# Patient Record
Sex: FEMALE | Race: OTHER | Employment: FULL TIME | URBAN - METROPOLITAN AREA
[De-identification: names, ages, dates, MRNs, and addresses within clinical notes are randomized per-mention and may not be internally consistent; named-entity substitution may affect disease eponyms.]

---

## 2020-03-23 ENCOUNTER — HOSPITAL ENCOUNTER (EMERGENCY)
Facility: HOSPITAL | Age: 30
Discharge: HOME | End: 2020-03-23
Payer: COMMERCIAL

## 2020-03-23 ENCOUNTER — APPOINTMENT (OUTPATIENT)
Dept: CT IMAGING | Facility: HOSPITAL | Age: 30
End: 2020-03-23
Attending: NURSE PRACTITIONER
Payer: COMMERCIAL

## 2020-03-23 VITALS
HEART RATE: 81 BPM | OXYGEN SATURATION: 100 % | DIASTOLIC BLOOD PRESSURE: 77 MMHG | RESPIRATION RATE: 16 BRPM | SYSTOLIC BLOOD PRESSURE: 136 MMHG | TEMPERATURE: 98 F

## 2020-03-23 DIAGNOSIS — N91.2 AMENORRHEA: ICD-10-CM

## 2020-03-23 DIAGNOSIS — K42.9 UMBILICAL HERNIA WITHOUT OBSTRUCTION AND WITHOUT GANGRENE: Primary | ICD-10-CM

## 2020-03-23 LAB
ALBUMIN SERPL-MCNC: 3.8 G/DL (ref 3.4–5)
ALP LIVER SERPL-CCNC: 114 U/L (ref 37–98)
ALT SERPL-CCNC: 58 U/L (ref 13–56)
ANION GAP SERPL CALC-SCNC: 3 MMOL/L (ref 0–18)
AST SERPL-CCNC: 30 U/L (ref 15–37)
B-HCG UR QL: NEGATIVE
BASOPHILS # BLD AUTO: 0.05 X10(3) UL (ref 0–0.2)
BASOPHILS NFR BLD AUTO: 0.8 %
BILIRUB DIRECT SERPL-MCNC: <0.1 MG/DL (ref 0–0.2)
BILIRUB SERPL-MCNC: 0.3 MG/DL (ref 0.1–2)
BILIRUB UR QL: NEGATIVE
BUN BLD-MCNC: 12 MG/DL (ref 7–18)
BUN/CREAT SERPL: 18.8 (ref 10–20)
CALCIUM BLD-MCNC: 8.8 MG/DL (ref 8.5–10.1)
CHLORIDE SERPL-SCNC: 105 MMOL/L (ref 98–112)
CLARITY UR: CLEAR
CO2 SERPL-SCNC: 31 MMOL/L (ref 21–32)
COLOR UR: YELLOW
CREAT BLD-MCNC: 0.64 MG/DL (ref 0.55–1.02)
DEPRECATED RDW RBC AUTO: 38.5 FL (ref 35.1–46.3)
EOSINOPHIL # BLD AUTO: 0.11 X10(3) UL (ref 0–0.7)
EOSINOPHIL NFR BLD AUTO: 1.7 %
ERYTHROCYTE [DISTWIDTH] IN BLOOD BY AUTOMATED COUNT: 12.3 % (ref 11–15)
GLUCOSE BLD-MCNC: 113 MG/DL (ref 70–99)
GLUCOSE UR-MCNC: NEGATIVE MG/DL
HCT VFR BLD AUTO: 42 % (ref 35–48)
HGB BLD-MCNC: 14.8 G/DL (ref 12–16)
IMM GRANULOCYTES # BLD AUTO: 0.08 X10(3) UL (ref 0–1)
IMM GRANULOCYTES NFR BLD: 1.3 %
KETONES UR-MCNC: NEGATIVE MG/DL
LYMPHOCYTES # BLD AUTO: 1.76 X10(3) UL (ref 1–4)
LYMPHOCYTES NFR BLD AUTO: 27.8 %
M PROTEIN MFR SERPL ELPH: 7.9 G/DL (ref 6.4–8.2)
MCH RBC QN AUTO: 30.5 PG (ref 26–34)
MCHC RBC AUTO-ENTMCNC: 35.2 G/DL (ref 31–37)
MCV RBC AUTO: 86.4 FL (ref 80–100)
MONOCYTES # BLD AUTO: 0.47 X10(3) UL (ref 0.1–1)
MONOCYTES NFR BLD AUTO: 7.4 %
NEUTROPHILS # BLD AUTO: 3.86 X10 (3) UL (ref 1.5–7.7)
NEUTROPHILS # BLD AUTO: 3.86 X10(3) UL (ref 1.5–7.7)
NEUTROPHILS NFR BLD AUTO: 61 %
NITRITE UR QL STRIP.AUTO: NEGATIVE
OSMOLALITY SERPL CALC.SUM OF ELEC: 289 MOSM/KG (ref 275–295)
PH UR: 7 [PH] (ref 5–8)
PLATELET # BLD AUTO: 195 10(3)UL (ref 150–450)
POTASSIUM SERPL-SCNC: 3.6 MMOL/L (ref 3.5–5.1)
PROT UR-MCNC: NEGATIVE MG/DL
RBC # BLD AUTO: 4.86 X10(6)UL (ref 3.8–5.3)
RBC #/AREA URNS AUTO: 19 /HPF
SODIUM SERPL-SCNC: 139 MMOL/L (ref 136–145)
SP GR UR STRIP: 1.02 (ref 1–1.03)
UROBILINOGEN UR STRIP-ACNC: <2
WBC # BLD AUTO: 6.3 X10(3) UL (ref 4–11)
WBC #/AREA URNS AUTO: 6 /HPF

## 2020-03-23 PROCEDURE — 36415 COLL VENOUS BLD VENIPUNCTURE: CPT

## 2020-03-23 PROCEDURE — 74177 CT ABD & PELVIS W/CONTRAST: CPT | Performed by: NURSE PRACTITIONER

## 2020-03-23 PROCEDURE — 81025 URINE PREGNANCY TEST: CPT

## 2020-03-23 PROCEDURE — 87086 URINE CULTURE/COLONY COUNT: CPT | Performed by: NURSE PRACTITIONER

## 2020-03-23 PROCEDURE — 85025 COMPLETE CBC W/AUTO DIFF WBC: CPT | Performed by: NURSE PRACTITIONER

## 2020-03-23 PROCEDURE — 81001 URINALYSIS AUTO W/SCOPE: CPT | Performed by: NURSE PRACTITIONER

## 2020-03-23 PROCEDURE — 80048 BASIC METABOLIC PNL TOTAL CA: CPT | Performed by: NURSE PRACTITIONER

## 2020-03-23 PROCEDURE — 99284 EMERGENCY DEPT VISIT MOD MDM: CPT

## 2020-03-23 PROCEDURE — 80076 HEPATIC FUNCTION PANEL: CPT | Performed by: NURSE PRACTITIONER

## 2020-03-23 NOTE — ED INITIAL ASSESSMENT (HPI)
Pt c/o mid lower abdominal pain described as \"a full, bloated feeling\", x2 days. Pt states the pain is worse after eating, last BM was an hour ago, states normal BM. Pt c/o urinary frequency, with \"a pink/white residue on toilet paper when I wipe. \" Pt

## 2020-03-23 NOTE — ED NOTES
Received pt from triage. Pt here with c/o mid and LLQ abd pain for the past 2 days. Abd tender on palpation. Denies any N/V/D.  Iv inserted, labs drawn and sent

## 2020-03-23 NOTE — ED PROVIDER NOTES
Patient Seen in: [facility] AND [facility] Emergency Department      History   Patient presents with:  Abdomen/Flank Pain    Stated Complaint: abdominal pain     HPI    61-year-old female, with no past medical history, presents to the emergency department compla Cardiovascular:      Rate and Rhythm: Normal rate. Heart sounds: No murmur. Pulmonary:      Effort: Pulmonary effort is normal.      Breath sounds: Normal breath sounds. Abdominal:      Palpations: Abdomen is soft. Tenderness:  There is tend other acute process  MDM     Exam benign umbilical pain . labs within normal limits. Patient is hemodynamically stable and was instructed to follow-up with pmd /GI Dr Jose Hogan. Strict return precautions given.     Imaging:         CONCLUSION:   1.   There

## 2020-03-24 ENCOUNTER — TELEPHONE (OUTPATIENT)
Dept: GASTROENTEROLOGY | Facility: CLINIC | Age: 30
End: 2020-03-24

## 2020-03-24 NOTE — TELEPHONE ENCOUNTER
I reviewed ER evaluation from yesterday which indicates complaints of mid abdominal pain x2 days associated with abdominal bloating. No vomiting, diarrhea, acute bleeding.   Lab work demonstrates elevated glucose and mildly elevated alk phos (114), ALT (58

## 2020-03-24 NOTE — TELEPHONE ENCOUNTER
Using [employer] #[id number], patient contacted. We have never seen in clinic. Forwarding to [facility] APN to review ER note/recommendation GI and CT. Accepted appt Mon 4/6, instructed to check in by 1:15pm and given detailed directions to [facility].  Is a

## 2020-03-24 NOTE — H&P
[address] [facility] [address] Gastroenterology                                                                                                  Clinic History and Physical     Pa apnea. Pertinent Family Hx:  - No family hx of esophageal, gastric or colon cancer  - No family history of IBD.     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 >45 minutes was spent with >50% in patient consultation/coordination of care    2. Elevated LFTs: Mild elevation in liver function tests which may be transient. No acute hepatobiliary findings on CT scan.   Plan to repeat hepatic function test

## 2020-03-24 NOTE — TELEPHONE ENCOUNTER
Pt. requesting to be seen for ER F/up for Umbilical hernia without obstruction and without gangrene. Pt states that she is having a lot of pain. - [place] Spkg.

## 2020-03-24 NOTE — TELEPHONE ENCOUNTER
Using [employer], #[id number], he left a voicemail that I added patient to earlier appt on Thursday, April 2, arrival 10:15am at the same location as the previous 4/6 appt from earlier this morning.  Requested call back as soon as possible to co

## 2020-04-02 ENCOUNTER — OFFICE VISIT (OUTPATIENT)
Dept: GASTROENTEROLOGY | Facility: CLINIC | Age: 30
End: 2020-04-02
Payer: COMMERCIAL

## 2020-04-02 VITALS
WEIGHT: 181 LBS | BODY MASS INDEX: 35.53 KG/M2 | HEIGHT: 60 IN | HEART RATE: 76 BPM | DIASTOLIC BLOOD PRESSURE: 66 MMHG | SYSTOLIC BLOOD PRESSURE: 116 MMHG

## 2020-04-02 DIAGNOSIS — R14.0 ABDOMINAL BLOATING: ICD-10-CM

## 2020-04-02 DIAGNOSIS — K59.00 CONSTIPATION, UNSPECIFIED CONSTIPATION TYPE: ICD-10-CM

## 2020-04-02 DIAGNOSIS — R10.33 PERIUMBILICAL PAIN: Primary | ICD-10-CM

## 2020-04-02 DIAGNOSIS — R79.89 ELEVATED LFTS: ICD-10-CM

## 2020-04-02 DIAGNOSIS — R10.13 DYSPEPSIA: ICD-10-CM

## 2020-04-02 PROCEDURE — 99204 OFFICE O/P NEW MOD 45 MIN: CPT | Performed by: NURSE PRACTITIONER

## 2020-04-03 ENCOUNTER — APPOINTMENT (OUTPATIENT)
Dept: LAB | Facility: HOSPITAL | Age: 30
End: 2020-04-03
Attending: NURSE PRACTITIONER
Payer: COMMERCIAL

## 2020-04-03 DIAGNOSIS — R10.13 DYSPEPSIA: ICD-10-CM

## 2020-04-03 DIAGNOSIS — R10.33 PERIUMBILICAL PAIN: ICD-10-CM

## 2020-04-03 DIAGNOSIS — R14.0 ABDOMINAL BLOATING: ICD-10-CM

## 2020-04-03 PROCEDURE — 87338 HPYLORI STOOL AG IA: CPT

## 2020-04-09 ENCOUNTER — TELEPHONE (OUTPATIENT)
Dept: GASTROENTEROLOGY | Facility: CLINIC | Age: 30
End: 2020-04-09

## 2020-04-09 DIAGNOSIS — A04.8 H. PYLORI INFECTION: Primary | ICD-10-CM

## 2020-04-09 RX ORDER — METRONIDAZOLE 500 MG/1
500 TABLET ORAL 3 TIMES DAILY
Qty: 42 TABLET | Refills: 0 | Status: SHIPPED | OUTPATIENT
Start: 2020-04-09 | End: 2020-04-23

## 2020-04-09 RX ORDER — PANTOPRAZOLE SODIUM 40 MG/1
40 TABLET, DELAYED RELEASE ORAL 2 TIMES DAILY
Qty: 28 TABLET | Refills: 0 | Status: SHIPPED | OUTPATIENT
Start: 2020-04-09 | End: 2020-04-23

## 2020-04-09 RX ORDER — CLARITHROMYCIN 500 MG/1
500 TABLET, COATED ORAL 2 TIMES DAILY
Qty: 28 TABLET | Refills: 0 | Status: SHIPPED | OUTPATIENT
Start: 2020-04-09 | End: 2020-04-23

## 2020-04-09 NOTE — TELEPHONE ENCOUNTER
----- Message from PARVIZ Ocampo sent at 4/9/2020 10:38 AM CDT -----  Nursing: Patient is Pashto-speaking. Please let her know that the H. pylori test came back positive for infection in the stomach.   I would recommend treatment which will include

## 2020-04-09 NOTE — TELEPHONE ENCOUNTER
Pt was contacted and discussed APRN message below as well as the following w/ patient regarding h.  Pylori treatment:     LAUREN #[id number] USED TO REVIEW BELOW:      Patient accepted follow up appointment for July 14, 2020 at 8:00am with [facility] [place] [facility] while on therapy. Clarithromycin- hold any STATIN/tamsulosin/escitalopram for duration of treatment as it is contraindicated- NEED AN MD ORDER IN ORDER TO INFORM THE PATIENT TO HOLD! Entered recall into Epic: Per Guevara HALL Repeat h. p

## 2020-04-10 NOTE — TELEPHONE ENCOUNTER
#[id number]- Pt returned call CSS relayed RN message pertaining to PA needed for metronidazole as well as to wait to take the other two medications until she has received the metronidazole.  Please call pt once PA has been approved [phone]

## 2020-04-10 NOTE — TELEPHONE ENCOUNTER
Patient had called because Christel only gave her 2 of the 3 medications ordered for her H Pylori treatment. Patient aware not to start taking any of her treatment until she has all three medications.       I called Christel [facility]) and metronidazole i

## 2020-04-10 NOTE — TELEPHONE ENCOUNTER
PA submitted in cover my meds for Metronidazole: Your information has been submitted to [employer]. [employer] is reviewing the PA request and you will receive an electronic response.  You may check for the updated outcome later by reopening this requ

## 2020-04-28 ENCOUNTER — TELEPHONE (OUTPATIENT)
Dept: GASTROENTEROLOGY | Facility: CLINIC | Age: 30
End: 2020-04-28

## 2020-04-28 NOTE — TELEPHONE ENCOUNTER
Patient calling to check status on prior auth regarding medications - also see TE 4/9/2020. Please call. Thank you.

## 2020-04-28 NOTE — TELEPHONE ENCOUNTER
Forwarding this back to GI RNs, appears Blane Eason working on this. I closed 4/28 encounter--patient calling to check status of Metronidazole authorization. I contacted Zenaida Oreilly at [address] below [phone]--DIPTI was unable to find patient.   I a

## 2020-06-01 RX ORDER — PANTOPRAZOLE SODIUM 40 MG/1
40 TABLET, DELAYED RELEASE ORAL 2 TIMES DAILY
Qty: 28 TABLET | Refills: 0 | Status: SHIPPED | OUTPATIENT
Start: 2020-06-01 | End: 2020-06-15

## 2020-06-01 NOTE — TELEPHONE ENCOUNTER
Nursing: I have resent pantoprazole as requested. Just to confirm, the patient is taking all 3 of these medications together correct?

## 2020-06-01 NOTE — TELEPHONE ENCOUNTER
Kelsey Rosales    Patient told me that prior auth was needed for metronidazole. No prior auth needed per plan (because already done?). I spoke with Christel and patient never picked up pantoprazole. I filled out request for PA in [email or web address].   Per record,

## 2020-06-01 NOTE — TELEPHONE ENCOUNTER
Patient aware to take all 3 medications together, she has been waiting to get all 3 to be able to start treatment. Required PA for metronidazole and now working on PA for the pantoprazole she never received.

## 2020-06-01 NOTE — TELEPHONE ENCOUNTER
Unable to follow up on request through [employer] because it says incorrect name and birthday (even though verified that it matched insurance card). I spoke with Cleopatra Monteiro at [facility] and per Cleopatra Monteiro, this medication does not require preauthorization.

## 2020-06-02 NOTE — TELEPHONE ENCOUNTER
Catherine Erazo (Key: [id number])Need help? Call us at [phone]  Status  Sent to [address] June 1  Next Steps  The plan will fax you a determination, typically within 1 to 5 business days.     Still awaiting PA response

## 2020-06-09 NOTE — TELEPHONE ENCOUNTER
I spoke with Sachin Jiménez with [employer]. She could not find the patient in the system. I then spoke with Maritza Arango who told me that [employer] is in charge of her medication coverage instead of [employer].   Per covermymeds it gets submitted to [employer]

## 2020-06-11 NOTE — TELEPHONE ENCOUNTER
I spoke with Genaro Wagoner at [facility] and prior authorization approved for Pantoprazole 28 tablets. I spoke with Christel, the medication has prior authorization and will be $15 out of pocket.     I tried to call patient to notify her that we received prior aut

## 2020-06-12 NOTE — TELEPHONE ENCOUNTER
Orin Hguhes #[id number]. Patient aware that prior authorization was received for Pantoprazole.   Per Christel, that was the only medication that the patient did not  out of the three medications that were ordered for her H Pylori Treatment (metr

## 2020-07-31 ENCOUNTER — TELEPHONE (OUTPATIENT)
Dept: GASTROENTEROLOGY | Facility: CLINIC | Age: 30
End: 2020-07-31

## 2020-07-31 NOTE — TELEPHONE ENCOUNTER
Patient was waiting for a prior authorization and did not start treatment until June 12, 2020. Will place recall for H pylori stool test for 8 weeks post treatment which will be 8/21/2020.     Thank you

## 2020-07-31 NOTE — TELEPHONE ENCOUNTER
Received patient outreach message below. Patient due for repeat H Pylori,    \"Entered recall into Epic: Per Chaim HALL Repeat h.pylori stool/breath test due approximately:July 9 2020. \"

## 2020-08-12 ENCOUNTER — TELEPHONE (OUTPATIENT)
Dept: GASTROENTEROLOGY | Facility: CLINIC | Age: 30
End: 2020-08-12

## 2020-08-12 NOTE — TELEPHONE ENCOUNTER
Patient outreach message received below. Patient now due for repeat H Pylori testing.       \"Note      Patient was waiting for a prior authorization and did not start treatment until June 12, 2020.     Will place recall for H pylori stool test for 8 weeks

## 2020-08-17 NOTE — TELEPHONE ENCOUNTER
I spoke with Lev Sanchez using [facility]  [facility] [id number]. Reminding her to complete her H pylori stool testing and to stop her acid reducing medication for 2 weeks prior to testing. She has completed her course of pantoprazole and antibiotics.  She has

## 2020-12-24 ENCOUNTER — TELEPHONE (OUTPATIENT)
Dept: GASTROENTEROLOGY | Facility: CLINIC | Age: 30
End: 2020-12-24

## 2021-05-23 ENCOUNTER — HOSPITAL ENCOUNTER (EMERGENCY)
Facility: HOSPITAL | Age: 31
Discharge: HOME | End: 2021-05-23
Attending: EMERGENCY MEDICINE
Payer: COMMERCIAL

## 2021-05-23 ENCOUNTER — APPOINTMENT (OUTPATIENT)
Dept: ULTRASOUND IMAGING | Facility: HOSPITAL | Age: 31
End: 2021-05-23
Attending: EMERGENCY MEDICINE
Payer: COMMERCIAL

## 2021-05-23 VITALS
TEMPERATURE: 99 F | DIASTOLIC BLOOD PRESSURE: 64 MMHG | HEART RATE: 72 BPM | OXYGEN SATURATION: 99 % | BODY MASS INDEX: 28.32 KG/M2 | SYSTOLIC BLOOD PRESSURE: 112 MMHG | HEIGHT: 64.96 IN | RESPIRATION RATE: 18 BRPM | WEIGHT: 170 LBS

## 2021-05-23 DIAGNOSIS — O20.9 VAGINAL BLEEDING AFFECTING EARLY PREGNANCY: Primary | ICD-10-CM

## 2021-05-23 PROCEDURE — 81001 URINALYSIS AUTO W/SCOPE: CPT | Performed by: EMERGENCY MEDICINE

## 2021-05-23 PROCEDURE — 86900 BLOOD TYPING SEROLOGIC ABO: CPT | Performed by: EMERGENCY MEDICINE

## 2021-05-23 PROCEDURE — 99284 EMERGENCY DEPT VISIT MOD MDM: CPT

## 2021-05-23 PROCEDURE — 76801 OB US < 14 WKS SINGLE FETUS: CPT | Performed by: EMERGENCY MEDICINE

## 2021-05-23 PROCEDURE — 81025 URINE PREGNANCY TEST: CPT

## 2021-05-23 PROCEDURE — 86901 BLOOD TYPING SEROLOGIC RH(D): CPT | Performed by: EMERGENCY MEDICINE

## 2021-05-23 PROCEDURE — 36415 COLL VENOUS BLD VENIPUNCTURE: CPT

## 2021-05-23 PROCEDURE — 76817 TRANSVAGINAL US OBSTETRIC: CPT | Performed by: EMERGENCY MEDICINE

## 2021-05-23 PROCEDURE — 84702 CHORIONIC GONADOTROPIN TEST: CPT | Performed by: EMERGENCY MEDICINE

## 2021-05-23 PROCEDURE — 80048 BASIC METABOLIC PNL TOTAL CA: CPT | Performed by: EMERGENCY MEDICINE

## 2021-05-23 PROCEDURE — 85025 COMPLETE CBC W/AUTO DIFF WBC: CPT | Performed by: EMERGENCY MEDICINE

## 2021-05-23 NOTE — ED INITIAL ASSESSMENT (HPI)
Radha Neal is here for evaluation of vaginal bleeding. She had blood work done on Friday at [facility] and was found to be pregnant.

## 2021-05-25 ENCOUNTER — OFFICE VISIT (OUTPATIENT)
Dept: OBGYN CLINIC | Facility: CLINIC | Age: 31
End: 2021-05-25
Payer: COMMERCIAL

## 2021-05-25 VITALS — DIASTOLIC BLOOD PRESSURE: 72 MMHG | SYSTOLIC BLOOD PRESSURE: 102 MMHG

## 2021-05-25 DIAGNOSIS — N92.6 MISSED MENSES: Primary | ICD-10-CM

## 2021-05-25 DIAGNOSIS — O20.0 THREATENED ABORTION, ANTEPARTUM: ICD-10-CM

## 2021-05-25 DIAGNOSIS — O36.80X0 PREGNANCY, LOCATION UNKNOWN: ICD-10-CM

## 2021-05-25 PROCEDURE — 3078F DIAST BP <80 MM HG: CPT | Performed by: OBSTETRICS & GYNECOLOGY

## 2021-05-25 PROCEDURE — 99204 OFFICE O/P NEW MOD 45 MIN: CPT | Performed by: OBSTETRICS & GYNECOLOGY

## 2021-05-25 PROCEDURE — 3074F SYST BP LT 130 MM HG: CPT | Performed by: OBSTETRICS & GYNECOLOGY

## 2021-05-25 PROCEDURE — 81025 URINE PREGNANCY TEST: CPT | Performed by: OBSTETRICS & GYNECOLOGY

## 2021-05-25 NOTE — PROGRESS NOTES
HPI/Subjective:   Patient ID: Claudia Jackson is a 35year old female. HPI  New patient  67-year-old  6 para 5-0-0-5 last menstrual period  at 8-0/7 weeks gestational age.   States that she had been spotting for close to 2 weeks and on ANGIOEDEMA  Penicillins             SHORTNESS OF BREATH    Objective:   Physical Exam  Alert and oriented  In no distress. No masses.   Abdomen soft and nontender obese  Assessment & Plan:   Missed menses  (primary encounter diagnosis)  Threatened

## 2021-05-26 ENCOUNTER — TELEPHONE (OUTPATIENT)
Dept: OBGYN CLINIC | Facility: CLINIC | Age: 31
End: 2021-05-26

## 2021-05-26 ENCOUNTER — LAB ENCOUNTER (OUTPATIENT)
Dept: LAB | Facility: HOSPITAL | Age: 31
End: 2021-05-26
Attending: OBSTETRICS & GYNECOLOGY
Payer: COMMERCIAL

## 2021-05-26 DIAGNOSIS — O20.0 THREATENED ABORTION, ANTEPARTUM: ICD-10-CM

## 2021-05-26 DIAGNOSIS — N92.6 MISSED MENSES: ICD-10-CM

## 2021-05-26 DIAGNOSIS — O36.80X0 PREGNANCY, LOCATION UNKNOWN: ICD-10-CM

## 2021-05-26 PROCEDURE — 36415 COLL VENOUS BLD VENIPUNCTURE: CPT

## 2021-05-26 PROCEDURE — 84702 CHORIONIC GONADOTROPIN TEST: CPT

## 2021-05-26 NOTE — TELEPHONE ENCOUNTER
I informed patient of quant bhcg abnormal, plateau indicating an abnormal pregnancy- miscarriage vs ectopic. Recommend continued q 3 day quant bhcg.   Please move up patient's ultrasound appt with me for Tues 6/1 at 1 pm.

## 2021-05-28 NOTE — TELEPHONE ENCOUNTER
Patient did not understand message that was recently left for her because she is a Syriac speaking patient. Please call again using the .

## 2021-05-28 NOTE — TELEPHONE ENCOUNTER
Patient name and  verified. Patient states she received a call from [address] [place] [address]. Informed patient no one from office contacted her and it may have been the automated system conforming her  appt. No further questions.

## 2021-05-29 ENCOUNTER — LAB ENCOUNTER (OUTPATIENT)
Dept: LAB | Facility: HOSPITAL | Age: 31
End: 2021-05-29
Attending: OBSTETRICS & GYNECOLOGY
Payer: COMMERCIAL

## 2021-05-29 DIAGNOSIS — O20.0 THREATENED ABORTION, ANTEPARTUM: ICD-10-CM

## 2021-05-29 DIAGNOSIS — O36.80X0 PREGNANCY, LOCATION UNKNOWN: ICD-10-CM

## 2021-05-29 DIAGNOSIS — N92.6 MISSED MENSES: ICD-10-CM

## 2021-05-29 PROCEDURE — 36415 COLL VENOUS BLD VENIPUNCTURE: CPT

## 2021-05-29 PROCEDURE — 84702 CHORIONIC GONADOTROPIN TEST: CPT

## 2021-06-01 ENCOUNTER — ANESTHESIA (OUTPATIENT)
Dept: SURGERY | Facility: HOSPITAL | Age: 31
End: 2021-06-01
Payer: COMMERCIAL

## 2021-06-01 ENCOUNTER — HOSPITAL ENCOUNTER (OUTPATIENT)
Facility: HOSPITAL | Age: 31
Setting detail: OBSERVATION
Discharge: HOME | End: 2021-06-02
Attending: EMERGENCY MEDICINE | Admitting: OBSTETRICS & GYNECOLOGY
Payer: COMMERCIAL

## 2021-06-01 ENCOUNTER — OFFICE VISIT (OUTPATIENT)
Dept: OBGYN CLINIC | Facility: CLINIC | Age: 31
End: 2021-06-01
Payer: COMMERCIAL

## 2021-06-01 ENCOUNTER — APPOINTMENT (OUTPATIENT)
Dept: ULTRASOUND IMAGING | Facility: HOSPITAL | Age: 31
End: 2021-06-01
Attending: EMERGENCY MEDICINE
Payer: COMMERCIAL

## 2021-06-01 ENCOUNTER — ANESTHESIA EVENT (OUTPATIENT)
Dept: SURGERY | Facility: HOSPITAL | Age: 31
End: 2021-06-01
Payer: COMMERCIAL

## 2021-06-01 VITALS — SYSTOLIC BLOOD PRESSURE: 111 MMHG | DIASTOLIC BLOOD PRESSURE: 65 MMHG

## 2021-06-01 DIAGNOSIS — O36.80X0 PREGNANCY, LOCATION UNKNOWN: ICD-10-CM

## 2021-06-01 DIAGNOSIS — O20.0 THREATENED ABORTION, ANTEPARTUM: Primary | ICD-10-CM

## 2021-06-01 DIAGNOSIS — O00.201 ECTOPIC PREGNANCY OF RIGHT OVARY: Primary | ICD-10-CM

## 2021-06-01 PROCEDURE — 76817 TRANSVAGINAL US OBSTETRIC: CPT | Performed by: OBSTETRICS & GYNECOLOGY

## 2021-06-01 PROCEDURE — 3078F DIAST BP <80 MM HG: CPT | Performed by: OBSTETRICS & GYNECOLOGY

## 2021-06-01 PROCEDURE — 99213 OFFICE O/P EST LOW 20 MIN: CPT | Performed by: OBSTETRICS & GYNECOLOGY

## 2021-06-01 PROCEDURE — 76801 OB US < 14 WKS SINGLE FETUS: CPT | Performed by: EMERGENCY MEDICINE

## 2021-06-01 PROCEDURE — 0UB04ZX EXCISION OF RIGHT OVARY, PERCUTANEOUS ENDOSCOPIC APPROACH, DIAGNOSTIC: ICD-10-PCS | Performed by: OBSTETRICS & GYNECOLOGY

## 2021-06-01 PROCEDURE — 0UT54ZZ RESECTION OF RIGHT FALLOPIAN TUBE, PERCUTANEOUS ENDOSCOPIC APPROACH: ICD-10-PCS | Performed by: OBSTETRICS & GYNECOLOGY

## 2021-06-01 PROCEDURE — 59151 TREAT ECTOPIC PREGNANCY: CPT | Performed by: OBSTETRICS & GYNECOLOGY

## 2021-06-01 PROCEDURE — 76817 TRANSVAGINAL US OBSTETRIC: CPT | Performed by: EMERGENCY MEDICINE

## 2021-06-01 PROCEDURE — 3074F SYST BP LT 130 MM HG: CPT | Performed by: OBSTETRICS & GYNECOLOGY

## 2021-06-01 PROCEDURE — 10T24ZZ RESECTION OF PRODUCTS OF CONCEPTION, ECTOPIC, PERCUTANEOUS ENDOSCOPIC APPROACH: ICD-10-PCS | Performed by: OBSTETRICS & GYNECOLOGY

## 2021-06-01 RX ORDER — HEPARIN SODIUM 1000 [USP'U]/ML
INJECTION, SOLUTION INTRAVENOUS; SUBCUTANEOUS AS NEEDED
Status: DISCONTINUED | OUTPATIENT
Start: 2021-06-01 | End: 2021-06-01 | Stop reason: HOSPADM

## 2021-06-01 RX ORDER — HYDROCODONE BITARTRATE AND ACETAMINOPHEN 5; 325 MG/1; MG/1
1 TABLET ORAL AS NEEDED
Status: DISCONTINUED | OUTPATIENT
Start: 2021-06-01 | End: 2021-06-01 | Stop reason: HOSPADM

## 2021-06-01 RX ORDER — SODIUM CHLORIDE 0.9 % (FLUSH) 0.9 %
10 SYRINGE (ML) INJECTION AS NEEDED
Status: DISCONTINUED | OUTPATIENT
Start: 2021-06-01 | End: 2021-06-02

## 2021-06-01 RX ORDER — DEXTROSE, SODIUM CHLORIDE, SODIUM LACTATE, POTASSIUM CHLORIDE, AND CALCIUM CHLORIDE 5; .6; .31; .03; .02 G/100ML; G/100ML; G/100ML; G/100ML; G/100ML
INJECTION, SOLUTION INTRAVENOUS CONTINUOUS
Status: DISCONTINUED | OUTPATIENT
Start: 2021-06-01 | End: 2021-06-02

## 2021-06-01 RX ORDER — PROCHLORPERAZINE EDISYLATE 5 MG/ML
5 INJECTION INTRAMUSCULAR; INTRAVENOUS ONCE AS NEEDED
Status: DISCONTINUED | OUTPATIENT
Start: 2021-06-01 | End: 2021-06-01 | Stop reason: HOSPADM

## 2021-06-01 RX ORDER — ACETAMINOPHEN 325 MG/1
650 TABLET ORAL EVERY 4 HOURS PRN
Status: DISCONTINUED | OUTPATIENT
Start: 2021-06-01 | End: 2021-06-02

## 2021-06-01 RX ORDER — HALOPERIDOL 5 MG/ML
0.25 INJECTION INTRAMUSCULAR ONCE AS NEEDED
Status: DISCONTINUED | OUTPATIENT
Start: 2021-06-01 | End: 2021-06-01 | Stop reason: HOSPADM

## 2021-06-01 RX ORDER — HYDROCODONE BITARTRATE AND ACETAMINOPHEN 5; 325 MG/1; MG/1
1 TABLET ORAL EVERY 4 HOURS PRN
Status: DISCONTINUED | OUTPATIENT
Start: 2021-06-01 | End: 2021-06-02

## 2021-06-01 RX ORDER — BUPIVACAINE HYDROCHLORIDE 2.5 MG/ML
INJECTION, SOLUTION EPIDURAL; INFILTRATION; INTRACAUDAL AS NEEDED
Status: DISCONTINUED | OUTPATIENT
Start: 2021-06-01 | End: 2021-06-01 | Stop reason: HOSPADM

## 2021-06-01 RX ORDER — ROCURONIUM BROMIDE 10 MG/ML
INJECTION, SOLUTION INTRAVENOUS AS NEEDED
Status: DISCONTINUED | OUTPATIENT
Start: 2021-06-01 | End: 2021-06-01 | Stop reason: SURG

## 2021-06-01 RX ORDER — MIDAZOLAM HYDROCHLORIDE 1 MG/ML
INJECTION INTRAMUSCULAR; INTRAVENOUS AS NEEDED
Status: DISCONTINUED | OUTPATIENT
Start: 2021-06-01 | End: 2021-06-01 | Stop reason: SURG

## 2021-06-01 RX ORDER — HYDROMORPHONE HYDROCHLORIDE 1 MG/ML
0.8 INJECTION, SOLUTION INTRAMUSCULAR; INTRAVENOUS; SUBCUTANEOUS EVERY 2 HOUR PRN
Status: DISCONTINUED | OUTPATIENT
Start: 2021-06-01 | End: 2021-06-02

## 2021-06-01 RX ORDER — NALOXONE HYDROCHLORIDE 0.4 MG/ML
80 INJECTION, SOLUTION INTRAMUSCULAR; INTRAVENOUS; SUBCUTANEOUS AS NEEDED
Status: DISCONTINUED | OUTPATIENT
Start: 2021-06-01 | End: 2021-06-01 | Stop reason: HOSPADM

## 2021-06-01 RX ORDER — GLYCOPYRROLATE 0.2 MG/ML
INJECTION, SOLUTION INTRAMUSCULAR; INTRAVENOUS AS NEEDED
Status: DISCONTINUED | OUTPATIENT
Start: 2021-06-01 | End: 2021-06-01 | Stop reason: SURG

## 2021-06-01 RX ORDER — ONDANSETRON 2 MG/ML
4 INJECTION INTRAMUSCULAR; INTRAVENOUS EVERY 8 HOURS PRN
Status: DISCONTINUED | OUTPATIENT
Start: 2021-06-01 | End: 2021-06-02

## 2021-06-01 RX ORDER — DIPHENHYDRAMINE HYDROCHLORIDE 50 MG/ML
12.5 INJECTION INTRAMUSCULAR; INTRAVENOUS EVERY 4 HOURS PRN
Status: DISCONTINUED | OUTPATIENT
Start: 2021-06-01 | End: 2021-06-02

## 2021-06-01 RX ORDER — HYDROMORPHONE HYDROCHLORIDE 1 MG/ML
0.2 INJECTION, SOLUTION INTRAMUSCULAR; INTRAVENOUS; SUBCUTANEOUS EVERY 2 HOUR PRN
Status: DISCONTINUED | OUTPATIENT
Start: 2021-06-01 | End: 2021-06-02

## 2021-06-01 RX ORDER — CLINDAMYCIN PHOSPHATE 150 MG/ML
INJECTION, SOLUTION, CONCENTRATE INTRAVENOUS AS NEEDED
Status: DISCONTINUED | OUTPATIENT
Start: 2021-06-01 | End: 2021-06-01 | Stop reason: SURG

## 2021-06-01 RX ORDER — SODIUM CHLORIDE, SODIUM LACTATE, POTASSIUM CHLORIDE, CALCIUM CHLORIDE 600; 310; 30; 20 MG/100ML; MG/100ML; MG/100ML; MG/100ML
INJECTION, SOLUTION INTRAVENOUS CONTINUOUS
Status: DISCONTINUED | OUTPATIENT
Start: 2021-06-01 | End: 2021-06-01 | Stop reason: HOSPADM

## 2021-06-01 RX ORDER — ONDANSETRON 2 MG/ML
INJECTION INTRAMUSCULAR; INTRAVENOUS AS NEEDED
Status: DISCONTINUED | OUTPATIENT
Start: 2021-06-01 | End: 2021-06-01 | Stop reason: SURG

## 2021-06-01 RX ORDER — HYDROMORPHONE HYDROCHLORIDE 1 MG/ML
0.4 INJECTION, SOLUTION INTRAMUSCULAR; INTRAVENOUS; SUBCUTANEOUS EVERY 5 MIN PRN
Status: DISCONTINUED | OUTPATIENT
Start: 2021-06-01 | End: 2021-06-01 | Stop reason: HOSPADM

## 2021-06-01 RX ORDER — ONDANSETRON 2 MG/ML
4 INJECTION INTRAMUSCULAR; INTRAVENOUS ONCE AS NEEDED
Status: DISCONTINUED | OUTPATIENT
Start: 2021-06-01 | End: 2021-06-01 | Stop reason: HOSPADM

## 2021-06-01 RX ORDER — HYDROMORPHONE HYDROCHLORIDE 1 MG/ML
0.2 INJECTION, SOLUTION INTRAMUSCULAR; INTRAVENOUS; SUBCUTANEOUS EVERY 5 MIN PRN
Status: DISCONTINUED | OUTPATIENT
Start: 2021-06-01 | End: 2021-06-01 | Stop reason: HOSPADM

## 2021-06-01 RX ORDER — MORPHINE SULFATE 4 MG/ML
4 INJECTION, SOLUTION INTRAMUSCULAR; INTRAVENOUS EVERY 10 MIN PRN
Status: DISCONTINUED | OUTPATIENT
Start: 2021-06-01 | End: 2021-06-01 | Stop reason: HOSPADM

## 2021-06-01 RX ORDER — ONDANSETRON 4 MG/1
4 TABLET, FILM COATED ORAL EVERY 8 HOURS PRN
Status: DISCONTINUED | OUTPATIENT
Start: 2021-06-01 | End: 2021-06-02

## 2021-06-01 RX ORDER — SODIUM CHLORIDE 9 MG/ML
INJECTION, SOLUTION INTRAVENOUS CONTINUOUS
Status: DISCONTINUED | OUTPATIENT
Start: 2021-06-01 | End: 2021-06-01

## 2021-06-01 RX ORDER — HYDROMORPHONE HYDROCHLORIDE 1 MG/ML
0.6 INJECTION, SOLUTION INTRAMUSCULAR; INTRAVENOUS; SUBCUTANEOUS EVERY 5 MIN PRN
Status: DISCONTINUED | OUTPATIENT
Start: 2021-06-01 | End: 2021-06-01 | Stop reason: HOSPADM

## 2021-06-01 RX ORDER — NEOSTIGMINE METHYLSULFATE 1 MG/ML
INJECTION INTRAVENOUS AS NEEDED
Status: DISCONTINUED | OUTPATIENT
Start: 2021-06-01 | End: 2021-06-01 | Stop reason: SURG

## 2021-06-01 RX ORDER — MORPHINE SULFATE 4 MG/ML
2 INJECTION, SOLUTION INTRAMUSCULAR; INTRAVENOUS EVERY 10 MIN PRN
Status: DISCONTINUED | OUTPATIENT
Start: 2021-06-01 | End: 2021-06-01 | Stop reason: HOSPADM

## 2021-06-01 RX ORDER — SODIUM CHLORIDE 9 MG/ML
INJECTION, SOLUTION INTRAVENOUS CONTINUOUS
Status: DISCONTINUED | OUTPATIENT
Start: 2021-06-01 | End: 2021-06-02

## 2021-06-01 RX ORDER — HYDROCODONE BITARTRATE AND ACETAMINOPHEN 5; 325 MG/1; MG/1
2 TABLET ORAL AS NEEDED
Status: DISCONTINUED | OUTPATIENT
Start: 2021-06-01 | End: 2021-06-01 | Stop reason: HOSPADM

## 2021-06-01 RX ORDER — HYDROMORPHONE HYDROCHLORIDE 1 MG/ML
0.4 INJECTION, SOLUTION INTRAMUSCULAR; INTRAVENOUS; SUBCUTANEOUS EVERY 2 HOUR PRN
Status: DISCONTINUED | OUTPATIENT
Start: 2021-06-01 | End: 2021-06-02

## 2021-06-01 RX ORDER — DEXAMETHASONE SODIUM PHOSPHATE 4 MG/ML
VIAL (ML) INJECTION AS NEEDED
Status: DISCONTINUED | OUTPATIENT
Start: 2021-06-01 | End: 2021-06-01 | Stop reason: SURG

## 2021-06-01 RX ORDER — MORPHINE SULFATE 10 MG/ML
6 INJECTION, SOLUTION INTRAMUSCULAR; INTRAVENOUS EVERY 10 MIN PRN
Status: DISCONTINUED | OUTPATIENT
Start: 2021-06-01 | End: 2021-06-01 | Stop reason: HOSPADM

## 2021-06-01 RX ORDER — HYDROCODONE BITARTRATE AND ACETAMINOPHEN 5; 325 MG/1; MG/1
2 TABLET ORAL EVERY 4 HOURS PRN
Status: DISCONTINUED | OUTPATIENT
Start: 2021-06-01 | End: 2021-06-02

## 2021-06-01 RX ORDER — SODIUM CHLORIDE, SODIUM LACTATE, POTASSIUM CHLORIDE, CALCIUM CHLORIDE 600; 310; 30; 20 MG/100ML; MG/100ML; MG/100ML; MG/100ML
INJECTION, SOLUTION INTRAVENOUS CONTINUOUS PRN
Status: DISCONTINUED | OUTPATIENT
Start: 2021-06-01 | End: 2021-06-01

## 2021-06-01 RX ADMIN — GLYCOPYRROLATE 0.6 MG: 0.2 INJECTION, SOLUTION INTRAMUSCULAR; INTRAVENOUS at 20:30:00

## 2021-06-01 RX ADMIN — CLINDAMYCIN PHOSPHATE 900 MG: 150 INJECTION, SOLUTION, CONCENTRATE INTRAVENOUS at 19:30:00

## 2021-06-01 RX ADMIN — GLYCOPYRROLATE 0.2 MG: 0.2 INJECTION, SOLUTION INTRAMUSCULAR; INTRAVENOUS at 19:18:00

## 2021-06-01 RX ADMIN — SODIUM CHLORIDE: 9 INJECTION, SOLUTION INTRAVENOUS at 19:17:00

## 2021-06-01 RX ADMIN — DEXAMETHASONE SODIUM PHOSPHATE 4 MG: 4 MG/ML VIAL (ML) INJECTION at 19:18:00

## 2021-06-01 RX ADMIN — MIDAZOLAM HYDROCHLORIDE 2 MG: 1 INJECTION INTRAMUSCULAR; INTRAVENOUS at 19:18:00

## 2021-06-01 RX ADMIN — NEOSTIGMINE METHYLSULFATE 4 MG: 1 INJECTION INTRAVENOUS at 20:30:00

## 2021-06-01 RX ADMIN — ROCURONIUM BROMIDE 40 MG: 10 INJECTION, SOLUTION INTRAVENOUS at 19:18:00

## 2021-06-01 RX ADMIN — ONDANSETRON 4 MG: 2 INJECTION INTRAMUSCULAR; INTRAVENOUS at 19:18:00

## 2021-06-01 NOTE — ED INITIAL ASSESSMENT (HPI)
Patient sent by dr Rosalina Ha for evaluation of ectopic pregnancy, patient complains of abd pain with vaginal discharge, states today the discharge was pink

## 2021-06-01 NOTE — PROGRESS NOTES
HPI/Subjective:   Patient ID: Gasper Lowe is a 35year old female. HPI  GYN follow-up  Patient noting some increasing lower abdominal girth. She has noted intermittent pain across her lower abdomen worse with ambulation.   She denies dizziness o

## 2021-06-01 NOTE — ED QUICK NOTES
Patient with pain 6/10 to right lower abd at this time. Remains non-toxic appearing. On continuous monitoring; IV fluids infusing with vss. Awaiting MD Neftaly Cowart for consult to ED.

## 2021-06-01 NOTE — H&P
HPI:   Myles Samson is a 35year old female who presents for a hx of pelvic pain and was being followed with serial bhcg and ultrasounds, pt is s/p ultrasound in the ER now showing right adnexal ectopic pregnancy and mod amount of lluid in pelvis.  P anxiety  HEMATOLOGIC: denies hx of anemia  ENDOCRINE: denies thyroid history  ALL/ASTHMA: denies hx of allergy or asthma    EXAM:   /63   Pulse 68   Temp 98 °F (36.7 °C)   Resp 14   Wt 165 lb (74.8 kg)   LMP 03/30/2021   SpO2 99%   BMI 27.49 kg/m² appropriately doubling would speak against a nonvisualized IUP, and more for an ectopic pregnancy.  Correlate   clinically and with gynecologic evaluation is advised.            Left ovary measures 2.8 x 2.0 x 2.3 cm.  No mass.       Moderate amount of sli internal organs including bowel/bladder/uterus/ovaries/tubes/cervix/vagina/ureters/blood vessels/among others/hemorrhage and blood transfusion/thromboembolic dz/among other risks and the pt voiced her understanding and all questions answered.  2 u prbc plac

## 2021-06-01 NOTE — ED NOTES
Orders for admission, patient is aware of plan and ready to go upstairs. Any questions, please call ED RN Cheryl Omalley  at extension [phone].    Type of COVID test sent:Rapid---pending  COVID Suspicion level: Low    Titratable drug(s) infusing: n/a  Rate: n/a    LOC

## 2021-06-01 NOTE — ANESTHESIA PREPROCEDURE EVALUATION
Anesthesia PreOp Note    HPI:     Ken Goode is a 35year old female who presents for preoperative consultation requested by:  Diann Mills MD    Date of Surgery: 6/1/2021    Procedure(s):  OPERATIVE LAPAROSCOPY, EXCISION ECTOPIC PREGNANCY Determinants of Health  Financial Resource Strain:       Difficulty of Paying Living Expenses:   Food Insecurity:       Worried About [address] in the Last Year:       Ran Out of Food in the Last Year:   Transportation Needs:       Lack of Transp Mallampati: II  TM distance: >3 FB  Neck ROM: full  Dental - normal exam     Pulmonary - negative ROS and normal exam   Cardiovascular - negative ROS and normal exam    Neuro/Psych - negative ROS     GI/Hepatic/Renal - negative ROS     Endo/Other      Co

## 2021-06-01 NOTE — ED PROVIDER NOTES
Patient Seen in: [facility] Emergency Department      History   Patient presents with:  Pregnancy Issues    Stated Complaint: sent by dr Nurys Mcnamara    HPI/Subjective:   HPI    The patient is a 16-year-old  approximately 9-week pregnant female by avery Pupils: Pupils are equal, round, and reactive to light. Neck:      Vascular: No JVD. Cardiovascular:      Rate and Rhythm: Normal rate and regular rhythm. Heart sounds: Normal heart sounds. No murmur heard.      Pulmonary:      Effort: Pulmonary CBC W/ DIFFERENTIAL[[id number]]                              Final result                 Please view results for these tests on the individual orders. TYPE AND SCREEN    Narrative:      The following orders were created for panel order T Darnell Jacobs MD on 6/01/2021 at 3:26 PM     Finalized by (CST): Nadiya Stewart MD on 6/01/2021 at 3:38 PM            Radiology exams  Viewed and reviewed by myself and findings discussed with patient including need for follow up    Admission disposition: 6/1/202

## 2021-06-02 ENCOUNTER — APPOINTMENT (OUTPATIENT)
Dept: GENERAL RADIOLOGY | Facility: HOSPITAL | Age: 31
End: 2021-06-02
Attending: EMERGENCY MEDICINE
Payer: COMMERCIAL

## 2021-06-02 VITALS
SYSTOLIC BLOOD PRESSURE: 105 MMHG | OXYGEN SATURATION: 97 % | DIASTOLIC BLOOD PRESSURE: 57 MMHG | HEART RATE: 65 BPM | RESPIRATION RATE: 18 BRPM | TEMPERATURE: 98 F | BODY MASS INDEX: 27 KG/M2 | WEIGHT: 165 LBS

## 2021-06-02 PROCEDURE — 71045 X-RAY EXAM CHEST 1 VIEW: CPT | Performed by: EMERGENCY MEDICINE

## 2021-06-02 RX ORDER — MORPHINE SULFATE 2 MG/ML
2 INJECTION, SOLUTION INTRAMUSCULAR; INTRAVENOUS ONCE
Status: COMPLETED | OUTPATIENT
Start: 2021-06-02 | End: 2021-06-02

## 2021-06-02 RX ORDER — HYDROCODONE BITARTRATE AND ACETAMINOPHEN 5; 325 MG/1; MG/1
1 TABLET ORAL EVERY 6 HOURS PRN
Qty: 15 TABLET | Refills: 0 | Status: SHIPPED | OUTPATIENT
Start: 2021-06-02 | End: 2021-06-29

## 2021-06-02 RX ORDER — MORPHINE SULFATE 2 MG/ML
INJECTION, SOLUTION INTRAMUSCULAR; INTRAVENOUS
Status: COMPLETED
Start: 2021-06-02 | End: 2021-06-02

## 2021-06-02 RX ORDER — ONDANSETRON 2 MG/ML
4 INJECTION INTRAMUSCULAR; INTRAVENOUS ONCE
Status: COMPLETED | OUTPATIENT
Start: 2021-06-02 | End: 2021-06-02

## 2021-06-02 RX ORDER — FAMOTIDINE 10 MG/ML
INJECTION, SOLUTION INTRAVENOUS
Status: COMPLETED
Start: 2021-06-02 | End: 2021-06-02

## 2021-06-02 RX ORDER — FAMOTIDINE 10 MG/ML
20 INJECTION, SOLUTION INTRAVENOUS ONCE
Status: COMPLETED | OUTPATIENT
Start: 2021-06-02 | End: 2021-06-02

## 2021-06-02 NOTE — BRIEF OP NOTE
Pre-Operative Diagnosis: Ectopic pregnancy of right ovary [O00.201]     Post-Operative Diagnosis: Ectopic pregnancy of right ovary [O00.201]      Procedure Performed:   OPERATIVE LAPAROSCOPY, right salpingectomy,right ovarian biopsy    Surgeon(s) and Role:

## 2021-06-02 NOTE — SIGNIFICANT EVENT
RRT    *See RRT Documentation Record*    Reason the RRT was called: Patient complaining of chest pain \"heaviness\" and shortness of breath   Assessment of patient leading up to RRT: Complaining of chest pain and difficulty breathing.  O2 98% on room air, B

## 2021-06-02 NOTE — ANESTHESIA PROCEDURE NOTES
Airway  Date/Time: 6/1/2021 7:19 PM  Urgency: emergent    Airway not difficult    General Information and Staff    Patient location during procedure: OR  Anesthesiologist: Carine Parks MD  Performed: anesthesiologist     Consent for Airway (if perf

## 2021-06-02 NOTE — PLAN OF CARE
Patient admitted from PACU s/p surgery. Lap sites are C/D/I. Pain managed with PRN dilaudid, will transition to oral pain meds when eating more. Clear liquid diet, no nausea/vomiting but has decreased appetite. IVF running.  Up with standby assist. Voided s appropriate and evaluate response  - Consider cultural and social influences on pain and pain management  - Manage/alleviate anxiety  - Utilize distraction and/or relaxation techniques  - Monitor for opioid side effects  - Notify MD/LIP if interventions un Initiate Pressure Ulcer prevention bundle as indicated  Outcome: Progressing

## 2021-06-02 NOTE — PROGRESS NOTES
POD 1  Pt stated she feels mild  incisional pain, mostly on the right incision. Pt has some appetite , will try breakfast.  Pt counseled on intraoperative findings. Pt asking about time off work and will send her work papers to our office.      Alert and o

## 2021-06-02 NOTE — PROGRESS NOTES
Spoke with Dr. Trudi Hansen- aware of findings from chest xray, troponin, slighly elevated ddimer-- patient feels well currently, no chest pain or shortness of breath-- ok to discharge- to follow up in office tomorrow at 09 am.

## 2021-06-02 NOTE — SIGNIFICANT EVENT
Rapid response called for pleuritic 7/10 r ant sup chest pain. No abd pain. Mild sob. No hypoxia noted though was placed on oxygen. No cough. No nausea. No calf pain.     /61 (BP Location: Right arm)   Pulse 84   Temp 98.4 °F (36.9 °C) (Oral)   R

## 2021-06-02 NOTE — DISCHARGE SUMMARY
Date of admission 6/1/21  Date of discharge 6/2/21    Dx ectopic pregnancy    34 yo with ectopic pregnancy s/p operative laparoscopy. Pt with transient episode of chest discomfort and seen by dr Jorge Navarrete hospitalist, workup was negative.   Possible anxiety per

## 2021-06-02 NOTE — ANESTHESIA POSTPROCEDURE EVALUATION
Patient: Rhina Sinha    Procedure Summary     Date: 06/01/21 Room / Location: [facility] OR 09 / [facility] OR    Anesthesia Start: 1917 Anesthesia Stop: 2046    Procedure: OPERATIVE LAPAROSCOPY, right salpingectomy,right ovarian biopsy (N/A ) Diagnosi

## 2021-06-02 NOTE — PLAN OF CARE
Patient stable, vital signs stable. Patient status post salingectomy with Dr. Doretha Turcios due to ectopic pregnancy. Patient pain controlled with norco as needed. Patient tolerating diet well, no nausea, no vomiting. Patient voiding freely.  Patient with three Goal  Description: Patient's Short Term Goal: to have no pain in my stomach    Interventions:   -Pain medications as needed   -Nonpharmacologic interventions   - See additional Care Plan goals for specific interventions  6/2/2021 1602 by STARR Diggs Problem: GASTROINTESTINAL - ADULT  Goal: Minimal or absence of nausea and vomiting  Description: INTERVENTIONS:  - Maintain adequate hydration with IV or PO as ordered and tolerated  - Nasogastric tube to low intermittent suction as ordered  - Evaluate e

## 2021-06-02 NOTE — ANESTHESIA POSTPROCEDURE EVALUATION
Patient: Albaro Dill    Procedure Summary     Date: 06/01/21 Room / Location: [facility] OR 09 / [facility] OR    Anesthesia Start: 1917 Anesthesia Stop: 2046    Procedure: OPERATIVE LAPAROSCOPY, right salpingectomy,right ovarian biopsy (N/A ) Diagnosi

## 2021-06-02 NOTE — OPERATIVE REPORT
[facility]    PATIENT'S NAME: Veronica Diaz [place]   ATTENDING PHYSICIAN: Timothy Valverde MD   OPERATING PHYSICIAN: Enrique Valverde MD   PATIENT ACCOUNT#:   [id number]    LOCATION:  [address] [place] [address] #:   [id number]       DA from the fimbriated end.   2.   Hemorrhagic lesion noted in the right ovary with biopsy performed     OPERATIVE TECHNIQUE:  The patient was taken to the operating room.   After induction of general endotracheal anesthesia, the patient was prepped and draped Pathology. At this time, copious irrigation was performed. Note that approximately 150-200 mL of blood were noted in the pelvis at the beginning of the procedure, consistent with bleeding from the right ectopic pregnancy.     At this time, excellent hemos

## 2021-06-02 NOTE — PROGRESS NOTES
Pt experiencing some chest pain. Pt is Mongolian-speaking only.  ordered chest x-ray.   No opportunity to interact with pt.     06/02/21 6351   Clinical Encounter Type   Visited With Patient not available

## 2021-06-03 ENCOUNTER — OFFICE VISIT (OUTPATIENT)
Dept: OBGYN CLINIC | Facility: CLINIC | Age: 31
End: 2021-06-03
Payer: COMMERCIAL

## 2021-06-03 ENCOUNTER — TELEPHONE (OUTPATIENT)
Dept: OBGYN CLINIC | Facility: CLINIC | Age: 31
End: 2021-06-03

## 2021-06-03 VITALS
BODY MASS INDEX: 32.39 KG/M2 | DIASTOLIC BLOOD PRESSURE: 78 MMHG | WEIGHT: 165 LBS | HEIGHT: 60 IN | SYSTOLIC BLOOD PRESSURE: 108 MMHG

## 2021-06-03 DIAGNOSIS — N30.00 ACUTE CYSTITIS WITHOUT HEMATURIA: ICD-10-CM

## 2021-06-03 DIAGNOSIS — R30.0 DYSURIA: Primary | ICD-10-CM

## 2021-06-03 DIAGNOSIS — O00.91 ECTOPIC PREGNANCY WITH INTRAUTERINE PREGNANCY, UNSPECIFIED LOCATION: Primary | ICD-10-CM

## 2021-06-03 DIAGNOSIS — K59.00 CONSTIPATION, UNSPECIFIED CONSTIPATION TYPE: ICD-10-CM

## 2021-06-03 PROCEDURE — 3008F BODY MASS INDEX DOCD: CPT | Performed by: OBSTETRICS & GYNECOLOGY

## 2021-06-03 PROCEDURE — 99214 OFFICE O/P EST MOD 30 MIN: CPT | Performed by: OBSTETRICS & GYNECOLOGY

## 2021-06-03 PROCEDURE — 3074F SYST BP LT 130 MM HG: CPT | Performed by: OBSTETRICS & GYNECOLOGY

## 2021-06-03 PROCEDURE — 3078F DIAST BP <80 MM HG: CPT | Performed by: OBSTETRICS & GYNECOLOGY

## 2021-06-03 RX ORDER — SULFAMETHOXAZOLE AND TRIMETHOPRIM 800; 160 MG/1; MG/1
1 TABLET ORAL 2 TIMES DAILY
Qty: 14 TABLET | Refills: 0 | Status: SHIPPED | OUTPATIENT
Start: 2021-06-03 | End: 2021-06-10

## 2021-06-03 NOTE — PROGRESS NOTES
HPI:   Johny Craft is a 35year old female who presents for a:    1) dysuria:  Pt noted burning feeling when she urinates, c/w possible uti. Pt counseled, all questions answered.   Pt stated she gets a rash from pcn,  Bactrim ds rx sent, pt to hydr medical history on file. No past surgical history on file. No family history on file.    Social History:   Social History    Tobacco Use      Smoking status: Never Smoker      Smokeless tobacco: Never Used    Vaping Use      Vaping Use: Never used    Al answered. Pt stated she gets a rash from pcn,  Bactrim ds rx sent, pt to hydrate well and to take her antibiotics 1 tablet every 12 hours for 7 days. The patient will schedule follow-up in the office in 1 week.   Patient counseled and does not have any sy

## 2021-06-03 NOTE — TELEPHONE ENCOUNTER
I spoke with pathology,  Pathology slides being sent to [facility] for second opinion,  Cells slightly dilated which is seen in molar preg, but may also be a normal variant. I am ordering serial bhcg to be done weekly until bhcg is negative. Please inform pt.

## 2021-06-03 NOTE — TELEPHONE ENCOUNTER
[place] phone line  #[phone] used to translate phone call.-Informed pt to go this Monday to the lab and have her Hcg level done at the lab. Advised pt Hcg levels need to be done weekly until the result is 0.  Informed pt this office will call her

## 2021-06-04 ENCOUNTER — TELEPHONE (OUTPATIENT)
Dept: OBGYN CLINIC | Facility: CLINIC | Age: 31
End: 2021-06-04

## 2021-06-04 NOTE — TELEPHONE ENCOUNTER
Patient with a history of ectopic pregnancy and R salpingectomy on 6/1/2021 by ASJ. Patient complaining of bleeding that began last night at 11pm. Currently 5/10 abdominal pain and changing pad every 3 hours. Per patient bleeding is increased when urinating. Bleeding precautions reviewed. Advised patient message would be sent to ASJ for recommendations.

## 2021-06-04 NOTE — TELEPHONE ENCOUNTER
Contacted patient as follow up in regards to telephone call from this afternoon. Patient still complaining of incisional pain and states she is prescribed medications as directed. ASJ informed and recommends patient to continue taking antibiotics, push fluids and drink cranberry juice. Patient to monitor for increased bleeding or worsening pain. Advised if symptoms worsen patient to be seen in ED for evaluation. Verbalized understanding.

## 2021-06-04 NOTE — TELEPHONE ENCOUNTER
Patient calling stating bleeding a lot soaking up a lot of pads and going to bathroom and having bowel movements and there is a lot of blood.   States had surgery past Tuesday due to ectopic pregnancy    Bulgarian speaking

## 2021-06-07 ENCOUNTER — LAB ENCOUNTER (OUTPATIENT)
Dept: LAB | Facility: HOSPITAL | Age: 31
End: 2021-06-07
Attending: OBSTETRICS & GYNECOLOGY
Payer: COMMERCIAL

## 2021-06-07 DIAGNOSIS — O00.91 ECTOPIC PREGNANCY WITH INTRAUTERINE PREGNANCY, UNSPECIFIED LOCATION: ICD-10-CM

## 2021-06-07 PROCEDURE — 84702 CHORIONIC GONADOTROPIN TEST: CPT

## 2021-06-07 PROCEDURE — 36415 COLL VENOUS BLD VENIPUNCTURE: CPT

## 2021-06-08 ENCOUNTER — TELEPHONE (OUTPATIENT)
Dept: OBGYN CLINIC | Facility: CLINIC | Age: 31
End: 2021-06-08

## 2021-06-08 NOTE — TELEPHONE ENCOUNTER
----- Message from Dre Elias MD sent at 6/8/2021 10:15 AM CDT -----  Please inform, her bhcg after ectopic pregnancy surgery is dropping very well,  advise repeat bhcg in 1 week.

## 2021-06-10 ENCOUNTER — OFFICE VISIT (OUTPATIENT)
Dept: OBGYN CLINIC | Facility: CLINIC | Age: 31
End: 2021-06-10
Payer: COMMERCIAL

## 2021-06-10 VITALS
DIASTOLIC BLOOD PRESSURE: 66 MMHG | SYSTOLIC BLOOD PRESSURE: 102 MMHG | WEIGHT: 181 LBS | BODY MASS INDEX: 35 KG/M2 | HEART RATE: 71 BPM

## 2021-06-10 DIAGNOSIS — O00.91 ECTOPIC PREGNANCY WITH INTRAUTERINE PREGNANCY, UNSPECIFIED LOCATION: Primary | ICD-10-CM

## 2021-06-10 PROCEDURE — 3078F DIAST BP <80 MM HG: CPT | Performed by: OBSTETRICS & GYNECOLOGY

## 2021-06-10 PROCEDURE — 3074F SYST BP LT 130 MM HG: CPT | Performed by: OBSTETRICS & GYNECOLOGY

## 2021-06-10 PROCEDURE — 99024 POSTOP FOLLOW-UP VISIT: CPT | Performed by: OBSTETRICS & GYNECOLOGY

## 2021-06-12 ENCOUNTER — TELEPHONE (OUTPATIENT)
Dept: OBGYN CLINIC | Facility: CLINIC | Age: 31
End: 2021-06-12

## 2021-06-12 NOTE — TELEPHONE ENCOUNTER
Received fax for [place] paperwork from [facility] [employer] for patient. No SHU received  No Payment collected. Please follow up, thank you.

## 2021-06-13 NOTE — PROGRESS NOTES
.  HPI:   Yola Norwood is a 35year old female who presents for a followup ectopic pregnancy. Pt advised to check serial weekly hcg until negative.   In addition pathology has requested to send the slides of her specimen to outside lab for verificati rashes,no suspicious lesions  HEENT: atraumatic, normocephalic  EYES:normal in appearance  NECK: supple,no adenopathy  CHEST: no chest tenderness  LUNGS: clear to auscultation  CARDIO: RRR without murmur  GI: good BS's,no masses, HSM or tenderness, incisio

## 2021-06-14 ENCOUNTER — LAB ENCOUNTER (OUTPATIENT)
Dept: LAB | Facility: HOSPITAL | Age: 31
End: 2021-06-14
Attending: OBSTETRICS & GYNECOLOGY
Payer: COMMERCIAL

## 2021-06-14 ENCOUNTER — MED REC SCAN ONLY (OUTPATIENT)
Dept: ADMINISTRATIVE | Age: 31
End: 2021-06-14

## 2021-06-14 DIAGNOSIS — O00.91 ECTOPIC PREGNANCY WITH INTRAUTERINE PREGNANCY, UNSPECIFIED LOCATION: ICD-10-CM

## 2021-06-14 PROCEDURE — 36415 COLL VENOUS BLD VENIPUNCTURE: CPT

## 2021-06-14 PROCEDURE — 84702 CHORIONIC GONADOTROPIN TEST: CPT

## 2021-06-14 NOTE — TELEPHONE ENCOUNTER
Fmla/rtw received in forms dept. Logged for processing. No mychart or email to send missing hipaa to. Will need to call pt.

## 2021-06-18 NOTE — TELEPHONE ENCOUNTER
Dr. Nuria Escamilla,     Please sign off on form: FMLA  6/1/21 -  7/1/21  -Highlight the patient and hit \"Chart\" button. -In Chart Review, w/in the Encounter tab - click 1 time on the Telephone call encounter for 6/12/21 Scroll down the telephone encounter.   -

## 2021-06-19 ENCOUNTER — TELEPHONE (OUTPATIENT)
Dept: OBGYN CLINIC | Facility: CLINIC | Age: 31
End: 2021-06-19

## 2021-06-21 ENCOUNTER — TELEPHONE (OUTPATIENT)
Dept: OBGYN CLINIC | Facility: CLINIC | Age: 31
End: 2021-06-21

## 2021-06-21 NOTE — TELEPHONE ENCOUNTER
----- Message from Silas Malin MD sent at 6/20/2021  6:01 PM CDT -----  Bhcg negative. Please inform pt. No further bhcg indicated. F/u for annual /pap exam. Please help pt schedule. Location Indication Override (Is Already Calculated Based On Selected Body Location): Area M

## 2021-06-22 ENCOUNTER — TELEPHONE (OUTPATIENT)
Dept: OBGYN UNIT | Facility: HOSPITAL | Age: 31
End: 2021-06-22

## 2021-06-22 NOTE — TELEPHONE ENCOUNTER
Pt Name and  verified. Patient informed and verbalized understanding. Pt wanted to know if it was possible for her to start lifting 60lbs or more at work or should she refrain from this.  If she should not be lifting this amount or more she would req

## 2021-06-28 ENCOUNTER — TELEPHONE (OUTPATIENT)
Dept: OBGYN CLINIC | Facility: CLINIC | Age: 31
End: 2021-06-28

## 2021-06-28 NOTE — TELEPHONE ENCOUNTER
Patient name and  verified. Patient with a history of ectopic pregnancy and R salpingectomy on 2021 with ASJ. Patient states she noticed \" strings\" from R side of incision site and per patient stomach is swollen with 6/10 pain.  Patient attempt

## 2021-06-28 NOTE — TELEPHONE ENCOUNTER
Agree with advice, if pt has persistent pain, then go to ER now. Otherwise pt may be scheduled for office tomorrow afternoon to see if I can trim the suture string.

## 2021-06-28 NOTE — TELEPHONE ENCOUNTER
Pt had  on   There are strings hanging out of the incision that she tried to pull. She is in a lot of pain and wants to know if it's because of the strings or just the . Please advise.

## 2021-06-29 ENCOUNTER — OFFICE VISIT (OUTPATIENT)
Dept: OBGYN CLINIC | Facility: CLINIC | Age: 31
End: 2021-06-29
Payer: COMMERCIAL

## 2021-06-29 DIAGNOSIS — N30.90 CYSTITIS: ICD-10-CM

## 2021-06-29 DIAGNOSIS — R10.2 SUPRAPUBIC PAIN: ICD-10-CM

## 2021-06-29 DIAGNOSIS — R10.2 PELVIC PAIN: Primary | ICD-10-CM

## 2021-06-29 PROCEDURE — 99214 OFFICE O/P EST MOD 30 MIN: CPT | Performed by: OBSTETRICS & GYNECOLOGY

## 2021-06-30 RX ORDER — SULFAMETHOXAZOLE AND TRIMETHOPRIM 800; 160 MG/1; MG/1
TABLET ORAL
Qty: 14 TABLET | Refills: 0 | Status: SHIPPED | OUTPATIENT
Start: 2021-06-30 | End: 2021-07-27

## 2021-06-30 NOTE — PROGRESS NOTES
HPI:   Violetta Hanson is a 35year old female who presents for     1)  Pelvic pain/suprapubic pain:  Pt noted new onset of pelvic pain/suprapubic pain. Pt stated has had pain for about 1 week.   Pt counseled on possible etiologies of pelvic pain/ eval Days)   There is no height or weight on file to calculate BMI.    GENERAL: well developed, well nourished,in no apparent distress  SKIN: no rashes,no suspicious lesions  HEENT: atraumatic, normocephalic  EYES:normal in appearance  NECK: supple,no adenopathy interference; however, high dose daily dietary supplements may contain biotin concentrations greater than 150 times (5-10 mg) the RDA. Douglas Beal is recommended that physicians ask all patients who may be on biotin supplementation to stop biotin consumption at [employer] [facility]

## 2021-07-09 ENCOUNTER — TELEPHONE (OUTPATIENT)
Dept: OBGYN CLINIC | Facility: CLINIC | Age: 31
End: 2021-07-09

## 2021-07-09 NOTE — TELEPHONE ENCOUNTER
Received [facility] paperwork via fax for patient. No SHU received  No payment collected. Please follow up, thank you.

## 2021-07-19 NOTE — TELEPHONE ENCOUNTER
Dr. Doris Hastings,    *2 forms requiring signature please*     Please sign off on form: FMLA/Return to Work  -Highlight the patient and hit \"Chart\" button.   -In Chart Review, w/in the Encounter tab - click 1 time on the Telephone call encounter for 6/12/2021

## 2021-07-27 ENCOUNTER — OFFICE VISIT (OUTPATIENT)
Dept: OBGYN CLINIC | Facility: CLINIC | Age: 31
End: 2021-07-27
Payer: COMMERCIAL

## 2021-07-27 VITALS — DIASTOLIC BLOOD PRESSURE: 72 MMHG | BODY MASS INDEX: 32 KG/M2 | SYSTOLIC BLOOD PRESSURE: 108 MMHG | WEIGHT: 165 LBS

## 2021-07-27 DIAGNOSIS — N91.2 AMENORRHEA: Primary | ICD-10-CM

## 2021-07-27 DIAGNOSIS — R11.0 NAUSEA: ICD-10-CM

## 2021-07-27 DIAGNOSIS — N92.6 MISSED MENSES: ICD-10-CM

## 2021-07-27 LAB
CONTROL LINE PRESENT WITH A CLEAR BACKGROUND (YES/NO): YES YES/NO
KIT LOT #: NORMAL NUMERIC
PREGNANCY TEST, URINE: NEGATIVE

## 2021-07-27 PROCEDURE — 99213 OFFICE O/P EST LOW 20 MIN: CPT | Performed by: OBSTETRICS & GYNECOLOGY

## 2021-07-27 PROCEDURE — 3074F SYST BP LT 130 MM HG: CPT | Performed by: OBSTETRICS & GYNECOLOGY

## 2021-07-27 PROCEDURE — 81025 URINE PREGNANCY TEST: CPT | Performed by: OBSTETRICS & GYNECOLOGY

## 2021-07-27 PROCEDURE — 3078F DIAST BP <80 MM HG: CPT | Performed by: OBSTETRICS & GYNECOLOGY

## 2021-07-27 NOTE — PROGRESS NOTES
HPI:   uJlee Forman is a 28year old female who presents for a hx of no menses since jun 5 2021.  Pt stated she has started to feels some nausea as well and has not been using contraception and is sexually active.  ucg advised now and pt is providing tenderness  LUNGS: clear to auscultation  CARDIO: RRR without murmur  GI: good BS's,no masses, HSM or tenderness  :introitus is normal,scant discharge,cervix is pink,no adnexal masses or tenderness     MUSCULOSKELETAL: back is not tender,FROM of the back

## 2021-07-28 NOTE — TELEPHONE ENCOUNTER
Dr. Terry Riley,    *2nd request*  *2 forms requiring signature please*     Please sign off on form: FMLA/RTW  -Highlight the patient and hit \"Chart\" button.   -In Chart Review, w/in the Encounter tab - click 1 time on the Telephone call encounter for 6/12/2

## 2021-08-09 NOTE — TELEPHONE ENCOUNTER
Completed and handsigned FMLA/Return to work forms faxed to [email or web address] at , confirmation received. Spoke with pt and informed of above information, pt verbalized understanding.

## 2022-01-21 ENCOUNTER — LAB ENCOUNTER (OUTPATIENT)
Dept: LAB | Facility: HOSPITAL | Age: 32
End: 2022-01-21
Attending: OBSTETRICS & GYNECOLOGY
Payer: COMMERCIAL

## 2022-01-21 ENCOUNTER — TELEPHONE (OUTPATIENT)
Dept: OBGYN CLINIC | Facility: CLINIC | Age: 32
End: 2022-01-21

## 2022-01-21 ENCOUNTER — OFFICE VISIT (OUTPATIENT)
Dept: OBGYN CLINIC | Facility: CLINIC | Age: 32
End: 2022-01-21
Payer: COMMERCIAL

## 2022-01-21 VITALS — DIASTOLIC BLOOD PRESSURE: 70 MMHG | WEIGHT: 173.63 LBS | SYSTOLIC BLOOD PRESSURE: 120 MMHG | BODY MASS INDEX: 34 KG/M2

## 2022-01-21 DIAGNOSIS — Z87.59 HISTORY OF ECTOPIC PREGNANCY: ICD-10-CM

## 2022-01-21 DIAGNOSIS — N92.6 MISSED MENSES: ICD-10-CM

## 2022-01-21 DIAGNOSIS — N92.6 MISSED MENSES: Primary | ICD-10-CM

## 2022-01-21 LAB
B-HCG SERPL-ACNC: 102 MIU/ML
CONTROL LINE PRESENT WITH A CLEAR BACKGROUND (YES/NO): YES YES/NO
KIT LOT #: NORMAL NUMERIC
PREGNANCY TEST, URINE: POSITIVE

## 2022-01-21 PROCEDURE — 84702 CHORIONIC GONADOTROPIN TEST: CPT

## 2022-01-21 PROCEDURE — 99212 OFFICE O/P EST SF 10 MIN: CPT | Performed by: OBSTETRICS & GYNECOLOGY

## 2022-01-21 PROCEDURE — 3078F DIAST BP <80 MM HG: CPT | Performed by: OBSTETRICS & GYNECOLOGY

## 2022-01-21 PROCEDURE — 36415 COLL VENOUS BLD VENIPUNCTURE: CPT

## 2022-01-21 PROCEDURE — 3074F SYST BP LT 130 MM HG: CPT | Performed by: OBSTETRICS & GYNECOLOGY

## 2022-01-21 PROCEDURE — 81025 URINE PREGNANCY TEST: CPT | Performed by: OBSTETRICS & GYNECOLOGY

## 2022-01-21 RX ORDER — VITAMIN A ACETATE, BETA CAROTENE, ASCORBIC ACID, CHOLECALCIFEROL, .ALPHA.-TOCOPHEROL ACETATE, DL-, THIAMINE MONONITRATE, RIBOFLAVIN, NIACINAMIDE, PYRIDOXINE HYDROCHLORIDE, FOLIC ACID, CYANOCOBALAMIN, CALCIUM CARBONATE, FERROUS FUMARATE, ZINC OXIDE, CUPRIC OXIDE 3080; 12; 120; 400; 1; 1.84; 3; 20; 22; 920; 25; 200; 27; 10; 2 [IU]/1; UG/1; MG/1; [IU]/1; MG/1; MG/1; MG/1; MG/1; MG/1; [IU]/1; MG/1; MG/1; MG/1; MG/1; MG/1
1 TABLET, FILM COATED ORAL DAILY
Qty: 90 TABLET | Refills: 3 | Status: SHIPPED | OUTPATIENT
Start: 2022-01-21 | End: 2022-02-20

## 2022-01-21 NOTE — TELEPHONE ENCOUNTER
Please inform patient that her blood pregnancy test is positive but at a lower level. She will need to have these followed closely every 3 days. Please order serial quantitative beta hCGs.   Her early OB ultrasound for next week timing may need to be garrido

## 2022-01-21 NOTE — PROGRESS NOTES
HPI:    Patient ID: Christina Pires is a 28year old year old female. HPI  OB problem visit  Missed menses  52-year-old  6 para 4-0-1-4 last menstrual period  at 4-4/7 weeks gestational age with an [address] of 2022.   Has a history quantitative beta-hCG today. Mild left lower abdominal pinching pain  Early OB ultrasound in 1 week. Notify for severe pain or vaginal bleeding.       Orders Placed This Encounter      POC Urine pregnancy test [[id number]]          Order Specific Question: Rel

## 2022-01-21 NOTE — TELEPHONE ENCOUNTER
Pt Name and  verified. Patient informed and verbalized understanding. Orders placed and pt aware to complete every 3 days. No other questions.

## 2022-01-24 ENCOUNTER — LAB ENCOUNTER (OUTPATIENT)
Dept: LAB | Facility: HOSPITAL | Age: 32
End: 2022-01-24
Attending: OBSTETRICS & GYNECOLOGY
Payer: COMMERCIAL

## 2022-01-24 DIAGNOSIS — N92.6 MISSED MENSES: ICD-10-CM

## 2022-01-24 LAB — B-HCG SERPL-ACNC: 329 MIU/ML

## 2022-01-24 PROCEDURE — 36415 COLL VENOUS BLD VENIPUNCTURE: CPT

## 2022-01-24 PROCEDURE — 84702 CHORIONIC GONADOTROPIN TEST: CPT

## 2022-01-25 ENCOUNTER — TELEPHONE (OUTPATIENT)
Dept: OBGYN CLINIC | Facility: CLINIC | Age: 32
End: 2022-01-25

## 2022-01-25 NOTE — TELEPHONE ENCOUNTER
----- Message from Jason Watkins MD sent at 1/25/2022 10:58 AM CST -----  Please inform of normal rising bhcg levels.   Continue with present recommendations repeating in 3 days

## 2022-01-27 ENCOUNTER — LAB ENCOUNTER (OUTPATIENT)
Dept: LAB | Facility: HOSPITAL | Age: 32
End: 2022-01-27
Attending: OBSTETRICS & GYNECOLOGY
Payer: COMMERCIAL

## 2022-01-27 DIAGNOSIS — N92.6 MISSED MENSES: ICD-10-CM

## 2022-01-27 LAB — B-HCG SERPL-ACNC: 949 MIU/ML

## 2022-01-27 PROCEDURE — 84702 CHORIONIC GONADOTROPIN TEST: CPT

## 2022-01-27 PROCEDURE — 36415 COLL VENOUS BLD VENIPUNCTURE: CPT

## 2022-01-28 ENCOUNTER — TELEPHONE (OUTPATIENT)
Dept: OBGYN CLINIC | Facility: CLINIC | Age: 32
End: 2022-01-28

## 2022-01-28 NOTE — TELEPHONE ENCOUNTER
pt states that she was late for todays appt.  for PN / Zaida Mcqueen, but pt wants to know if is able to come back today at 2:00pm? Pt states that nurse offered today at 2pm., but pt resched to 2/10/2022, but now she changed her mine and prefers to come back this a

## 2022-01-28 NOTE — TELEPHONE ENCOUNTER
OB US in office already scheduled prior to 2pm unable to schedule today at 2pm as there needs to be at least an hour in between [address] that are scheduled. Pt rescheduled to the next earliest available time/date 2/7 at 11:40 in [facility]. No further questions.

## 2022-01-31 ENCOUNTER — APPOINTMENT (OUTPATIENT)
Dept: ULTRASOUND IMAGING | Facility: HOSPITAL | Age: 32
End: 2022-01-31
Attending: EMERGENCY MEDICINE
Payer: COMMERCIAL

## 2022-01-31 ENCOUNTER — HOSPITAL ENCOUNTER (EMERGENCY)
Facility: HOSPITAL | Age: 32
Discharge: HOME | End: 2022-01-31
Attending: EMERGENCY MEDICINE
Payer: COMMERCIAL

## 2022-01-31 VITALS
TEMPERATURE: 98 F | BODY MASS INDEX: 34 KG/M2 | WEIGHT: 174.19 LBS | OXYGEN SATURATION: 100 % | HEART RATE: 72 BPM | DIASTOLIC BLOOD PRESSURE: 50 MMHG | SYSTOLIC BLOOD PRESSURE: 96 MMHG | RESPIRATION RATE: 16 BRPM

## 2022-01-31 DIAGNOSIS — O20.9 VAGINAL BLEEDING AFFECTING EARLY PREGNANCY: Primary | ICD-10-CM

## 2022-01-31 LAB
B-HCG SERPL-ACNC: 5387 MIU/ML
BILIRUB UR QL: NEGATIVE
COLOR UR: YELLOW
GLUCOSE UR-MCNC: NEGATIVE MG/DL
KETONES UR-MCNC: NEGATIVE MG/DL
NITRITE UR QL STRIP.AUTO: NEGATIVE
PH UR: 6 [PH] (ref 5–8)
PROT UR-MCNC: NEGATIVE MG/DL
SP GR UR STRIP: 1.01 (ref 1–1.03)
UROBILINOGEN UR STRIP-ACNC: <2

## 2022-01-31 PROCEDURE — 36415 COLL VENOUS BLD VENIPUNCTURE: CPT

## 2022-01-31 PROCEDURE — 76801 OB US < 14 WKS SINGLE FETUS: CPT | Performed by: EMERGENCY MEDICINE

## 2022-01-31 PROCEDURE — 87086 URINE CULTURE/COLONY COUNT: CPT | Performed by: EMERGENCY MEDICINE

## 2022-01-31 PROCEDURE — 84702 CHORIONIC GONADOTROPIN TEST: CPT | Performed by: EMERGENCY MEDICINE

## 2022-01-31 PROCEDURE — 99284 EMERGENCY DEPT VISIT MOD MDM: CPT

## 2022-01-31 PROCEDURE — 76817 TRANSVAGINAL US OBSTETRIC: CPT | Performed by: EMERGENCY MEDICINE

## 2022-01-31 PROCEDURE — 81001 URINALYSIS AUTO W/SCOPE: CPT | Performed by: EMERGENCY MEDICINE

## 2022-02-01 NOTE — ED INITIAL ASSESSMENT (HPI)
Pt is approx. 6 weeks pregnant c/o spotting after wiping, LLQ abd pain, radiating to the flank since yesterday. No NVD.

## 2022-02-01 NOTE — ED QUICK NOTES
Pt ambulatory out of ED with steady gait speaking clear sentences. Pt verbalized understanding of discharge orders and importance of follow ups. Jose Vasquez, ERT @bedside to translate.

## 2022-02-02 ENCOUNTER — TELEPHONE (OUTPATIENT)
Dept: OBGYN CLINIC | Facility: CLINIC | Age: 32
End: 2022-02-02

## 2022-02-02 PROBLEM — O00.201: Status: RESOLVED | Noted: 2021-06-01 | Resolved: 2022-02-02

## 2022-02-02 PROBLEM — O00.201 ECTOPIC PREGNANCY OF RIGHT OVARY: Status: RESOLVED | Noted: 2021-06-01 | Resolved: 2022-02-02

## 2022-02-02 NOTE — TELEPHONE ENCOUNTER
Agree with Triage advice given but if Dr Keyona Myles has an opening on Thursday or Friday then the patient can move up her F/U visit with him for this week instead of Monday.

## 2022-02-02 NOTE — TELEPHONE ENCOUNTER
Pt Name and  verified. OB History     T4    L4    SAB0  IAB0  Ectopic0  Multiple0  Live Births0     Pt is about 6wks and states that she had pink discharge on Monday and was at the ED. Pt states that she was discharged with instructions to just f/u. Has an OB US scheduled with [facility] on 22. She is calling because the pelvic pain has come back and once again only on her left side and noticed pink discharge. the discharge she has only noticed it upon wiping. She had a quant done on  and it was 949 and another done at ED which was 5,387. Would like to know if she needs to be seen sooner. Advised that this RN would recommend for her to monitor symptoms, if pain gets worse or has increased bleeding to go to ED. Advised message would be routed to provider on call for further recommendation.  pls advise

## 2022-02-02 NOTE — TELEPHONE ENCOUNTER
Pt called and offered apt tomorrow per [facility] at 2:40 for ED f/u and US. Pt Name and  verified. Pt informed and scheduled.

## 2022-02-03 ENCOUNTER — OFFICE VISIT (OUTPATIENT)
Dept: OBGYN CLINIC | Facility: CLINIC | Age: 32
End: 2022-02-03
Payer: COMMERCIAL

## 2022-02-03 VITALS — DIASTOLIC BLOOD PRESSURE: 74 MMHG | SYSTOLIC BLOOD PRESSURE: 122 MMHG

## 2022-02-03 DIAGNOSIS — N92.6 MISSED MENSES: Primary | ICD-10-CM

## 2022-02-03 DIAGNOSIS — O20.0 THREATENED ABORTION, ANTEPARTUM: ICD-10-CM

## 2022-02-03 PROCEDURE — 99212 OFFICE O/P EST SF 10 MIN: CPT | Performed by: OBSTETRICS & GYNECOLOGY

## 2022-02-03 PROCEDURE — 3078F DIAST BP <80 MM HG: CPT | Performed by: OBSTETRICS & GYNECOLOGY

## 2022-02-03 PROCEDURE — 3074F SYST BP LT 130 MM HG: CPT | Performed by: OBSTETRICS & GYNECOLOGY

## 2022-02-03 PROCEDURE — 76817 TRANSVAGINAL US OBSTETRIC: CPT | Performed by: OBSTETRICS & GYNECOLOGY

## 2022-02-10 ENCOUNTER — OFFICE VISIT (OUTPATIENT)
Dept: OBGYN CLINIC | Facility: CLINIC | Age: 32
End: 2022-02-10
Payer: COMMERCIAL

## 2022-02-10 VITALS — SYSTOLIC BLOOD PRESSURE: 110 MMHG | DIASTOLIC BLOOD PRESSURE: 66 MMHG

## 2022-02-10 DIAGNOSIS — N92.6 MISSED MENSES: Primary | ICD-10-CM

## 2022-02-10 PROCEDURE — 99212 OFFICE O/P EST SF 10 MIN: CPT | Performed by: OBSTETRICS & GYNECOLOGY

## 2022-02-10 PROCEDURE — 76817 TRANSVAGINAL US OBSTETRIC: CPT | Performed by: OBSTETRICS & GYNECOLOGY

## 2022-02-10 PROCEDURE — 3078F DIAST BP <80 MM HG: CPT | Performed by: OBSTETRICS & GYNECOLOGY

## 2022-02-10 PROCEDURE — 3074F SYST BP LT 130 MM HG: CPT | Performed by: OBSTETRICS & GYNECOLOGY

## 2022-02-17 ENCOUNTER — NURSE ONLY (OUTPATIENT)
Dept: OBGYN CLINIC | Facility: CLINIC | Age: 32
End: 2022-02-17
Payer: COMMERCIAL

## 2022-02-17 DIAGNOSIS — Z34.81 ENCOUNTER FOR SUPERVISION OF OTHER NORMAL PREGNANCY IN FIRST TRIMESTER: Primary | ICD-10-CM

## 2022-02-17 DIAGNOSIS — O09.299 HISTORY OF MACROSOMIA IN INFANT IN PRIOR PREGNANCY, CURRENTLY PREGNANT: ICD-10-CM

## 2022-02-17 NOTE — PROGRESS NOTES
Pt here today for RN [facility] Education. Missed menses apt with: MAGALI  LMP: 2021    Pre  BMI: 33.98  EPDS score: 4/30  +UPT at home:    +UPT in office: 2021    US: 2/10/2022  Working MARY: 10/2/2022 by ultrasound  Hx of genetic abnormality in family: Denies  Hx of varicella: unsure will order varicella IGG  Flu Vaccine: not received   Covid Vaccine: completed      Consent (if needed): n/a    Sterilization/Contraception: desires tubal    OUD Screening: Pt. Has answered NO 5P questions and has NO  risk factors. Pt. Given What pregnant women need to know handout. Educational material reviewed with patient: Prenatal care, nutrition, weight gain recommendations, travel, exercise, intercourse, pregnancy changes, safe medications, pregnancy and work, fetal movement, labor and  labor, warning signs, food safety, tdap, cord blood, breastfeeding-breast and bottle, circumcision-no, and Group B strep. Blood transfusion if needed: yes   PN labs: ordered plus Varicella titer and early 1 hour for history of macrosomia     Optional genetic screening labs were reviewed: Sandra Ortez, FTS with US, Quad screen MSAFP and CF screening.    Requesting genetic testing with [facility] Media Policy: aware     NOB apt: with MAGALI

## 2022-02-18 ENCOUNTER — TELEMEDICINE (OUTPATIENT)
Dept: FAMILY MEDICINE CLINIC | Facility: CLINIC | Age: 32
End: 2022-02-18
Payer: COMMERCIAL

## 2022-02-18 DIAGNOSIS — R11.0 NAUSEA: Primary | ICD-10-CM

## 2022-02-18 DIAGNOSIS — R51.9 ACUTE INTRACTABLE HEADACHE, UNSPECIFIED HEADACHE TYPE: ICD-10-CM

## 2022-02-18 PROCEDURE — 99203 OFFICE O/P NEW LOW 30 MIN: CPT | Performed by: FAMILY MEDICINE

## 2022-02-18 RX ORDER — DOXYLAMINE SUCCINATE AND PYRIDOXINE HYDROCHLORIDE, DELAYED RELEASE TABLETS 10 MG/10 MG 10; 10 MG/1; MG/1
2 TABLET, DELAYED RELEASE ORAL 2 TIMES DAILY PRN
Qty: 90 TABLET | Refills: 0 | Status: SHIPPED | OUTPATIENT
Start: 2022-02-18

## 2022-02-18 NOTE — PROGRESS NOTES
Virtual Telephone Check-In    Maxie Rinne LopezJeanetteJason verbally consents to a Virtual/Telephone Check-In service on 02/18/22. Patient understands and accepts financial responsibility for any deductible, co-insurance and/or co-pays associated with this service. Duration of the service: 15 minutes  This telemedicine visit was conducted using live audio and video. Summary of topics discussed:   6 weeks pregnant and has a frontal headache. Pain started 3 weeks ago - minimal pain. Pain is getting worse and now she is nauseous. Taking PNV, drinking water. States that she is staying physically active. Tylenol does not help at all. Physical Exam:  General: alert, speaking in full sentences, no acute distress  Lungs: respirations sound unlabored, no audible wheezing with speaking. Neurologic: alert, oriented x3    Assessment and plan:  1. Nausea  - doxylamine-pyridoxine 10-10 MG Oral Tab EC; Take 2 tablets by mouth 2 (two) times daily as needed. Dispense: 90 tablet; Refill: 0    2. Acute intractable headache, unspecified headache type  -Tylenol as needed, can try taking over-the-counter magnesium to 50 mg. Continue prenatal vitamin, start prenatal yoga, continue physical activity during pregnancy. Advised rest and adequate sleep and hydration. Discussed avoidance of ibuprofen or other pain medications. Advised to call back clinic if no improvement in symptoms. Red flags discussed to go to ER.      Cedric Angulo MD

## 2022-03-10 ENCOUNTER — INITIAL PRENATAL (OUTPATIENT)
Dept: OBGYN CLINIC | Facility: CLINIC | Age: 32
End: 2022-03-10
Payer: COMMERCIAL

## 2022-03-10 VITALS — WEIGHT: 176 LBS | SYSTOLIC BLOOD PRESSURE: 117 MMHG | BODY MASS INDEX: 34 KG/M2 | DIASTOLIC BLOOD PRESSURE: 72 MMHG

## 2022-03-10 DIAGNOSIS — Z34.81 ENCOUNTER FOR SUPERVISION OF OTHER NORMAL PREGNANCY IN FIRST TRIMESTER: Primary | ICD-10-CM

## 2022-03-10 PROBLEM — O99.210 OBESITY AFFECTING PREGNANCY, ANTEPARTUM (HCC): Status: ACTIVE | Noted: 2022-03-10

## 2022-03-10 PROBLEM — Z34.90 SUPERVISION OF NORMAL PREGNANCY (HCC): Status: ACTIVE | Noted: 2021-05-25

## 2022-03-10 PROBLEM — O09.299 HX OF MACROSOMIA IN INFANT IN PRIOR PREGNANCY, CURRENTLY PREGNANT: Status: ACTIVE | Noted: 2022-03-10

## 2022-03-10 PROBLEM — O20.0 THREATENED ABORTION, ANTEPARTUM (HCC): Status: RESOLVED | Noted: 2021-05-25 | Resolved: 2022-03-10

## 2022-03-10 PROBLEM — O09.299 HX OF MACROSOMIA IN INFANT IN PRIOR PREGNANCY, CURRENTLY PREGNANT (HCC): Status: ACTIVE | Noted: 2022-03-10

## 2022-03-10 PROBLEM — N92.6 MISSED MENSES: Status: RESOLVED | Noted: 2021-05-25 | Resolved: 2022-03-10

## 2022-03-10 PROBLEM — O99.210 OBESITY AFFECTING PREGNANCY, ANTEPARTUM: Status: ACTIVE | Noted: 2022-03-10

## 2022-03-10 PROBLEM — Z34.90 SUPERVISION OF NORMAL PREGNANCY: Status: ACTIVE | Noted: 2021-05-25

## 2022-03-10 PROBLEM — O20.0 THREATENED ABORTION, ANTEPARTUM: Status: RESOLVED | Noted: 2021-05-25 | Resolved: 2022-03-10

## 2022-03-10 LAB
BILIRUBIN: NEGATIVE
GLUCOSE (URINE DIPSTICK): NEGATIVE MG/DL
KETONES (URINE DIPSTICK): NEGATIVE MG/DL
MULTISTIX LOT#: ABNORMAL NUMERIC
NITRITE, URINE: NEGATIVE
OCCULT BLOOD: NEGATIVE
PH, URINE: 5.5 (ref 4.5–8)
PROTEIN (URINE DIPSTICK): NEGATIVE MG/DL
SPECIFIC GRAVITY: 1.02 (ref 1–1.03)
URINE-COLOR: YELLOW
UROBILINOGEN,SEMI-QN: 0.2 MG/DL (ref 0–1.9)

## 2022-03-10 PROCEDURE — 3074F SYST BP LT 130 MM HG: CPT | Performed by: OBSTETRICS & GYNECOLOGY

## 2022-03-10 PROCEDURE — 3078F DIAST BP <80 MM HG: CPT | Performed by: OBSTETRICS & GYNECOLOGY

## 2022-03-10 PROCEDURE — 81002 URINALYSIS NONAUTO W/O SCOPE: CPT | Performed by: OBSTETRICS & GYNECOLOGY

## 2022-03-10 RX ORDER — METOCLOPRAMIDE 10 MG/1
10 TABLET ORAL EVERY 6 HOURS PRN
Qty: 20 TABLET | Refills: 0 | Status: SHIPPED | OUTPATIENT
Start: 2022-03-10

## 2022-03-10 NOTE — PROGRESS NOTES
Complains of nausea and vomiting. Unrelieved with doxylamine and pyridoxine. Prescription for Reglan ordered. He was dizzy from time to time. Counseled on more appropriate diet. Patient with a history of 10 pound baby delivered at home in [place] without complication. Short second stage. Uncomplicated. No dystocia. Early diabetes screen. Is apparently interested in cell free DNA and was given [facility] information.

## 2022-03-11 LAB
C TRACH DNA SPEC QL NAA+PROBE: NEGATIVE
HPV I/H RISK 1 DNA SPEC QL NAA+PROBE: NEGATIVE
N GONORRHOEA DNA SPEC QL NAA+PROBE: NEGATIVE

## 2022-03-23 ENCOUNTER — TELEPHONE (OUTPATIENT)
Dept: OBGYN CLINIC | Facility: CLINIC | Age: 32
End: 2022-03-23

## 2022-03-23 ENCOUNTER — HOSPITAL ENCOUNTER (EMERGENCY)
Facility: HOSPITAL | Age: 32
Discharge: HOME | End: 2022-03-24
Attending: EMERGENCY MEDICINE
Payer: COMMERCIAL

## 2022-03-23 DIAGNOSIS — O21.0 HYPEREMESIS GRAVIDARUM: Primary | ICD-10-CM

## 2022-03-23 LAB
B-HCG UR QL: POSITIVE
BASOPHILS # BLD AUTO: 0.03 X10(3) UL (ref 0–0.2)
BASOPHILS NFR BLD AUTO: 0.4 %
BILIRUB UR QL: NEGATIVE
COLOR UR: YELLOW
DEPRECATED RDW RBC AUTO: 41.1 FL (ref 35.1–46.3)
EOSINOPHIL # BLD AUTO: 0.03 X10(3) UL (ref 0–0.7)
EOSINOPHIL NFR BLD AUTO: 0.4 %
ERYTHROCYTE [DISTWIDTH] IN BLOOD BY AUTOMATED COUNT: 12.6 % (ref 11–15)
GLUCOSE UR-MCNC: NEGATIVE MG/DL
HCT VFR BLD AUTO: 39.8 %
HGB BLD-MCNC: 13.6 G/DL
IMM GRANULOCYTES # BLD AUTO: 0.06 X10(3) UL (ref 0–1)
IMM GRANULOCYTES NFR BLD: 0.8 %
KETONES UR-MCNC: 80 MG/DL
LYMPHOCYTES # BLD AUTO: 1.28 X10(3) UL (ref 1–4)
LYMPHOCYTES NFR BLD AUTO: 17.6 %
MCH RBC QN AUTO: 30.7 PG (ref 26–34)
MCHC RBC AUTO-ENTMCNC: 34.2 G/DL (ref 31–37)
MONOCYTES # BLD AUTO: 0.41 X10(3) UL (ref 0.1–1)
MONOCYTES NFR BLD AUTO: 5.6 %
NEUTROPHILS # BLD AUTO: 5.46 X10 (3) UL (ref 1.5–7.7)
NEUTROPHILS # BLD AUTO: 5.46 X10(3) UL (ref 1.5–7.7)
NEUTROPHILS NFR BLD AUTO: 75.2 %
NITRITE UR QL STRIP.AUTO: NEGATIVE
PH UR: 6 [PH] (ref 5–8)
PLATELET # BLD AUTO: 165 10(3)UL (ref 150–450)
PROT UR-MCNC: NEGATIVE MG/DL
RBC # BLD AUTO: 4.43 X10(6)UL
SP GR UR STRIP: 1.02 (ref 1–1.03)
UROBILINOGEN UR STRIP-ACNC: <2
VARICELLA ZOSTER VIRUS$AB (IGG): 451.7 INDEX
VIT C UR-MCNC: NEGATIVE MG/DL
WBC # BLD AUTO: 7.3 X10(3) UL (ref 4–11)

## 2022-03-23 PROCEDURE — 85025 COMPLETE CBC W/AUTO DIFF WBC: CPT | Performed by: EMERGENCY MEDICINE

## 2022-03-23 PROCEDURE — 80076 HEPATIC FUNCTION PANEL: CPT | Performed by: EMERGENCY MEDICINE

## 2022-03-23 PROCEDURE — 80048 BASIC METABOLIC PNL TOTAL CA: CPT | Performed by: EMERGENCY MEDICINE

## 2022-03-23 PROCEDURE — 96374 THER/PROPH/DIAG INJ IV PUSH: CPT

## 2022-03-23 PROCEDURE — 81001 URINALYSIS AUTO W/SCOPE: CPT | Performed by: EMERGENCY MEDICINE

## 2022-03-23 PROCEDURE — 81025 URINE PREGNANCY TEST: CPT

## 2022-03-23 PROCEDURE — 83690 ASSAY OF LIPASE: CPT | Performed by: EMERGENCY MEDICINE

## 2022-03-23 PROCEDURE — 96361 HYDRATE IV INFUSION ADD-ON: CPT

## 2022-03-23 PROCEDURE — 87086 URINE CULTURE/COLONY COUNT: CPT | Performed by: EMERGENCY MEDICINE

## 2022-03-23 PROCEDURE — 99284 EMERGENCY DEPT VISIT MOD MDM: CPT

## 2022-03-23 RX ORDER — METOCLOPRAMIDE HYDROCHLORIDE 5 MG/ML
10 INJECTION INTRAMUSCULAR; INTRAVENOUS ONCE
Status: COMPLETED | OUTPATIENT
Start: 2022-03-23 | End: 2022-03-23

## 2022-03-23 NOTE — TELEPHONE ENCOUNTER
Pt Name and  verified. Patient informed and verbalized understanding. Pt did not complete other labs at the [facility] bc she was rejected due to her insurance. Pt states that she had never had an issue before and ins unsure why they would do this. Advised to go to [facility] but pt prefers to use one of our locations for now. Pt scheduled for tomorrow in the AM at [facility]. Pt to call if she encounters any issues. No other questions.

## 2022-03-23 NOTE — TELEPHONE ENCOUNTER
----- Message from Abi Aguilar MD sent at 3/23/2022 12:22 PM CDT -----  Please inform that she is immune to chickenpox.

## 2022-03-24 ENCOUNTER — ROUTINE PRENATAL (OUTPATIENT)
Dept: OBGYN CLINIC | Facility: CLINIC | Age: 32
End: 2022-03-24
Payer: COMMERCIAL

## 2022-03-24 ENCOUNTER — TELEPHONE (OUTPATIENT)
Dept: OBGYN CLINIC | Facility: CLINIC | Age: 32
End: 2022-03-24

## 2022-03-24 ENCOUNTER — LAB ENCOUNTER (OUTPATIENT)
Dept: LAB | Age: 32
End: 2022-03-24
Attending: OBSTETRICS & GYNECOLOGY
Payer: COMMERCIAL

## 2022-03-24 VITALS
WEIGHT: 178.38 LBS | BODY MASS INDEX: 34 KG/M2 | DIASTOLIC BLOOD PRESSURE: 69 MMHG | HEART RATE: 78 BPM | SYSTOLIC BLOOD PRESSURE: 115 MMHG

## 2022-03-24 VITALS
RESPIRATION RATE: 18 BRPM | SYSTOLIC BLOOD PRESSURE: 108 MMHG | OXYGEN SATURATION: 100 % | TEMPERATURE: 98 F | DIASTOLIC BLOOD PRESSURE: 65 MMHG | BODY MASS INDEX: 33.23 KG/M2 | HEIGHT: 61.02 IN | WEIGHT: 176 LBS | HEART RATE: 68 BPM

## 2022-03-24 DIAGNOSIS — Z34.82 ENCOUNTER FOR SUPERVISION OF OTHER NORMAL PREGNANCY IN SECOND TRIMESTER: Primary | ICD-10-CM

## 2022-03-24 DIAGNOSIS — Z34.81 ENCOUNTER FOR SUPERVISION OF OTHER NORMAL PREGNANCY IN FIRST TRIMESTER: ICD-10-CM

## 2022-03-24 DIAGNOSIS — O09.299 HISTORY OF MACROSOMIA IN INFANT IN PRIOR PREGNANCY, CURRENTLY PREGNANT: ICD-10-CM

## 2022-03-24 LAB
ALBUMIN SERPL-MCNC: 3.5 G/DL (ref 3.4–5)
ALP LIVER SERPL-CCNC: 53 U/L
ALT SERPL-CCNC: 25 U/L
ANION GAP SERPL CALC-SCNC: 8 MMOL/L (ref 0–18)
APPEARANCE: CLEAR
AST SERPL-CCNC: 16 U/L (ref 15–37)
BASOPHILS # BLD AUTO: 0.04 X10(3) UL (ref 0–0.2)
BASOPHILS NFR BLD AUTO: 0.7 %
BILIRUB DIRECT SERPL-MCNC: 0.1 MG/DL (ref 0–0.2)
BILIRUB SERPL-MCNC: 0.5 MG/DL (ref 0.1–2)
BILIRUBIN: NEGATIVE
BUN BLD-MCNC: 7 MG/DL (ref 7–18)
BUN/CREAT SERPL: 12.5 (ref 10–20)
CALCIUM BLD-MCNC: 9 MG/DL (ref 8.5–10.1)
CHLORIDE SERPL-SCNC: 104 MMOL/L (ref 98–112)
CO2 SERPL-SCNC: 26 MMOL/L (ref 21–32)
CREAT BLD-MCNC: 0.56 MG/DL
DEPRECATED RDW RBC AUTO: 41.1 FL (ref 35.1–46.3)
EOSINOPHIL # BLD AUTO: 0.09 X10(3) UL (ref 0–0.7)
EOSINOPHIL NFR BLD AUTO: 1.5 %
ERYTHROCYTE [DISTWIDTH] IN BLOOD BY AUTOMATED COUNT: 12.6 % (ref 11–15)
GLUCOSE (URINE DIPSTICK): NEGATIVE MG/DL
GLUCOSE 1H P GLC SERPL-MCNC: 148 MG/DL
GLUCOSE BLD-MCNC: 78 MG/DL (ref 70–99)
HBV SURFACE AG SER-ACNC: <0.1 [IU]/L
HBV SURFACE AG SERPL QL IA: NONREACTIVE
HCT VFR BLD AUTO: 35.9 %
HGB BLD-MCNC: 12.3 G/DL
IMM GRANULOCYTES # BLD AUTO: 0.07 X10(3) UL (ref 0–1)
IMM GRANULOCYTES NFR BLD: 1.2 %
KETONES (URINE DIPSTICK): NEGATIVE MG/DL
LIPASE SERPL-CCNC: 95 U/L (ref 73–393)
LYMPHOCYTES # BLD AUTO: 1.21 X10(3) UL (ref 1–4)
LYMPHOCYTES NFR BLD AUTO: 20.4 %
MCH RBC QN AUTO: 30.8 PG (ref 26–34)
MCHC RBC AUTO-ENTMCNC: 34.3 G/DL (ref 31–37)
MCV RBC AUTO: 89.8 FL
MONOCYTES # BLD AUTO: 0.36 X10(3) UL (ref 0.1–1)
MONOCYTES NFR BLD AUTO: 6.1 %
MULTISTIX LOT#: ABNORMAL NUMERIC
NEUTROPHILS # BLD AUTO: 4.17 X10 (3) UL (ref 1.5–7.7)
NEUTROPHILS # BLD AUTO: 4.17 X10(3) UL (ref 1.5–7.7)
NEUTROPHILS NFR BLD AUTO: 70.1 %
NITRITE, URINE: NEGATIVE
OSMOLALITY SERPL CALC.SUM OF ELEC: 283 MOSM/KG (ref 275–295)
PH, URINE: 5.5 (ref 4.5–8)
PLATELET # BLD AUTO: 157 10(3)UL (ref 150–450)
PROT SERPL-MCNC: 7 G/DL (ref 6.4–8.2)
PROTEIN (URINE DIPSTICK): NEGATIVE MG/DL
RBC # BLD AUTO: 4 X10(6)UL
RH BLOOD TYPE: POSITIVE
RUBV IGG SER QL: POSITIVE
RUBV IGG SER-ACNC: 30.3 IU/ML (ref 10–?)
SODIUM SERPL-SCNC: 138 MMOL/L (ref 136–145)
SPECIFIC GRAVITY: 1.02 (ref 1–1.03)
URINE-COLOR: YELLOW
UROBILINOGEN,SEMI-QN: 0.2 MG/DL (ref 0–1.9)
WBC # BLD AUTO: 5.9 X10(3) UL (ref 4–11)

## 2022-03-24 PROCEDURE — 87340 HEPATITIS B SURFACE AG IA: CPT

## 2022-03-24 PROCEDURE — 3074F SYST BP LT 130 MM HG: CPT | Performed by: OBSTETRICS & GYNECOLOGY

## 2022-03-24 PROCEDURE — S0028 INJECTION, FAMOTIDINE, 20 MG: HCPCS | Performed by: EMERGENCY MEDICINE

## 2022-03-24 PROCEDURE — 87389 HIV-1 AG W/HIV-1&-2 AB AG IA: CPT

## 2022-03-24 PROCEDURE — 3078F DIAST BP <80 MM HG: CPT | Performed by: OBSTETRICS & GYNECOLOGY

## 2022-03-24 PROCEDURE — 81002 URINALYSIS NONAUTO W/O SCOPE: CPT | Performed by: OBSTETRICS & GYNECOLOGY

## 2022-03-24 PROCEDURE — 86900 BLOOD TYPING SEROLOGIC ABO: CPT

## 2022-03-24 PROCEDURE — 36415 COLL VENOUS BLD VENIPUNCTURE: CPT

## 2022-03-24 PROCEDURE — 82950 GLUCOSE TEST: CPT

## 2022-03-24 PROCEDURE — 86762 RUBELLA ANTIBODY: CPT

## 2022-03-24 PROCEDURE — 86901 BLOOD TYPING SEROLOGIC RH(D): CPT

## 2022-03-24 PROCEDURE — 87086 URINE CULTURE/COLONY COUNT: CPT

## 2022-03-24 PROCEDURE — 85025 COMPLETE CBC W/AUTO DIFF WBC: CPT

## 2022-03-24 RX ORDER — FAMOTIDINE 10 MG/ML
20 INJECTION, SOLUTION INTRAVENOUS ONCE
Status: COMPLETED | OUTPATIENT
Start: 2022-03-24 | End: 2022-03-24

## 2022-03-24 RX ORDER — ONDANSETRON 4 MG/1
4 TABLET, ORALLY DISINTEGRATING ORAL EVERY 4 HOURS PRN
Qty: 10 TABLET | Refills: 0 | Status: SHIPPED | OUTPATIENT
Start: 2022-03-24 | End: 2022-03-31

## 2022-03-24 RX ORDER — FAMOTIDINE 20 MG/1
20 TABLET, FILM COATED ORAL 2 TIMES DAILY PRN
Qty: 30 TABLET | Refills: 0 | Status: SHIPPED | OUTPATIENT
Start: 2022-03-24 | End: 2022-04-23

## 2022-03-24 NOTE — TELEPHONE ENCOUNTER
Swazi phone line  #[phone] used to translate phone call. Pt called and informed of results and recommendations. Pt voices understanding. Pt placed in follow up book. ----- Message from Merari Hernandez MD sent at 3/24/2022  3:23 PM CDT -----  Please inform of elevated Glucola; needs 3 hour GTT.

## 2022-03-24 NOTE — TELEPHONE ENCOUNTER
This RN looked up pt through the [email or web address] portal before placing call and there is no specimen drawn for patient. Placed call and Pt Name and  verified. Pt informed that genetic testing has not been completed through [employer]. Pt thought she had completed her genetic testing when she went to [facility] to perform her OB labs. Pt informed that genetic testing kit for [facility] is in the  area here at [facility] for her to  and take with her so she can get this done. Pt voiced understanding and will come in later today to  the kit.  Routing to provider as Sonali Funk

## 2022-03-24 NOTE — TELEPHONE ENCOUNTER
Patient states that she had genetic screening and the records show that she was to have [address] cell free DNA however I do not see any results anywhere in epic. Please obtain results to review.

## 2022-03-24 NOTE — ED INITIAL ASSESSMENT (HPI)
Patient to ER from home with c/o vomiting with abdominal pain. Patient states she is 10 weeks pregnant A0. Denies vaginal bleeding or discharge. See .

## 2022-03-24 NOTE — PROGRESS NOTES
Had nausea and vomiting persisting and went to the emergency room. Received IV hydration and Zofran and has improved. Today did her prenatal blood work and also Glucola screen. Will be taking Zofran as needed.

## 2022-03-30 ENCOUNTER — HOSPITAL ENCOUNTER (EMERGENCY)
Facility: HOSPITAL | Age: 32
Discharge: HOME | End: 2022-03-30
Attending: EMERGENCY MEDICINE
Payer: COMMERCIAL

## 2022-03-30 ENCOUNTER — TELEPHONE (OUTPATIENT)
Dept: OBGYN CLINIC | Facility: CLINIC | Age: 32
End: 2022-03-30

## 2022-03-30 VITALS
TEMPERATURE: 98 F | RESPIRATION RATE: 18 BRPM | OXYGEN SATURATION: 98 % | HEART RATE: 71 BPM | DIASTOLIC BLOOD PRESSURE: 71 MMHG | SYSTOLIC BLOOD PRESSURE: 100 MMHG

## 2022-03-30 DIAGNOSIS — O21.9 NAUSEA AND VOMITING IN PREGNANCY: Primary | ICD-10-CM

## 2022-03-30 LAB
ANION GAP SERPL CALC-SCNC: 5 MMOL/L (ref 0–18)
BASOPHILS # BLD AUTO: 0.02 X10(3) UL (ref 0–0.2)
BASOPHILS NFR BLD AUTO: 0.3 %
BILIRUB UR QL: NEGATIVE
BUN BLD-MCNC: 10 MG/DL (ref 7–18)
BUN/CREAT SERPL: 15.2 (ref 10–20)
CALCIUM BLD-MCNC: 9.1 MG/DL (ref 8.5–10.1)
CHLORIDE SERPL-SCNC: 102 MMOL/L (ref 98–112)
CO2 SERPL-SCNC: 29 MMOL/L (ref 21–32)
COLOR UR: YELLOW
CREAT BLD-MCNC: 0.66 MG/DL
DEPRECATED RDW RBC AUTO: 40.9 FL (ref 35.1–46.3)
EOSINOPHIL # BLD AUTO: 0.06 X10(3) UL (ref 0–0.7)
EOSINOPHIL NFR BLD AUTO: 1 %
ERYTHROCYTE [DISTWIDTH] IN BLOOD BY AUTOMATED COUNT: 12.6 % (ref 11–15)
GLUCOSE BLD-MCNC: 78 MG/DL (ref 70–99)
GLUCOSE UR-MCNC: NEGATIVE MG/DL
HCT VFR BLD AUTO: 39.4 %
HGB BLD-MCNC: 13.5 G/DL
HGB UR QL STRIP.AUTO: NEGATIVE
IMM GRANULOCYTES # BLD AUTO: 0.05 X10(3) UL (ref 0–1)
IMM GRANULOCYTES NFR BLD: 0.8 %
KETONES UR-MCNC: NEGATIVE MG/DL
LYMPHOCYTES # BLD AUTO: 1.12 X10(3) UL (ref 1–4)
LYMPHOCYTES NFR BLD AUTO: 18.4 %
MCH RBC QN AUTO: 30.9 PG (ref 26–34)
MCHC RBC AUTO-ENTMCNC: 34.3 G/DL (ref 31–37)
MCV RBC AUTO: 90.2 FL
MONOCYTES # BLD AUTO: 0.52 X10(3) UL (ref 0.1–1)
MONOCYTES NFR BLD AUTO: 8.6 %
NEUTROPHILS # BLD AUTO: 4.31 X10 (3) UL (ref 1.5–7.7)
NEUTROPHILS # BLD AUTO: 4.31 X10(3) UL (ref 1.5–7.7)
NEUTROPHILS NFR BLD AUTO: 70.9 %
NITRITE UR QL STRIP.AUTO: NEGATIVE
OSMOLALITY SERPL CALC.SUM OF ELEC: 280 MOSM/KG (ref 275–295)
PH UR: 6 [PH] (ref 5–8)
PLATELET # BLD AUTO: 179 10(3)UL (ref 150–450)
POTASSIUM SERPL-SCNC: 3.6 MMOL/L (ref 3.5–5.1)
PROT UR-MCNC: NEGATIVE MG/DL
RBC # BLD AUTO: 4.37 X10(6)UL
SODIUM SERPL-SCNC: 136 MMOL/L (ref 136–145)
SP GR UR STRIP: 1.02 (ref 1–1.03)
UROBILINOGEN UR STRIP-ACNC: 4
VIT C UR-MCNC: NEGATIVE MG/DL
WBC # BLD AUTO: 6.1 X10(3) UL (ref 4–11)

## 2022-03-30 PROCEDURE — 96361 HYDRATE IV INFUSION ADD-ON: CPT

## 2022-03-30 PROCEDURE — 81001 URINALYSIS AUTO W/SCOPE: CPT | Performed by: EMERGENCY MEDICINE

## 2022-03-30 PROCEDURE — 96375 TX/PRO/DX INJ NEW DRUG ADDON: CPT

## 2022-03-30 PROCEDURE — 80048 BASIC METABOLIC PNL TOTAL CA: CPT | Performed by: EMERGENCY MEDICINE

## 2022-03-30 PROCEDURE — 87086 URINE CULTURE/COLONY COUNT: CPT | Performed by: EMERGENCY MEDICINE

## 2022-03-30 PROCEDURE — 85025 COMPLETE CBC W/AUTO DIFF WBC: CPT | Performed by: EMERGENCY MEDICINE

## 2022-03-30 PROCEDURE — 99284 EMERGENCY DEPT VISIT MOD MDM: CPT

## 2022-03-30 PROCEDURE — 96374 THER/PROPH/DIAG INJ IV PUSH: CPT

## 2022-03-30 RX ORDER — METOCLOPRAMIDE HYDROCHLORIDE 5 MG/ML
10 INJECTION INTRAMUSCULAR; INTRAVENOUS ONCE
Status: COMPLETED | OUTPATIENT
Start: 2022-03-30 | End: 2022-03-30

## 2022-03-30 RX ORDER — METOCLOPRAMIDE 5 MG/1
5 TABLET ORAL 3 TIMES DAILY PRN
Qty: 20 TABLET | Refills: 0 | Status: SHIPPED | OUTPATIENT
Start: 2022-03-30 | End: 2022-04-29

## 2022-03-30 RX ORDER — DIPHENHYDRAMINE HYDROCHLORIDE 50 MG/ML
25 INJECTION INTRAMUSCULAR; INTRAVENOUS ONCE
Status: COMPLETED | OUTPATIENT
Start: 2022-03-30 | End: 2022-03-30

## 2022-03-30 NOTE — TELEPHONE ENCOUNTER
Guatemalan pls  Pt is concerned, since 3/29 at noon pt has been throwing up even on an empty stomach,   Pt threw up all clear fluid, then yellow. Pt is feeling like there's a string in her throat.     Please advise

## 2022-03-30 NOTE — TELEPHONE ENCOUNTER
Pt Name and  verified. OB History     T1    L4    SAB0  IAB0  Ectopic1  Multiple0  Live Births0     Pt is having a hard time keeping her food down. Has not been able to keep anything down since last night. Pt states that she is starting to feel very weak and tired. Has taken Zofran and it didn't help. This RN advised pt to have someone take her to the ED so she can get IV fluids. Pt voiced understanding and will call her  or a family member to take her to ED now. Routing to on call provider as VIANNEY.

## 2022-03-30 NOTE — ED INITIAL ASSESSMENT (HPI)
Pt c/o abd pain and nausea for the last 2 days. Pts abd pain is over the whole belly not localized to one area. Pain does not radiate to flank.     Hebrew speaking only

## 2022-04-05 ENCOUNTER — TELEPHONE (OUTPATIENT)
Dept: OBGYN CLINIC | Facility: CLINIC | Age: 32
End: 2022-04-05

## 2022-04-05 NOTE — TELEPHONE ENCOUNTER
Incoming Fax received from Nina with patient's Panorama test results. Sample collection date:  3/29/2022  Report date: 4/3/2022    Results:  Low Risk  Low Risk for Aneuploidies and Microdeletions  Fetal Sex:  Male  Fetal Fraction:  4.7%    Report left on [facility] desk to review.

## 2022-04-05 NOTE — TELEPHONE ENCOUNTER
Patient name and  verified. Patient informed of results and also informed of gender. No further questions. Fusiform Excision Additional Text (Leave Blank If You Do Not Want): The margin was drawn around the clinically apparent lesion.  A fusiform shape was then drawn on the skin incorporating the lesion and margins.  Incisions were then made along these lines to the appropriate tissue plane and the lesion was extirpated.

## 2022-04-06 ENCOUNTER — MED REC SCAN ONLY (OUTPATIENT)
Dept: OBGYN CLINIC | Facility: CLINIC | Age: 32
End: 2022-04-06

## 2022-04-07 ENCOUNTER — ROUTINE PRENATAL (OUTPATIENT)
Dept: OBGYN CLINIC | Facility: CLINIC | Age: 32
End: 2022-04-07
Payer: COMMERCIAL

## 2022-04-07 VITALS — BODY MASS INDEX: 32 KG/M2 | DIASTOLIC BLOOD PRESSURE: 64 MMHG | WEIGHT: 172 LBS | SYSTOLIC BLOOD PRESSURE: 112 MMHG

## 2022-04-07 DIAGNOSIS — Z34.82 ENCOUNTER FOR SUPERVISION OF OTHER NORMAL PREGNANCY IN SECOND TRIMESTER: Primary | ICD-10-CM

## 2022-04-07 DIAGNOSIS — O99.210 OBESITY AFFECTING PREGNANCY, ANTEPARTUM: ICD-10-CM

## 2022-04-07 LAB
APPEARANCE: CLEAR
BILIRUBIN: NEGATIVE
GLUCOSE (URINE DIPSTICK): NEGATIVE MG/DL
KETONES (URINE DIPSTICK): NEGATIVE MG/DL
LEUKOCYTES: NEGATIVE
MULTISTIX LOT#: NORMAL NUMERIC
NITRITE, URINE: NEGATIVE
OCCULT BLOOD: NEGATIVE
PH, URINE: 6 (ref 4.5–8)
PROTEIN (URINE DIPSTICK): NEGATIVE MG/DL
SPECIFIC GRAVITY: 1.01 (ref 1–1.03)
URINE-COLOR: YELLOW
UROBILINOGEN,SEMI-QN: 0.2 MG/DL (ref 0–1.9)

## 2022-04-07 PROCEDURE — 3078F DIAST BP <80 MM HG: CPT | Performed by: OBSTETRICS & GYNECOLOGY

## 2022-04-07 PROCEDURE — 3074F SYST BP LT 130 MM HG: CPT | Performed by: OBSTETRICS & GYNECOLOGY

## 2022-04-07 PROCEDURE — 81002 URINALYSIS NONAUTO W/O SCOPE: CPT | Performed by: OBSTETRICS & GYNECOLOGY

## 2022-04-07 NOTE — PROGRESS NOTES
Patient reports nausea with vomiting this week but able to keep liquids down. Patient instructed to go to ER for IV hydration if not able to keep liquids down either. Patient expresses understanding. To schedule Level 2 ultrasound x 6 weeks.

## 2022-04-30 ENCOUNTER — APPOINTMENT (OUTPATIENT)
Dept: LAB | Facility: HOSPITAL | Age: 32
End: 2022-04-30
Attending: OBSTETRICS & GYNECOLOGY
Payer: MEDICAID

## 2022-04-30 ENCOUNTER — LABORATORY ENCOUNTER (OUTPATIENT)
Dept: LAB | Age: 32
End: 2022-04-30
Attending: OBSTETRICS & GYNECOLOGY
Payer: MEDICAID

## 2022-04-30 DIAGNOSIS — Z34.81 ENCOUNTER FOR SUPERVISION OF OTHER NORMAL PREGNANCY IN FIRST TRIMESTER: ICD-10-CM

## 2022-04-30 DIAGNOSIS — R73.09 ELEVATED GLUCOSE: ICD-10-CM

## 2022-04-30 DIAGNOSIS — O09.299 HISTORY OF MACROSOMIA IN INFANT IN PRIOR PREGNANCY, CURRENTLY PREGNANT: ICD-10-CM

## 2022-04-30 LAB — GLUCOSE P FAST SERPL-MCNC: 78 MG/DL

## 2022-04-30 PROCEDURE — 87086 URINE CULTURE/COLONY COUNT: CPT

## 2022-05-02 ENCOUNTER — TELEPHONE (OUTPATIENT)
Dept: PERINATAL CARE | Facility: HOSPITAL | Age: 32
End: 2022-05-02

## 2022-05-02 ENCOUNTER — ROUTINE PRENATAL (OUTPATIENT)
Dept: OBGYN CLINIC | Facility: CLINIC | Age: 32
End: 2022-05-02
Payer: MEDICAID

## 2022-05-02 VITALS — DIASTOLIC BLOOD PRESSURE: 63 MMHG | SYSTOLIC BLOOD PRESSURE: 96 MMHG | WEIGHT: 181 LBS | BODY MASS INDEX: 34 KG/M2

## 2022-05-02 DIAGNOSIS — O99.210 OBESITY AFFECTING PREGNANCY, ANTEPARTUM: ICD-10-CM

## 2022-05-02 DIAGNOSIS — Z34.82 ENCOUNTER FOR SUPERVISION OF OTHER NORMAL PREGNANCY IN SECOND TRIMESTER: Primary | ICD-10-CM

## 2022-05-02 LAB
APPEARANCE: CLEAR
BILIRUBIN: NEGATIVE
GLUCOSE (URINE DIPSTICK): NEGATIVE MG/DL
KETONES (URINE DIPSTICK): NEGATIVE MG/DL
LEUKOCYTES: NEGATIVE
MULTISTIX LOT#: NORMAL NUMERIC
NITRITE, URINE: NEGATIVE
OCCULT BLOOD: NEGATIVE
PH, URINE: 6 (ref 4.5–8)
PROTEIN (URINE DIPSTICK): NEGATIVE MG/DL
SPECIFIC GRAVITY: 1.03 (ref 1–1.03)
URINE-COLOR: YELLOW
UROBILINOGEN,SEMI-QN: 0.2 MG/DL (ref 0–1.9)

## 2022-05-27 ENCOUNTER — HOSPITAL ENCOUNTER (OUTPATIENT)
Dept: PERINATAL CARE | Facility: HOSPITAL | Age: 32
Discharge: HOME | End: 2022-05-27
Attending: OBSTETRICS & GYNECOLOGY
Payer: MEDICAID

## 2022-05-27 VITALS
HEART RATE: 92 BPM | SYSTOLIC BLOOD PRESSURE: 113 MMHG | WEIGHT: 181 LBS | BODY MASS INDEX: 34 KG/M2 | DIASTOLIC BLOOD PRESSURE: 60 MMHG

## 2022-05-27 DIAGNOSIS — O99.210 OBESITY AFFECTING PREGNANCY, ANTEPARTUM: Primary | ICD-10-CM

## 2022-05-27 DIAGNOSIS — O99.210 OBESITY AFFECTING PREGNANCY, ANTEPARTUM: ICD-10-CM

## 2022-05-27 PROCEDURE — 76811 OB US DETAILED SNGL FETUS: CPT | Performed by: OBSTETRICS & GYNECOLOGY

## 2022-05-31 ENCOUNTER — TELEPHONE (OUTPATIENT)
Dept: PERINATAL CARE | Facility: HOSPITAL | Age: 32
End: 2022-05-31

## 2022-06-06 ENCOUNTER — ROUTINE PRENATAL (OUTPATIENT)
Dept: OBGYN CLINIC | Facility: CLINIC | Age: 32
End: 2022-06-06
Payer: MEDICAID

## 2022-06-06 VITALS — SYSTOLIC BLOOD PRESSURE: 120 MMHG | BODY MASS INDEX: 34 KG/M2 | DIASTOLIC BLOOD PRESSURE: 72 MMHG | WEIGHT: 181 LBS

## 2022-06-06 DIAGNOSIS — O24.419 GESTATIONAL DIABETES MELLITUS (GDM) IN SECOND TRIMESTER, GESTATIONAL DIABETES METHOD OF CONTROL UNSPECIFIED: ICD-10-CM

## 2022-06-06 DIAGNOSIS — Z34.82 ENCOUNTER FOR SUPERVISION OF OTHER NORMAL PREGNANCY IN SECOND TRIMESTER: Primary | ICD-10-CM

## 2022-06-06 LAB
APPEARANCE: CLEAR
BILIRUBIN: NEGATIVE
GLUCOSE (URINE DIPSTICK): NEGATIVE MG/DL
KETONES (URINE DIPSTICK): NEGATIVE MG/DL
LEUKOCYTES: NEGATIVE
MULTISTIX LOT#: 1062 NUMERIC
NITRITE, URINE: NEGATIVE
OCCULT BLOOD: NEGATIVE
PH, URINE: 7 (ref 4.5–8)
PROTEIN (URINE DIPSTICK): NEGATIVE MG/DL
SPECIFIC GRAVITY: 1.02 (ref 1–1.03)
URINE-COLOR: YELLOW
UROBILINOGEN,SEMI-QN: 0.2 MG/DL (ref 0–1.9)

## 2022-06-06 NOTE — PROGRESS NOTES
Good fm. Pt stated she threw up her 3 hr gtt and unable to complete. Counseled pt and pt referred to [facility] diabetes center for testing/counseling.

## 2022-06-10 ENCOUNTER — LAB ENCOUNTER (OUTPATIENT)
Dept: LAB | Age: 32
End: 2022-06-10
Attending: OBSTETRICS & GYNECOLOGY
Payer: MEDICAID

## 2022-06-10 DIAGNOSIS — Z34.82 ENCOUNTER FOR SUPERVISION OF OTHER NORMAL PREGNANCY IN SECOND TRIMESTER: ICD-10-CM

## 2022-06-10 LAB
BASOPHILS # BLD AUTO: 0.02 X10(3) UL (ref 0–0.2)
BASOPHILS NFR BLD AUTO: 0.4 %
DEPRECATED RDW RBC AUTO: 45.8 FL (ref 35.1–46.3)
EOSINOPHIL # BLD AUTO: 0.08 X10(3) UL (ref 0–0.7)
EOSINOPHIL NFR BLD AUTO: 1.4 %
ERYTHROCYTE [DISTWIDTH] IN BLOOD BY AUTOMATED COUNT: 13.4 % (ref 11–15)
HCT VFR BLD AUTO: 33.2 %
HGB BLD-MCNC: 10.9 G/DL
IMM GRANULOCYTES # BLD AUTO: 0.08 X10(3) UL (ref 0–1)
IMM GRANULOCYTES NFR BLD: 1.4 %
LYMPHOCYTES # BLD AUTO: 1.17 X10(3) UL (ref 1–4)
LYMPHOCYTES NFR BLD AUTO: 20.6 %
MCH RBC QN AUTO: 31 PG (ref 26–34)
MCHC RBC AUTO-ENTMCNC: 32.8 G/DL (ref 31–37)
MCV RBC AUTO: 94.3 FL
MONOCYTES # BLD AUTO: 0.51 X10(3) UL (ref 0.1–1)
MONOCYTES NFR BLD AUTO: 9 %
NEUTROPHILS # BLD AUTO: 3.82 X10 (3) UL (ref 1.5–7.7)
NEUTROPHILS # BLD AUTO: 3.82 X10(3) UL (ref 1.5–7.7)
NEUTROPHILS NFR BLD AUTO: 67.2 %
PLATELET # BLD AUTO: 160 10(3)UL (ref 150–450)
RBC # BLD AUTO: 3.52 X10(6)UL
WBC # BLD AUTO: 5.7 X10(3) UL (ref 4–11)

## 2022-06-10 PROCEDURE — 36415 COLL VENOUS BLD VENIPUNCTURE: CPT

## 2022-06-10 PROCEDURE — 85025 COMPLETE CBC W/AUTO DIFF WBC: CPT

## 2022-06-13 ENCOUNTER — TELEPHONE (OUTPATIENT)
Dept: OBGYN CLINIC | Facility: CLINIC | Age: 32
End: 2022-06-13

## 2022-06-13 NOTE — TELEPHONE ENCOUNTER
Patient name and  verified. Patient informed of result note and recommendations. Patient states she will buy otc iron. No further questions.

## 2022-06-13 NOTE — TELEPHONE ENCOUNTER
----- Message from Tayla Fountain MD sent at 6/13/2022 11:51 AM CDT -----  Anemia of 10. 9.  pt to take FE daily plus PNV daily

## 2022-06-15 ENCOUNTER — HOSPITAL ENCOUNTER (OUTPATIENT)
Dept: ENDOCRINOLOGY | Age: 32
Discharge: HOME | End: 2022-06-15
Attending: FAMILY MEDICINE
Payer: MEDICAID

## 2022-06-15 DIAGNOSIS — O24.410 DIET CONTROLLED GESTATIONAL DIABETES MELLITUS (GDM) IN THIRD TRIMESTER: Primary | ICD-10-CM

## 2022-06-15 NOTE — PROGRESS NOTES
Lima Christopher  : 1990 was seen for Gestational Diabetes Counseling: Individual Niuean speaking   24 weeks gestation      Date: 6/15/2022   Start time: 0900 End time: 1000    Obtained usual diet history: Will have eggs, some chicken and two tortillas with water  L: rice and beans with small amount of chicken, 2-3 tortillas  D same    Education:     GDM Overview:  Reviewed gestational diabetes as diagnosis including target blood glucose values. Benefits, risks, and management options for improving/maintaining glucose control to mother/baby discussed. Instructed /demonstrated ability to perform blood glucose testing on: Contour next    Discussed monitoring ketones. Healthy Eating:  Discussed nutrition concepts for pregnancy/healthy eating and effects of food on BG value. Timing of meals; what is a carbohydrate, protein, fat. Taught: Carb counting, label reading, meal planning. Suggested Calorie Level: 2000    Being Active:  Benefits and effects of activity on BG discussed. Monitoring:  Instructed on how to use glucose monitor/proper lancet disposal. Testing schedules are:   Fastin-95 mg/dL, Call MD if >95 mg/dL twice in 1 week   2 Hour Post Prandial:  120 md/dL, Call MD if >120 mg/dL twice in 1 week. Taking Medication:  Reviewed when medication might be indicated. Reducing Risk:  Effects of elevated blood glucose on mother/baby reviewed. Discussed management (hyperglycemia, hypoglycemia, sick day, DKA, other) and when to call provider. Post pregnancy management/prevention of Type 2 DM, and increased risk of having diabetes later in life reviewed. Healthy Coping:  Family involvement/social support encouraged. Identification of lifestyle behaviors willing to change discussed. Training Tools Provided:  Printed materials covering each topic provided. Support Plan provided and reviewed. Patient Chosen Goals:      1. Follow recommended GDM meal plan.    2. Begin testing fasting glucose and 2 hours after meals   3. Bring glucose log to each MD office visit. 4. Encouraged activity if no restrictions. 5. Encouraged Miguelina Macias to call diabetes center with any questions or concerns. Patient verbalized understanding and has no further questions at this time.     Ben Gamez, RN,MSN,[facility]

## 2022-07-05 ENCOUNTER — HOSPITAL ENCOUNTER (OUTPATIENT)
Facility: HOSPITAL | Age: 32
Discharge: HOME | End: 2022-07-05
Attending: OBSTETRICS & GYNECOLOGY | Admitting: OBSTETRICS & GYNECOLOGY
Payer: MEDICAID

## 2022-07-05 VITALS
HEART RATE: 79 BPM | RESPIRATION RATE: 16 BRPM | SYSTOLIC BLOOD PRESSURE: 98 MMHG | TEMPERATURE: 99 F | DIASTOLIC BLOOD PRESSURE: 46 MMHG

## 2022-07-05 LAB
BASOPHILS # BLD AUTO: 0.02 X10(3) UL (ref 0–0.2)
BASOPHILS NFR BLD AUTO: 0.3 %
DEPRECATED RDW RBC AUTO: 45.3 FL (ref 35.1–46.3)
EOSINOPHIL # BLD AUTO: 0.09 X10(3) UL (ref 0–0.7)
EOSINOPHIL NFR BLD AUTO: 1.5 %
ERYTHROCYTE [DISTWIDTH] IN BLOOD BY AUTOMATED COUNT: 13.3 % (ref 11–15)
HCT VFR BLD AUTO: 33.2 %
HGB BLD-MCNC: 10.7 G/DL
HGB F MFR BLD KLEIH BETKE: <0.1 %
IMM GRANULOCYTES # BLD AUTO: 0.09 X10(3) UL (ref 0–1)
IMM GRANULOCYTES NFR BLD: 1.5 %
LYMPHOCYTES # BLD AUTO: 1.18 X10(3) UL (ref 1–4)
LYMPHOCYTES NFR BLD AUTO: 19.1 %
MCH RBC QN AUTO: 30.1 PG (ref 26–34)
MCHC RBC AUTO-ENTMCNC: 32.2 G/DL (ref 31–37)
MCV RBC AUTO: 93.3 FL
MONOCYTES # BLD AUTO: 0.57 X10(3) UL (ref 0.1–1)
MONOCYTES NFR BLD AUTO: 9.2 %
NEUTROPHILS # BLD AUTO: 4.24 X10 (3) UL (ref 1.5–7.7)
NEUTROPHILS # BLD AUTO: 4.24 X10(3) UL (ref 1.5–7.7)
NEUTROPHILS NFR BLD AUTO: 68.4 %
PLATELET # BLD AUTO: 144 10(3)UL (ref 150–450)
RBC # BLD AUTO: 3.56 X10(6)UL
WBC # BLD AUTO: 6.2 X10(3) UL (ref 4–11)

## 2022-07-05 PROCEDURE — 99213 OFFICE O/P EST LOW 20 MIN: CPT

## 2022-07-05 PROCEDURE — 85025 COMPLETE CBC W/AUTO DIFF WBC: CPT | Performed by: OBSTETRICS & GYNECOLOGY

## 2022-07-05 PROCEDURE — 36415 COLL VENOUS BLD VENIPUNCTURE: CPT

## 2022-07-05 PROCEDURE — 85460 HEMOGLOBIN FETAL: CPT | Performed by: OBSTETRICS & GYNECOLOGY

## 2022-07-05 RX ORDER — CHOLECALCIFEROL (VITAMIN D3) 25 MCG
1 TABLET,CHEWABLE ORAL DAILY
COMMUNITY
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
## 2022-07-08 ENCOUNTER — ROUTINE PRENATAL (OUTPATIENT)
Dept: OBGYN CLINIC | Facility: CLINIC | Age: 32
End: 2022-07-08
Payer: MEDICAID

## 2022-07-08 VITALS — DIASTOLIC BLOOD PRESSURE: 60 MMHG | BODY MASS INDEX: 35 KG/M2 | SYSTOLIC BLOOD PRESSURE: 100 MMHG | WEIGHT: 184.38 LBS

## 2022-07-08 DIAGNOSIS — R82.90 ABNORMAL URINE: ICD-10-CM

## 2022-07-08 DIAGNOSIS — Z34.83 ENCOUNTER FOR SUPERVISION OF OTHER NORMAL PREGNANCY IN THIRD TRIMESTER: Primary | ICD-10-CM

## 2022-07-08 LAB
APPEARANCE: CLEAR
BILIRUBIN: NEGATIVE
GLUCOSE (URINE DIPSTICK): NEGATIVE MG/DL
KETONES (URINE DIPSTICK): NEGATIVE MG/DL
MULTISTIX LOT#: ABNORMAL NUMERIC
NITRITE, URINE: POSITIVE
PH, URINE: 7 (ref 4.5–8)
PROTEIN (URINE DIPSTICK): NEGATIVE MG/DL
SPECIFIC GRAVITY: 1.02 (ref 1–1.03)
URINE-COLOR: YELLOW
UROBILINOGEN,SEMI-QN: 0.2 MG/DL (ref 0–1.9)

## 2022-07-08 PROCEDURE — 3078F DIAST BP <80 MM HG: CPT | Performed by: OBSTETRICS & GYNECOLOGY

## 2022-07-08 PROCEDURE — 81003 URINALYSIS AUTO W/O SCOPE: CPT | Performed by: OBSTETRICS & GYNECOLOGY

## 2022-07-08 PROCEDURE — 0502F SUBSEQUENT PRENATAL CARE: CPT | Performed by: OBSTETRICS & GYNECOLOGY

## 2022-07-08 PROCEDURE — 3074F SYST BP LT 130 MM HG: CPT | Performed by: OBSTETRICS & GYNECOLOGY

## 2022-07-08 NOTE — PROGRESS NOTES
Good fm. Pt has varicose veins on legs, pt stands at work all day, pt wants to continue working, advised use jobst stockings and note given so pt can have 15 min breaks every 2 hours so she can rest and sit. Pt happy with this.

## 2022-07-16 ENCOUNTER — HOSPITAL ENCOUNTER (EMERGENCY)
Facility: HOSPITAL | Age: 32
Discharge: HOME | End: 2022-07-17
Attending: EMERGENCY MEDICINE
Payer: MEDICAID

## 2022-07-16 DIAGNOSIS — I87.2 VENOUS INSUFFICIENCY: Primary | ICD-10-CM

## 2022-07-16 DIAGNOSIS — Z3A.27 27 WEEKS GESTATION OF PREGNANCY: ICD-10-CM

## 2022-07-16 DIAGNOSIS — M53.3 BACK PAIN, SACROILIAC: ICD-10-CM

## 2022-07-16 DIAGNOSIS — R10.9 ABDOMINAL PAIN OF UNKNOWN ETIOLOGY: ICD-10-CM

## 2022-07-16 PROCEDURE — 99284 EMERGENCY DEPT VISIT MOD MDM: CPT

## 2022-07-17 ENCOUNTER — APPOINTMENT (OUTPATIENT)
Dept: ULTRASOUND IMAGING | Facility: HOSPITAL | Age: 32
End: 2022-07-17
Attending: EMERGENCY MEDICINE
Payer: MEDICAID

## 2022-07-17 ENCOUNTER — APPOINTMENT (OUTPATIENT)
Dept: ULTRASOUND IMAGING | Facility: HOSPITAL | Age: 32
End: 2022-07-17
Attending: OBSTETRICS & GYNECOLOGY
Payer: MEDICAID

## 2022-07-17 ENCOUNTER — HOSPITAL ENCOUNTER (OUTPATIENT)
Facility: HOSPITAL | Age: 32
Discharge: HOME | End: 2022-07-17
Attending: OBSTETRICS & GYNECOLOGY | Admitting: OBSTETRICS & GYNECOLOGY
Payer: MEDICAID

## 2022-07-17 VITALS
HEART RATE: 73 BPM | DIASTOLIC BLOOD PRESSURE: 53 MMHG | WEIGHT: 183 LBS | SYSTOLIC BLOOD PRESSURE: 102 MMHG | RESPIRATION RATE: 20 BRPM | TEMPERATURE: 98 F | HEIGHT: 64 IN | OXYGEN SATURATION: 98 % | BODY MASS INDEX: 31.24 KG/M2

## 2022-07-17 VITALS
TEMPERATURE: 98 F | HEART RATE: 72 BPM | DIASTOLIC BLOOD PRESSURE: 50 MMHG | RESPIRATION RATE: 16 BRPM | SYSTOLIC BLOOD PRESSURE: 101 MMHG

## 2022-07-17 PROCEDURE — 93971 EXTREMITY STUDY: CPT | Performed by: EMERGENCY MEDICINE

## 2022-07-17 PROCEDURE — 76819 FETAL BIOPHYS PROFIL W/O NST: CPT | Performed by: OBSTETRICS & GYNECOLOGY

## 2022-07-17 NOTE — ED INITIAL ASSESSMENT (HPI)
#[id number]. Patient to ER from home with c/o swelling to lower legs and rectal pain. Patient is 27 weeks pregnant.

## 2022-07-17 NOTE — ED PROVIDER NOTES
Patient signed out to me by Dr. Symone Chester. Patient's left lower extremity ultrasound negative for DVT.   Patient discharged from the ER and instructed to go to NST for further monitoring

## 2022-07-20 ENCOUNTER — ROUTINE PRENATAL (OUTPATIENT)
Dept: OBGYN CLINIC | Facility: CLINIC | Age: 32
End: 2022-07-20
Payer: MEDICAID

## 2022-07-20 VITALS — SYSTOLIC BLOOD PRESSURE: 102 MMHG | WEIGHT: 184.63 LBS | DIASTOLIC BLOOD PRESSURE: 62 MMHG | BODY MASS INDEX: 32 KG/M2

## 2022-07-20 DIAGNOSIS — O24.410 DIET CONTROLLED GESTATIONAL DIABETES MELLITUS (GDM) IN THIRD TRIMESTER: ICD-10-CM

## 2022-07-20 DIAGNOSIS — O43.199 MARGINAL INSERTION OF UMBILICAL CORD AFFECTING MANAGEMENT OF MOTHER: ICD-10-CM

## 2022-07-20 DIAGNOSIS — O99.210 OBESITY AFFECTING PREGNANCY, ANTEPARTUM: ICD-10-CM

## 2022-07-20 DIAGNOSIS — Z34.83 ENCOUNTER FOR SUPERVISION OF OTHER NORMAL PREGNANCY IN THIRD TRIMESTER: Primary | ICD-10-CM

## 2022-07-20 LAB
APPEARANCE: CLEAR
BILIRUBIN: NEGATIVE
GLUCOSE (URINE DIPSTICK): NEGATIVE MG/DL
KETONES (URINE DIPSTICK): NEGATIVE MG/DL
MULTISTIX LOT#: 2030 NUMERIC
NITRITE, URINE: NEGATIVE
OCCULT BLOOD: NEGATIVE
PH, URINE: 6.5 (ref 4.5–8)
PROTEIN (URINE DIPSTICK): NEGATIVE MG/DL
SPECIFIC GRAVITY: 1.02 (ref 1–1.03)
URINE-COLOR: YELLOW
UROBILINOGEN,SEMI-QN: 0.2 MG/DL (ref 0–1.9)

## 2022-07-20 PROCEDURE — 81002 URINALYSIS NONAUTO W/O SCOPE: CPT | Performed by: OBSTETRICS & GYNECOLOGY

## 2022-07-20 PROCEDURE — 3074F SYST BP LT 130 MM HG: CPT | Performed by: OBSTETRICS & GYNECOLOGY

## 2022-07-20 PROCEDURE — 0502F SUBSEQUENT PRENATAL CARE: CPT | Performed by: OBSTETRICS & GYNECOLOGY

## 2022-07-20 PROCEDURE — 3078F DIAST BP <80 MM HG: CPT | Performed by: OBSTETRICS & GYNECOLOGY

## 2022-08-08 ENCOUNTER — HOSPITAL ENCOUNTER (OUTPATIENT)
Dept: PERINATAL CARE | Facility: HOSPITAL | Age: 32
End: 2022-08-08
Attending: OBSTETRICS & GYNECOLOGY
Payer: MEDICAID

## 2022-08-08 ENCOUNTER — LAB ENCOUNTER (OUTPATIENT)
Dept: LAB | Facility: HOSPITAL | Age: 32
End: 2022-08-08
Attending: OBSTETRICS & GYNECOLOGY
Payer: MEDICAID

## 2022-08-08 ENCOUNTER — HOSPITAL ENCOUNTER (OUTPATIENT)
Dept: PERINATAL CARE | Facility: HOSPITAL | Age: 32
Discharge: HOME | End: 2022-08-08
Attending: OBSTETRICS & GYNECOLOGY
Payer: MEDICAID

## 2022-08-08 VITALS
HEART RATE: 84 BPM | BODY MASS INDEX: 31 KG/M2 | WEIGHT: 183 LBS | SYSTOLIC BLOOD PRESSURE: 106 MMHG | DIASTOLIC BLOOD PRESSURE: 68 MMHG

## 2022-08-08 DIAGNOSIS — Z34.83 ENCOUNTER FOR SUPERVISION OF OTHER NORMAL PREGNANCY IN THIRD TRIMESTER: ICD-10-CM

## 2022-08-08 DIAGNOSIS — O24.410 DIET CONTROLLED GESTATIONAL DIABETES MELLITUS (GDM) IN THIRD TRIMESTER: ICD-10-CM

## 2022-08-08 DIAGNOSIS — O43.199 MARGINAL INSERTION OF UMBILICAL CORD AFFECTING MANAGEMENT OF MOTHER: ICD-10-CM

## 2022-08-08 DIAGNOSIS — O24.419 GESTATIONAL DIABETES: ICD-10-CM

## 2022-08-08 DIAGNOSIS — O99.210 OBESITY AFFECTING PREGNANCY, ANTEPARTUM: Primary | ICD-10-CM

## 2022-08-08 DIAGNOSIS — O99.210 OBESITY AFFECTING PREGNANCY, ANTEPARTUM: ICD-10-CM

## 2022-08-08 LAB
DEPRECATED HBV CORE AB SER IA-ACNC: 5.1 NG/ML
EST. AVERAGE GLUCOSE BLD GHB EST-MCNC: 94 MG/DL (ref 68–126)
HBA1C MFR BLD: 4.9 % (ref ?–5.7)

## 2022-08-08 PROCEDURE — 83036 HEMOGLOBIN GLYCOSYLATED A1C: CPT

## 2022-08-08 PROCEDURE — 36415 COLL VENOUS BLD VENIPUNCTURE: CPT

## 2022-08-08 PROCEDURE — 76816 OB US FOLLOW-UP PER FETUS: CPT | Performed by: OBSTETRICS & GYNECOLOGY

## 2022-08-08 PROCEDURE — 82728 ASSAY OF FERRITIN: CPT

## 2022-08-08 PROCEDURE — 76819 FETAL BIOPHYS PROFIL W/O NST: CPT

## 2022-08-08 NOTE — PROGRESS NOTES
Pt for Growth US  Denies pregnancy complaints  States active fetus    Pt not checking blood glucoses needs A1c ordered by OB

## 2022-08-09 ENCOUNTER — TELEPHONE (OUTPATIENT)
Dept: OBGYN CLINIC | Facility: CLINIC | Age: 32
End: 2022-08-09

## 2022-08-09 ENCOUNTER — HOSPITAL ENCOUNTER (OUTPATIENT)
Facility: HOSPITAL | Age: 32
Discharge: HOME | End: 2022-08-09
Attending: OBSTETRICS & GYNECOLOGY | Admitting: OBSTETRICS & GYNECOLOGY
Payer: MEDICAID

## 2022-08-09 ENCOUNTER — ROUTINE PRENATAL (OUTPATIENT)
Dept: OBGYN CLINIC | Facility: CLINIC | Age: 32
End: 2022-08-09
Payer: MEDICAID

## 2022-08-09 ENCOUNTER — TELEPHONE (OUTPATIENT)
Dept: PERINATAL CARE | Facility: HOSPITAL | Age: 32
End: 2022-08-09

## 2022-08-09 VITALS — BODY MASS INDEX: 31 KG/M2 | WEIGHT: 181 LBS | DIASTOLIC BLOOD PRESSURE: 72 MMHG | SYSTOLIC BLOOD PRESSURE: 112 MMHG

## 2022-08-09 VITALS — SYSTOLIC BLOOD PRESSURE: 109 MMHG | DIASTOLIC BLOOD PRESSURE: 58 MMHG | HEART RATE: 73 BPM

## 2022-08-09 DIAGNOSIS — R80.9 PROTEINURIA: Primary | ICD-10-CM

## 2022-08-09 DIAGNOSIS — O09.629 SUPERVISION OF HIGH-RISK PREGNANCY OF YOUNG MULTIGRAVIDA: Primary | ICD-10-CM

## 2022-08-09 DIAGNOSIS — O24.410 DIET CONTROLLED GESTATIONAL DIABETES MELLITUS (GDM) IN THIRD TRIMESTER: ICD-10-CM

## 2022-08-09 LAB
ALBUMIN SERPL-MCNC: 2.5 G/DL (ref 3.4–5)
ALBUMIN/GLOB SERPL: 0.7 {RATIO} (ref 1–2)
ALP LIVER SERPL-CCNC: 109 U/L
ALT SERPL-CCNC: 13 U/L
ANION GAP SERPL CALC-SCNC: 7 MMOL/L (ref 0–18)
AST SERPL-CCNC: 13 U/L (ref 15–37)
BASOPHILS # BLD AUTO: 0.01 X10(3) UL (ref 0–0.2)
BASOPHILS NFR BLD AUTO: 0.2 %
BILIRUB SERPL-MCNC: 0.5 MG/DL (ref 0.1–2)
BUN BLD-MCNC: 7 MG/DL (ref 7–18)
BUN/CREAT SERPL: 13.5 (ref 10–20)
CALCIUM BLD-MCNC: 8.7 MG/DL (ref 8.5–10.1)
CHLORIDE SERPL-SCNC: 107 MMOL/L (ref 98–112)
CO2 SERPL-SCNC: 25 MMOL/L (ref 21–32)
CREAT BLD-MCNC: 0.52 MG/DL
CREAT UR-SCNC: 294 MG/DL
DEPRECATED RDW RBC AUTO: 46.6 FL (ref 35.1–46.3)
EOSINOPHIL # BLD AUTO: 0.07 X10(3) UL (ref 0–0.7)
EOSINOPHIL NFR BLD AUTO: 1.2 %
ERYTHROCYTE [DISTWIDTH] IN BLOOD BY AUTOMATED COUNT: 14 % (ref 11–15)
FASTING STATUS PATIENT QL REPORTED: NO
GFR SERPLBLD BASED ON 1.73 SQ M-ARVRAT: 127 ML/MIN/1.73M2 (ref 60–?)
GLOBULIN PLAS-MCNC: 3.7 G/DL (ref 2.8–4.4)
GLUCOSE BLD-MCNC: 87 MG/DL (ref 70–99)
HCT VFR BLD AUTO: 35.2 %
HGB BLD-MCNC: 11.3 G/DL
IMM GRANULOCYTES # BLD AUTO: 0.03 X10(3) UL (ref 0–1)
IMM GRANULOCYTES NFR BLD: 0.5 %
LYMPHOCYTES # BLD AUTO: 0.96 X10(3) UL (ref 1–4)
LYMPHOCYTES NFR BLD AUTO: 16.7 %
MCH RBC QN AUTO: 29.4 PG (ref 26–34)
MCHC RBC AUTO-ENTMCNC: 32.1 G/DL (ref 31–37)
MCV RBC AUTO: 91.4 FL
MONOCYTES # BLD AUTO: 0.51 X10(3) UL (ref 0.1–1)
MONOCYTES NFR BLD AUTO: 8.9 %
NEUTROPHILS # BLD AUTO: 4.18 X10 (3) UL (ref 1.5–7.7)
NEUTROPHILS # BLD AUTO: 4.18 X10(3) UL (ref 1.5–7.7)
NEUTROPHILS NFR BLD AUTO: 72.5 %
OSMOLALITY SERPL CALC.SUM OF ELEC: 285 MOSM/KG (ref 275–295)
PLATELET # BLD AUTO: 152 10(3)UL (ref 150–450)
POTASSIUM SERPL-SCNC: 3.6 MMOL/L (ref 3.5–5.1)
PROT SERPL-MCNC: 6.2 G/DL (ref 6.4–8.2)
PROT UR-MCNC: 54 MG/DL
PROT/CREAT UR-RTO: 0.18
RBC # BLD AUTO: 3.85 X10(6)UL
SODIUM SERPL-SCNC: 139 MMOL/L (ref 136–145)
WBC # BLD AUTO: 5.8 X10(3) UL (ref 4–11)

## 2022-08-09 PROCEDURE — 3074F SYST BP LT 130 MM HG: CPT | Performed by: OBSTETRICS & GYNECOLOGY

## 2022-08-09 PROCEDURE — 59025 FETAL NON-STRESS TEST: CPT | Performed by: OBSTETRICS & GYNECOLOGY

## 2022-08-09 PROCEDURE — 99213 OFFICE O/P EST LOW 20 MIN: CPT | Performed by: OBSTETRICS & GYNECOLOGY

## 2022-08-09 PROCEDURE — 0502F SUBSEQUENT PRENATAL CARE: CPT | Performed by: OBSTETRICS & GYNECOLOGY

## 2022-08-09 PROCEDURE — 3078F DIAST BP <80 MM HG: CPT | Performed by: OBSTETRICS & GYNECOLOGY

## 2022-08-09 RX ORDER — BLOOD-GLUCOSE METER
1 EACH MISCELLANEOUS 4 TIMES DAILY
Qty: 1 KIT | Refills: 0 | Status: SHIPPED | OUTPATIENT
Start: 2022-08-09

## 2022-08-09 RX ORDER — BLOOD SUGAR DIAGNOSTIC
STRIP MISCELLANEOUS
Qty: 90 EACH | Refills: 3 | Status: SHIPPED | OUTPATIENT
Start: 2022-08-09

## 2022-08-09 NOTE — PROGRESS NOTES
Pt is a 33year old female admitted to TR3/TR3-A. Patient presents with:  Proteinuria: Sent from office for further evaluation     Pt is  32w2d intra-uterine pregnancy. History obtained, consents signed. Oriented to room, staff, and plan of care.

## 2022-08-09 NOTE — TRIAGE
[facility]      Triage Note    Real Obey Patient Status:  Outpatient    1990 MRN [id number]   Location [address] Attending Giovani Fleming MD   Hosp Day # 0 PCP None Pcp     [place] [facility] Para: [id number]  Estimated Date of Delivery: 10/2/22  Gestation: 32w2d    Chief Complaint     Proteinuria          Allergies:    Diclofenac              ANGIOEDEMA  Penicillins             SHORTNESS OF BREATH    Orders Placed This Encounter      Comp Metabolic Panel (14)      CBC With Differential With Platelet      Protein/Creatinine Ratio, Urine Random      Protein, 24-Hr Urine      Urine Culture, Routine      Lab Results   Component Value Date    WBC 5.8 2022    HGB 11.3 (L) 2022    HCT 35.2 2022    .0 2022    CREATSERUM 0.52 (L) 2022    BUN 7 2022     2022    K 3.6 2022     2022    CO2 25.0 2022    GLU 87 2022    CA 8.7 2022    ALB 2.5 (L) 2022    ALKPHO 109 (H) 2022    BILT 0.5 2022    TP 6.2 (L) 2022    AST 13 (L) 2022    ALT 13 2022    LIP 95 2022    DDIMER 0.85 (H) 2021    TROP <0.045 2021       Clinitek UA  Lab Results   Component Value Date    GLUUR Negative 2022    SPECGRAVITY 1.015 2022    URINECUL No Growth at 18-24 hrs. 2022       UA  Lab Results   Component Value Date    COLORUR Yellow 2022    CLARITY Hazy (A) 2022    SPECGRAVITY 1.015 2022    PROUR Negative 2022    GLUUR Negative 2022    KETUR Negative 2022    BILUR Negative 2022    BLOODURINE Negative 2022    NITRITE Negative 2022    UROBILINOGEN 4.0 (A) 2022    LEUUR Small (A) 2022    UASA Negative 2022  1130 22  1145 22  1200 22  1215   BP: 96/64 91/55 93/57 109/58   Pulse: 86 76 84 73       NST  Variability: Moderate           Accelerations: Yes           Decelerations: None            Baseline: 135 BPM           Uterine Irritability: No           Contractions: Not present                                                    Nonstress Test Interpretation: Reactive           Nonstress Test Second Interpretation: Reactive                        Additional Comments       Patient presents with:  Proteinuria: Sent from office for further evaluation  NST reactive with one deceleration noted. Labs and BP's WNL. Denied s/s PIH. Reported findings to Dr Magda Pettit. 24 hour urine for protein ordered.  used for discharge instructions and 24 hour urine collection. Whitney Simeon [id number]). Pt also c/o pain and burning with urination. Urine culture sent per Dr Magda Pettit ordered. Discharged to home. Josephine Reed RN  8/9/2022 12:45 PM    Physician Evaluation    NST Interpretation: Reactive  Fetal Surveillance: 120s to 130s BPM; Fetal heart variability: moderate  Fetal Heart Rate decelerations: none  Fetal Heart Rate accelerations: yes    Disposition:   Discharged    Comments:  A: 33 y.o.  Nat Halsted with Estimated Date of Delivery: 10/2/22 and IUP at [place] [id number] who was sent due to 1+ protein in the urine at her office visit. Labs were normal.  NST is reactive. Patient to do 24-hour urine collection and return when it is completed. We will contact patient with results.     Maddi Ness MD, MD  8/10/2022  3:03 PM

## 2022-08-09 NOTE — PROGRESS NOTES
Noncompliant patient. We will treat her as an A2 Diabetic until proven otherwise. To start weekly NSTs today and R/O Pre-Eclampsia due to elevated Protein on urine dip. Reports headaches for 1 week. Will authorize for new accucheck machine. To see Diabetic Educator for consultation. F/U with Log in 1 week.

## 2022-08-10 ENCOUNTER — LAB ENCOUNTER (OUTPATIENT)
Dept: LAB | Facility: HOSPITAL | Age: 32
End: 2022-08-10
Attending: OBSTETRICS & GYNECOLOGY
Payer: MEDICAID

## 2022-08-10 DIAGNOSIS — R80.9 PROTEINURIA: ICD-10-CM

## 2022-08-10 LAB
M PROTEIN 24H UR ELPH-MRATE: 129.8 MG/24 HR (ref ?–149.1)
SPECIMEN VOL UR: 1100 ML

## 2022-08-10 PROCEDURE — 84156 ASSAY OF PROTEIN URINE: CPT

## 2022-08-10 NOTE — TELEPHONE ENCOUNTER
Your case has been sent to Medical Review. Provider Name: DR. Haja Moore Contact: [facility]  Provider Address: [address] [facility] Phone Number: [phone]   Fax Number: [phone]  Patient Name: Yordan Prather Patient Id: [id number]  Insurance Carrier: [facility]  Site Name: [address] [place] [address] Site ID: [id number]  Site Address: [address] [place] [address]      Primary Diagnosis Code: O99.210 Description: Obesity complicating pregnancy, unspecified trimester  Secondary Diagnosis Code:  Description:   Date of Service: Not provided    CPT Code: 36607 Description: Ob us, follow-up, per fetus  Case Number: [id number]  Review Date: 8/10/2022 10:36:22 AM  Expiration Date: N/A  Status: Your case has been sent to Medical Review.

## 2022-08-12 ENCOUNTER — HOSPITAL ENCOUNTER (OUTPATIENT)
Dept: ENDOCRINOLOGY | Age: 32
Discharge: HOME | End: 2022-08-12
Attending: OBSTETRICS & GYNECOLOGY
Payer: MEDICAID

## 2022-08-12 DIAGNOSIS — O24.410 DIET CONTROLLED GESTATIONAL DIABETES MELLITUS (GDM) IN THIRD TRIMESTER: Primary | ICD-10-CM

## 2022-08-12 NOTE — PROGRESS NOTES
Kemar Frye  : 1990 was seen for GDM  Individual Follow-Up Counseling 1:1 Lebanese speaking    Date: 2022  Referring Provider: Dr Gunner Euceda time:  End time: 930    Assessment: LMP 2021 (Exact Date)   Weight: Wt Readings from Last 6 Encounters:  22 : 181 lb  22 : 181 lb  22 : 183 lb  22 : 184 lb 9.6 oz  22 : 183 lb  22 : 184 lb 6.4 oz      Blood Glucose: FBG: patient just started to check BG yesterday, and her fasting was 87, and 89  Ketones: discussed     Gestational Diabetes goals assessed by patient: 1 - never, barriers to attaining goals discussed and additional modifications to goals made     Diet:     Following meal plan: yes  Skips: no meals skipped  Meals are Balanced. Carb Intake is adequate. Protein Intake is adequate. Food Selections are healthy. Exercise:     walking     Education:     GDM Overview:  Reviewed target blood glucose values for GDM. Discussed benefits/risks to mother/baby management options to improve/maintain glucose control. Healthy Eating:  Reviewed/Reinforced:  Nutrition concepts for pregnancy/healthy eating and effect of food on blood glucose. Meal planning process and benefits of pre-planning meals/snacks. Appropriate timing of meals/snacks. Carb counting. Additional concepts: Increasing fiber    Being Active:  Reviewed benefits of effects of activity on BG values. Reviewed types of activity, duration, precautions. Monitoring:  Instructed to report readings to MD as directed. Call MD: if FBG is > 95 twice in 1 week. If 2 hr PP is > 120 twice in 1 week at any one meal.  Call Diabetic Educator is ketones are consistently elevated. Patient just started to check BG, stated she needed a new glucometer, and got one yesterday    Taking Medication:  Reviewed appropriate timing (if on insulin) of meds. Reviewed probability of needing medication adjustments throughout pregnancy.      Reducing Risk:  Discussed management of (hyperglycemia, hypoglycemia) and when to call provider. Recommendations:      1. Follow recommended meal plan. 2. Test blood glucose and ketones as directed. 3. Bring glucose log to each MD visit. 4. Start/continue activity if no restrictions. 5. Additional recommendations: continue to follow up with OB provider per protocl    Patient verbalized understanding and has no further questions at this time.     Fox Gtz RN, MSN

## 2022-08-14 PROBLEM — R79.0 LOW SERUM FERRITIN LEVEL: Status: ACTIVE | Noted: 2022-08-14

## 2022-08-14 PROBLEM — O99.019 ANTEPARTUM ANEMIA (HCC): Status: ACTIVE | Noted: 2022-08-14

## 2022-08-14 PROBLEM — O99.019 ANTEPARTUM ANEMIA: Status: ACTIVE | Noted: 2022-08-14

## 2022-08-15 ENCOUNTER — NST DOCUMENTATION (OUTPATIENT)
Dept: OBGYN CLINIC | Facility: CLINIC | Age: 32
End: 2022-08-15

## 2022-08-15 ENCOUNTER — APPOINTMENT (OUTPATIENT)
Dept: OBGYN CLINIC | Facility: HOSPITAL | Age: 32
End: 2022-08-15
Attending: OBSTETRICS & GYNECOLOGY
Payer: MEDICAID

## 2022-08-15 ENCOUNTER — HOSPITAL ENCOUNTER (OUTPATIENT)
Facility: HOSPITAL | Age: 32
Discharge: HOME | End: 2022-08-15
Attending: OBSTETRICS & GYNECOLOGY | Admitting: OBSTETRICS & GYNECOLOGY
Payer: MEDICAID

## 2022-08-15 VITALS — HEART RATE: 83 BPM | DIASTOLIC BLOOD PRESSURE: 61 MMHG | SYSTOLIC BLOOD PRESSURE: 116 MMHG

## 2022-08-15 DIAGNOSIS — O43.199 MARGINAL INSERTION OF UMBILICAL CORD AFFECTING MANAGEMENT OF MOTHER: ICD-10-CM

## 2022-08-15 DIAGNOSIS — O99.210 OBESITY AFFECTING PREGNANCY, ANTEPARTUM: ICD-10-CM

## 2022-08-15 DIAGNOSIS — O09.299 HX OF MACROSOMIA IN INFANT IN PRIOR PREGNANCY, CURRENTLY PREGNANT: ICD-10-CM

## 2022-08-15 PROCEDURE — 59025 FETAL NON-STRESS TEST: CPT | Performed by: OBSTETRICS & GYNECOLOGY

## 2022-08-15 PROCEDURE — 59025 FETAL NON-STRESS TEST: CPT

## 2022-08-15 NOTE — NST
Nonstress Test   Patient: Lisa Matthew    Gestation: 33w1d    Diagnosis from order:  maternal obesity        NST:         NST DOCUMENTATION 8/15/2022   Variability 6-25 BPM   Accelerations Yes   Decelerations None   Baseline 125   Uterine Irritability No   Contractions -   Acoustic Stimulator No   Nonstress Test Interpretation Reactive   Nonstress Test Second Interpretation -   FHR Category Category I   NST Completed by Kehinde Caruso RN   Disposition home    Testing Plan Weekly NST   Provider Notified Nadine Finnegan         I agree with the above evaluation. NST completed.   Fabi Marques MD  8/15/2022  2:38 PM

## 2022-08-22 ENCOUNTER — HOSPITAL ENCOUNTER (OUTPATIENT)
Facility: HOSPITAL | Age: 32
Discharge: HOME | End: 2022-08-22
Attending: OBSTETRICS & GYNECOLOGY | Admitting: OBSTETRICS & GYNECOLOGY
Payer: MEDICAID

## 2022-08-22 ENCOUNTER — NST DOCUMENTATION (OUTPATIENT)
Dept: OBGYN CLINIC | Facility: CLINIC | Age: 32
End: 2022-08-22

## 2022-08-22 ENCOUNTER — APPOINTMENT (OUTPATIENT)
Dept: OBGYN CLINIC | Facility: HOSPITAL | Age: 32
End: 2022-08-22
Payer: MEDICAID

## 2022-08-22 DIAGNOSIS — O99.210 OBESITY AFFECTING PREGNANCY, ANTEPARTUM: ICD-10-CM

## 2022-08-22 PROCEDURE — 59025 FETAL NON-STRESS TEST: CPT

## 2022-08-22 PROCEDURE — 59025 FETAL NON-STRESS TEST: CPT | Performed by: OBSTETRICS & GYNECOLOGY

## 2022-08-22 NOTE — NST
Nonstress Test   Patient: Maria Luisa Zhang    Gestation: 34w1d    Diagnosis from order: Inpatient order, no diagnosis associated       NST:         NST DOCUMENTATION 2022   Variability 6-25 BPM   Accelerations Yes   Decelerations None   Baseline 125   Uterine Irritability No   Contractions Not present   Acoustic Stimulator -   Nonstress Test Interpretation Reactive   Nonstress Test Second Interpretation Reactive   FHR Category Category I   Comments NST for high BMI and marginal cord insertion.  34 . NST reactive. Small decel resolved with positions change, good variability and accels. MD reviewed strip. [id number] Shazia Rogers for d/c   NST Completed by Nikolay Garcia RN   Disposition Home    Testing Plan Weekly NST   Provider Notified Kennedy Pierce         I agree with the above evaluation. NST completed. Alan Pierce MD  2022  11:52 AM

## 2022-08-29 ENCOUNTER — APPOINTMENT (OUTPATIENT)
Dept: OBGYN CLINIC | Facility: HOSPITAL | Age: 32
End: 2022-08-29
Payer: MEDICAID

## 2022-08-29 ENCOUNTER — HOSPITAL ENCOUNTER (OUTPATIENT)
Facility: HOSPITAL | Age: 32
Discharge: HOME | End: 2022-08-29
Attending: OBSTETRICS & GYNECOLOGY | Admitting: OBSTETRICS & GYNECOLOGY
Payer: MEDICAID

## 2022-08-29 ENCOUNTER — NST DOCUMENTATION (OUTPATIENT)
Dept: OBGYN CLINIC | Facility: CLINIC | Age: 32
End: 2022-08-29

## 2022-08-29 VITALS — SYSTOLIC BLOOD PRESSURE: 109 MMHG | DIASTOLIC BLOOD PRESSURE: 62 MMHG | HEART RATE: 81 BPM

## 2022-08-29 DIAGNOSIS — O12.10 PROTEINURIA IN PREGNANCY, ANTEPARTUM: ICD-10-CM

## 2022-08-29 DIAGNOSIS — O99.210 OBESITY AFFECTING PREGNANCY, ANTEPARTUM: ICD-10-CM

## 2022-08-29 DIAGNOSIS — O43.199 MARGINAL INSERTION OF UMBILICAL CORD AFFECTING MANAGEMENT OF MOTHER: ICD-10-CM

## 2022-08-29 PROCEDURE — 59025 FETAL NON-STRESS TEST: CPT | Performed by: OBSTETRICS & GYNECOLOGY

## 2022-08-29 PROCEDURE — 59025 FETAL NON-STRESS TEST: CPT

## 2022-08-29 NOTE — NST
Nonstress Test   Patient: Laurie Morgan    Gestation: 35w1d    Diagnosis from order:    Maternal obesity      NST:         NST DOCUMENTATION 2022   Variability 6-25 BPM   Accelerations Yes   Decelerations None   Baseline 130   Uterine Irritability No   Contractions Not present   Acoustic Stimulator No   Nonstress Test Interpretation Reactive   Nonstress Test Second Interpretation -   FHR Category Category I   Comments -   NST Completed by Alecia Coleman RNC   Disposition home    Testing Plan Weekly NST   Provider Notified Dr. Jose Gaffney         I agree with the above evaluation. NST completed.   Colton Gentile MD  2022  2:41 PM

## 2022-08-30 ENCOUNTER — ROUTINE PRENATAL (OUTPATIENT)
Dept: OBGYN CLINIC | Facility: CLINIC | Age: 32
End: 2022-08-30
Payer: MEDICAID

## 2022-08-30 ENCOUNTER — TELEPHONE (OUTPATIENT)
Dept: OBGYN CLINIC | Facility: CLINIC | Age: 32
End: 2022-08-30

## 2022-08-30 VITALS — WEIGHT: 187.38 LBS | SYSTOLIC BLOOD PRESSURE: 108 MMHG | BODY MASS INDEX: 32 KG/M2 | DIASTOLIC BLOOD PRESSURE: 82 MMHG

## 2022-08-30 DIAGNOSIS — R82.998 LEUKOCYTES IN URINE: ICD-10-CM

## 2022-08-30 DIAGNOSIS — Z34.83 ENCOUNTER FOR SUPERVISION OF OTHER NORMAL PREGNANCY IN THIRD TRIMESTER: Primary | ICD-10-CM

## 2022-08-30 LAB
BILIRUBIN: NEGATIVE
GLUCOSE (URINE DIPSTICK): NEGATIVE MG/DL
KETONES (URINE DIPSTICK): NEGATIVE MG/DL
MULTISTIX LOT#: ABNORMAL NUMERIC
NITRITE, URINE: NEGATIVE
OCCULT BLOOD: NEGATIVE
PH, URINE: 6.5 (ref 4.5–8)
SPECIFIC GRAVITY: 1.02 (ref 1–1.03)
URINE-COLOR: YELLOW
UROBILINOGEN,SEMI-QN: 0.2 MG/DL (ref 0–1.9)

## 2022-08-30 PROCEDURE — 87086 URINE CULTURE/COLONY COUNT: CPT | Performed by: OBSTETRICS & GYNECOLOGY

## 2022-09-05 ENCOUNTER — APPOINTMENT (OUTPATIENT)
Dept: OBGYN CLINIC | Facility: HOSPITAL | Age: 32
End: 2022-09-05
Payer: MEDICAID

## 2022-09-05 ENCOUNTER — NST DOCUMENTATION (OUTPATIENT)
Dept: OBGYN CLINIC | Facility: CLINIC | Age: 32
End: 2022-09-05

## 2022-09-05 ENCOUNTER — HOSPITAL ENCOUNTER (OUTPATIENT)
Facility: HOSPITAL | Age: 32
Discharge: HOME | End: 2022-09-05
Attending: OBSTETRICS & GYNECOLOGY | Admitting: OBSTETRICS & GYNECOLOGY
Payer: MEDICAID

## 2022-09-05 VITALS
TEMPERATURE: 98 F | RESPIRATION RATE: 16 BRPM | HEART RATE: 97 BPM | DIASTOLIC BLOOD PRESSURE: 59 MMHG | SYSTOLIC BLOOD PRESSURE: 103 MMHG

## 2022-09-05 DIAGNOSIS — O99.210 OBESITY AFFECTING PREGNANCY, ANTEPARTUM: ICD-10-CM

## 2022-09-05 DIAGNOSIS — O43.199 MARGINAL INSERTION OF UMBILICAL CORD AFFECTING MANAGEMENT OF MOTHER: ICD-10-CM

## 2022-09-05 PROCEDURE — 59025 FETAL NON-STRESS TEST: CPT

## 2022-09-05 NOTE — NST
Nonstress Test   Patient: Ham Vigil    Gestation: 80L1E    Diagnosis from order:         NST:         NST DOCUMENTATION 2022   Variability 6-25 BPM   Accelerations Yes   Decelerations None   Baseline 130   Uterine Irritability No   Contractions Not present   Acoustic Stimulator No   Nonstress Test Interpretation Reactive   Nonstress Test Second Interpretation -   FHR Category Category I   Comments NST for high bmi, marginal cord insertion, A1GDM, , nst reactive, henny magallanes notified, discharged home, kick counts reviewed, weekly nsts   NST Completed by jyothi stack rn   Disposition home    Testing Plan Weekly NST   Provider Notified henny magallanes         Physician Evaluation      NST Interpretation: Reactive  Fetal Surveillance: 130s BPM; Fetal heart variability: moderate  Fetal Heart Rate decelerations: none  Fetal Heart Rate accelerations: yes    Disposition:   Discharged    Comments:  A: 33 y.o.  [id number] with Estimated Date of Delivery: 10/2/22 and IUP at 36w1d here for NST due to elevated maternal BMI and marginal cord insertion. NST is reactive.     Yoel Fisher MD, MD  2022  4:15 PM

## 2022-09-05 NOTE — PROGRESS NOTES
Pt is a 33year old female admitted to TR1/TR1-A. Patient presents with:  Non-stress Test: High BMI and marginal cord insertion, A1GDM     Pt is  36w1d intra-uterine pregnancy. History obtained, consents signed. Oriented to room, staff, and plan of care.

## 2022-09-05 NOTE — PROGRESS NOTES
Pt is a 33year old female admitted to TR1/TR1-A. Patient presents with:  Non-stress Test: bmi      Pt is  36w1d intra-uterine pregnancy. History obtained, consents signed. Oriented to room, staff, and plan of care.

## 2022-09-06 ENCOUNTER — HOSPITAL ENCOUNTER (OUTPATIENT)
Dept: PERINATAL CARE | Facility: HOSPITAL | Age: 32
Discharge: HOME | End: 2022-09-06
Attending: OBSTETRICS & GYNECOLOGY
Payer: MEDICAID

## 2022-09-06 VITALS
BODY MASS INDEX: 32 KG/M2 | SYSTOLIC BLOOD PRESSURE: 98 MMHG | HEART RATE: 73 BPM | WEIGHT: 187 LBS | DIASTOLIC BLOOD PRESSURE: 63 MMHG

## 2022-09-06 DIAGNOSIS — O09.299 HX OF MACROSOMIA IN INFANT IN PRIOR PREGNANCY, CURRENTLY PREGNANT: ICD-10-CM

## 2022-09-06 DIAGNOSIS — O12.10 PROTEINURIA IN PREGNANCY, ANTEPARTUM: ICD-10-CM

## 2022-09-06 DIAGNOSIS — O43.199 MARGINAL INSERTION OF UMBILICAL CORD AFFECTING MANAGEMENT OF MOTHER: ICD-10-CM

## 2022-09-06 DIAGNOSIS — O99.210 OBESITY AFFECTING PREGNANCY, ANTEPARTUM: ICD-10-CM

## 2022-09-06 DIAGNOSIS — O99.210 OBESITY AFFECTING PREGNANCY, ANTEPARTUM: Primary | ICD-10-CM

## 2022-09-06 DIAGNOSIS — O24.410 DIET CONTROLLED GESTATIONAL DIABETES MELLITUS (GDM) IN THIRD TRIMESTER: ICD-10-CM

## 2022-09-06 PROCEDURE — 76819 FETAL BIOPHYS PROFIL W/O NST: CPT

## 2022-09-06 PROCEDURE — 76816 OB US FOLLOW-UP PER FETUS: CPT | Performed by: OBSTETRICS & GYNECOLOGY

## 2022-09-12 ENCOUNTER — NST DOCUMENTATION (OUTPATIENT)
Dept: OBGYN CLINIC | Facility: CLINIC | Age: 32
End: 2022-09-12

## 2022-09-12 ENCOUNTER — HOSPITAL ENCOUNTER (OUTPATIENT)
Facility: HOSPITAL | Age: 32
Discharge: HOME | End: 2022-09-12
Attending: OBSTETRICS & GYNECOLOGY | Admitting: OBSTETRICS & GYNECOLOGY
Payer: MEDICAID

## 2022-09-12 ENCOUNTER — APPOINTMENT (OUTPATIENT)
Dept: OBGYN CLINIC | Facility: HOSPITAL | Age: 32
End: 2022-09-12
Payer: MEDICAID

## 2022-09-12 VITALS — SYSTOLIC BLOOD PRESSURE: 113 MMHG | HEART RATE: 89 BPM | DIASTOLIC BLOOD PRESSURE: 60 MMHG

## 2022-09-12 DIAGNOSIS — O24.410 DIET CONTROLLED GESTATIONAL DIABETES MELLITUS (GDM) IN THIRD TRIMESTER: ICD-10-CM

## 2022-09-12 DIAGNOSIS — O99.210 OBESITY AFFECTING PREGNANCY, ANTEPARTUM: ICD-10-CM

## 2022-09-12 PROCEDURE — 59025 FETAL NON-STRESS TEST: CPT | Performed by: OBSTETRICS & GYNECOLOGY

## 2022-09-12 PROCEDURE — 59025 FETAL NON-STRESS TEST: CPT

## 2022-09-12 NOTE — NST
Nonstress Test   Patient: Lima Oneillh    Gestation: 52G6N    Diagnosis from order: Inpatient order, no diagnosis associated GDM and Obesity        NST:         NST DOCUMENTATION 2022   Variability 6-25 BPM   Accelerations Yes   Decelerations None   Baseline 125   Uterine Irritability No   Contractions Irregular   Acoustic Stimulator No   Nonstress Test Interpretation Reactive   Nonstress Test Second Interpretation Reactive   FHR Category -   Comments -   NST Completed by Cb Verde RN   Disposition home    Testing Plan Weekly NST   Provider Notified Maddi Mckeon I agree with the above evaluation. NST completed.   Brenda Wheeler MD  2022  12:02 PM

## 2022-09-13 ENCOUNTER — HOSPITAL ENCOUNTER (OUTPATIENT)
Dept: PERINATAL CARE | Facility: HOSPITAL | Age: 32
Discharge: HOME | End: 2022-09-13
Attending: OBSTETRICS & GYNECOLOGY
Payer: MEDICAID

## 2022-09-13 ENCOUNTER — ROUTINE PRENATAL (OUTPATIENT)
Dept: OBGYN CLINIC | Facility: CLINIC | Age: 32
End: 2022-09-13
Payer: MEDICAID

## 2022-09-13 ENCOUNTER — TELEPHONE (OUTPATIENT)
Dept: OBGYN CLINIC | Facility: CLINIC | Age: 32
End: 2022-09-13

## 2022-09-13 VITALS — DIASTOLIC BLOOD PRESSURE: 59 MMHG | SYSTOLIC BLOOD PRESSURE: 116 MMHG | HEART RATE: 88 BPM

## 2022-09-13 VITALS
DIASTOLIC BLOOD PRESSURE: 69 MMHG | BODY MASS INDEX: 33 KG/M2 | SYSTOLIC BLOOD PRESSURE: 114 MMHG | HEART RATE: 88 BPM | WEIGHT: 190 LBS

## 2022-09-13 DIAGNOSIS — O24.410 DIET CONTROLLED GESTATIONAL DIABETES MELLITUS (GDM) IN THIRD TRIMESTER: Primary | ICD-10-CM

## 2022-09-13 DIAGNOSIS — O99.210 OBESITY AFFECTING PREGNANCY, ANTEPARTUM: ICD-10-CM

## 2022-09-13 DIAGNOSIS — Z34.83 ENCOUNTER FOR SUPERVISION OF OTHER NORMAL PREGNANCY IN THIRD TRIMESTER: ICD-10-CM

## 2022-09-13 DIAGNOSIS — O43.199 MARGINAL INSERTION OF UMBILICAL CORD AFFECTING MANAGEMENT OF MOTHER: ICD-10-CM

## 2022-09-13 LAB
APPEARANCE: CLEAR
BILIRUBIN: NEGATIVE
GLUCOSE (URINE DIPSTICK): NEGATIVE MG/DL
KETONES (URINE DIPSTICK): NEGATIVE MG/DL
MULTISTIX LOT#: ABNORMAL NUMERIC
NITRITE, URINE: NEGATIVE
OCCULT BLOOD: NEGATIVE
PH, URINE: 6.5 (ref 4.5–8)
SPECIFIC GRAVITY: 1.02 (ref 1–1.03)
URINE-COLOR: YELLOW
UROBILINOGEN,SEMI-QN: 0.2 MG/DL (ref 0–1.9)

## 2022-09-13 PROCEDURE — 81002 URINALYSIS NONAUTO W/O SCOPE: CPT | Performed by: OBSTETRICS & GYNECOLOGY

## 2022-09-13 PROCEDURE — 0502F SUBSEQUENT PRENATAL CARE: CPT | Performed by: OBSTETRICS & GYNECOLOGY

## 2022-09-13 PROCEDURE — 59025 FETAL NON-STRESS TEST: CPT

## 2022-09-13 PROCEDURE — 3078F DIAST BP <80 MM HG: CPT | Performed by: OBSTETRICS & GYNECOLOGY

## 2022-09-13 PROCEDURE — 3074F SYST BP LT 130 MM HG: CPT | Performed by: OBSTETRICS & GYNECOLOGY

## 2022-09-13 NOTE — TELEPHONE ENCOUNTER
[facility] called and scheduled for her IOL on at 7 am on 09/26/22. Kosovan phone line  #[phone] used to translate phone call. Pt informed of time and date of IOL. Pt voices understanding. Pt placed in induction book.

## 2022-09-15 LAB — GROUP B STREP BY PCR FOR PCR OVT: NEGATIVE

## 2022-09-17 ENCOUNTER — LAB ENCOUNTER (OUTPATIENT)
Dept: LAB | Age: 32
End: 2022-09-17
Attending: OBSTETRICS & GYNECOLOGY

## 2022-09-17 DIAGNOSIS — Z34.83 ENCOUNTER FOR SUPERVISION OF OTHER NORMAL PREGNANCY IN THIRD TRIMESTER: ICD-10-CM

## 2022-09-17 LAB
BASOPHILS # BLD AUTO: 0.03 X10(3) UL (ref 0–0.2)
BASOPHILS NFR BLD AUTO: 0.6 %
DEPRECATED RDW RBC AUTO: 48.4 FL (ref 35.1–46.3)
EOSINOPHIL # BLD AUTO: 0.05 X10(3) UL (ref 0–0.7)
EOSINOPHIL NFR BLD AUTO: 0.9 %
ERYTHROCYTE [DISTWIDTH] IN BLOOD BY AUTOMATED COUNT: 14.4 % (ref 11–15)
HCT VFR BLD AUTO: 36.3 %
HGB BLD-MCNC: 11.3 G/DL
IMM GRANULOCYTES # BLD AUTO: 0.05 X10(3) UL (ref 0–1)
IMM GRANULOCYTES NFR BLD: 0.9 %
LYMPHOCYTES # BLD AUTO: 1.01 X10(3) UL (ref 1–4)
LYMPHOCYTES NFR BLD AUTO: 18.8 %
MCH RBC QN AUTO: 28.8 PG (ref 26–34)
MCHC RBC AUTO-ENTMCNC: 31.1 G/DL (ref 31–37)
MCV RBC AUTO: 92.4 FL
MONOCYTES # BLD AUTO: 0.46 X10(3) UL (ref 0.1–1)
MONOCYTES NFR BLD AUTO: 8.6 %
NEUTROPHILS # BLD AUTO: 3.78 X10 (3) UL (ref 1.5–7.7)
NEUTROPHILS # BLD AUTO: 3.78 X10(3) UL (ref 1.5–7.7)
NEUTROPHILS NFR BLD AUTO: 70.2 %
PLATELET # BLD AUTO: 152 10(3)UL (ref 150–450)
RBC # BLD AUTO: 3.93 X10(6)UL
WBC # BLD AUTO: 5.4 X10(3) UL (ref 4–11)

## 2022-09-17 PROCEDURE — 86780 TREPONEMA PALLIDUM: CPT

## 2022-09-17 PROCEDURE — 36415 COLL VENOUS BLD VENIPUNCTURE: CPT

## 2022-09-17 PROCEDURE — 85025 COMPLETE CBC W/AUTO DIFF WBC: CPT

## 2022-09-17 PROCEDURE — 87389 HIV-1 AG W/HIV-1&-2 AB AG IA: CPT

## 2022-09-19 ENCOUNTER — HOSPITAL ENCOUNTER (OUTPATIENT)
Facility: HOSPITAL | Age: 32
Discharge: HOME | End: 2022-09-19
Attending: OBSTETRICS & GYNECOLOGY | Admitting: OBSTETRICS & GYNECOLOGY

## 2022-09-19 ENCOUNTER — TELEPHONE (OUTPATIENT)
Dept: OBGYN CLINIC | Facility: CLINIC | Age: 32
End: 2022-09-19

## 2022-09-19 ENCOUNTER — APPOINTMENT (OUTPATIENT)
Dept: OBGYN CLINIC | Facility: HOSPITAL | Age: 32
End: 2022-09-19
Attending: OBSTETRICS & GYNECOLOGY

## 2022-09-19 LAB — T PALLIDUM AB SER QL: NEGATIVE

## 2022-09-19 PROCEDURE — 99213 OFFICE O/P EST LOW 20 MIN: CPT

## 2022-09-19 PROCEDURE — 59025 FETAL NON-STRESS TEST: CPT

## 2022-09-19 NOTE — TELEPHONE ENCOUNTER
Maltese phone line  #[phone] used to translate phone call. Pt having contractions every 1-2 hours that are lasting 15-30 seconds since yesterday. No bloody show or ROM. Positive fetal movement. Pt having a lot of vaginal pressure. Pt has NST with the [facility] today at 10 am. Advised pt to go to the [facility] for evaluation. Pt voices understanding. Report given to the [facility] Triage RN .

## 2022-09-19 NOTE — PROGRESS NOTES
Pt is a 33year old female admitted to TR1/TR1-A. Patient presents with:  Non-stress Test: A1GDM and obesity  R/o Labor: contractions for the last 2 hours, lasting 20-30 seconds, unable to describe how often. Pt states that her contractions are in her lower abd and back. +FM, denies LOF. Pt is  38w1d intra-uterine pregnancy. History obtained, consents signed. Oriented to room, staff, and plan of care.

## 2022-09-19 NOTE — TELEPHONE ENCOUNTER
Pt Name and  verified. Pt is currently at [facility] being monitored. Advised pt that she is in the appropriate location. Pt states that she feels a lot of pressure and just wants to make sure that the nurses there are aware. This RN spoke to Triage Latha De SouzaKimani and informed of pt's symptoms. Lul Fuentes states that pt is being monitored. This RN spoke to pt and informed that she needs to see how often she gets her contractions and inform the nurses of any significant symptoms. Pt VU and had no other questions. Partially impaired: cannot see medication labels or newsprint, but can see obstacles in path, and the surrounding layout; can count fingers at arm's length

## 2022-09-19 NOTE — TRIAGE
[facility] HOSP - [facility]      Triage Note    Rick Hernandez Patient Status:  Outpatient    1990 MRN [id number]   Location [address] Attending Robyn Lomeli MD   [facility] Day # 0 PCP None Pcp     Bridget Milligan: [id number]  Estimated Date of Delivery: 10/2/22  Gestation: 38w1d    Chief Complaint     Non-stress Test; R/o Labor          Allergies:    Diclofenac              ANGIOEDEMA  Penicillins             SHORTNESS OF BREATH    No orders of the defined types were placed in this encounter. Lab Results   Component Value Date    WBC 5.4 2022    HGB 11.3 (L) 2022    HCT 36.3 2022    .0 2022    CREATSERUM 0.52 (L) 2022    BUN 7 2022     2022    K 3.6 2022     2022    CO2 25.0 2022    GLU 87 2022    CA 8.7 2022    ALB 2.5 (L) 2022    ALKPHO 109 (H) 2022    BILT 0.5 2022    TP 6.2 (L) 2022    AST 13 (L) 2022    ALT 13 2022    LIP 95 2022    DDIMER 0.85 (H) 2021    TROP <0.045 2021       Clinitek UA  Lab Results   Component Value Date    GLUUR Negative 2022    SPECGRAVITY 1.020 2022    URINECUL  2022     <10,000 cfu/ml Mixture of Gram positive organisms isolated - probable contamination. UA  Lab Results   Component Value Date    COLORUR Yellow 2022    CLARITY Hazy (A) 2022    SPECGRAVITY 1.020 2022    PROUR Negative 2022    GLUUR Negative 2022    KETUR Negative 2022    BILUR Negative 2022    BLOODURINE Negative 2022    NITRITE Negative 2022    UROBILINOGEN 4.0 (A) 2022    LEUUR Small (A) 2022    UASA Negative 2022       There were no vitals filed for this visit.     NST  Variability: Moderate           Accelerations: Yes           Decelerations: None            Baseline: 125 BPM           Uterine Irritability: Yes           Contractions: Irregular                                        Acoustic Stimulator: No           Nonstress Test Interpretation: Reactive           Nonstress Test Second Interpretation: Reactive          FHR Category: Category I             Additional Comments Comments: No cervical change after ambulating. Pt has NST scheduled for tomorrow. Pt given discharge instructions on s/s of labor and kick counts. Pt verbalizes understanding.  used with language line. Patient presents with:  Non-stress Test: A1GDM and obesity  R/o Labor: contractions for the last 2 hours, lasting 20-30 seconds, unable to describe how often. Pt states that her contractions are in her lower abd and back. +FM, denies LOF.         Yoon Desir RN  9/19/2022 1:11 PM

## 2022-09-19 NOTE — TELEPHONE ENCOUNTER
Patient has been having contractions since yesterday. They are lasting a few seconds and happening every couple of hours. Please advise.

## 2022-09-20 ENCOUNTER — NST DOCUMENTATION (OUTPATIENT)
Dept: OBGYN CLINIC | Facility: CLINIC | Age: 32
End: 2022-09-20

## 2022-09-20 ENCOUNTER — HOSPITAL ENCOUNTER (OUTPATIENT)
Dept: PERINATAL CARE | Facility: HOSPITAL | Age: 32
Discharge: HOME | End: 2022-09-20
Attending: OBSTETRICS & GYNECOLOGY

## 2022-09-20 DIAGNOSIS — O24.410 DIET CONTROLLED GESTATIONAL DIABETES MELLITUS (GDM) IN THIRD TRIMESTER: ICD-10-CM

## 2022-09-20 PROCEDURE — 59025 FETAL NON-STRESS TEST: CPT | Performed by: OBSTETRICS & GYNECOLOGY

## 2022-09-20 NOTE — NST
Nonstress Test   Patient: Conor Hastings    Gestation: 30A6K    Diagnosis from order:         NST:         NST DOCUMENTATION 2022   Variability 6-25 BPM   Accelerations Yes   Decelerations None   Baseline 120   Uterine Irritability No   Contractions Not present   Acoustic Stimulator No   Nonstress Test Interpretation Reactive   Nonstress Test Second Interpretation -   FHR Category Category I   Comments -   NST Completed by Andreea Guardado RN   Disposition home    Testing Plan Weekly NST   Provider Notified Eric Young         I agree with the above evaluation. NST completed. Alan Young MD  2022  3:19 PM

## 2022-09-21 ENCOUNTER — TELEPHONE (OUTPATIENT)
Dept: OBGYN CLINIC | Facility: CLINIC | Age: 32
End: 2022-09-21

## 2022-09-21 NOTE — TELEPHONE ENCOUNTER
Patient seen in clinic on 9/13 and asking when she needs to be seen again or plan of care. Please call with  at [phone],TREASURE.

## 2022-09-22 ENCOUNTER — ROUTINE PRENATAL (OUTPATIENT)
Dept: OBGYN CLINIC | Facility: CLINIC | Age: 32
End: 2022-09-22

## 2022-09-22 VITALS — BODY MASS INDEX: 33 KG/M2 | DIASTOLIC BLOOD PRESSURE: 70 MMHG | WEIGHT: 195 LBS | SYSTOLIC BLOOD PRESSURE: 108 MMHG

## 2022-09-22 DIAGNOSIS — Z78.9 TELEPHONE LANGUAGE INTERPRETER SERVICE REQUIRED: ICD-10-CM

## 2022-09-22 DIAGNOSIS — Z34.83 ENCOUNTER FOR SUPERVISION OF OTHER NORMAL PREGNANCY IN THIRD TRIMESTER: Primary | ICD-10-CM

## 2022-09-22 LAB
APPEARANCE: CLEAR
APPEARANCE: CLEAR
BILIRUBIN: NEGATIVE
BILIRUBIN: NEGATIVE
GLUCOSE (URINE DIPSTICK): NEGATIVE MG/DL
GLUCOSE (URINE DIPSTICK): NEGATIVE MG/DL
KETONES (URINE DIPSTICK): NEGATIVE MG/DL
KETONES (URINE DIPSTICK): NEGATIVE MG/DL
MULTISTIX LOT#: ABNORMAL NUMERIC
MULTISTIX LOT#: ABNORMAL NUMERIC
NITRITE, URINE: NEGATIVE
NITRITE, URINE: NEGATIVE
PH, URINE: 7 (ref 4.5–8)
PH, URINE: 7 (ref 4.5–8)
PROTEIN (URINE DIPSTICK): 30 MG/DL
SPECIFIC GRAVITY: 1.02 (ref 1–1.03)
SPECIFIC GRAVITY: 1.02 (ref 1–1.03)
URINE-COLOR: YELLOW
URINE-COLOR: YELLOW
UROBILINOGEN,SEMI-QN: 0.2 MG/DL (ref 0–1.9)
UROBILINOGEN,SEMI-QN: 0.2 MG/DL (ref 0–1.9)

## 2022-09-22 PROCEDURE — 81002 URINALYSIS NONAUTO W/O SCOPE: CPT | Performed by: NURSE PRACTITIONER

## 2022-09-22 PROCEDURE — 0502F SUBSEQUENT PRENATAL CARE: CPT | Performed by: NURSE PRACTITIONER

## 2022-09-22 PROCEDURE — 3078F DIAST BP <80 MM HG: CPT | Performed by: NURSE PRACTITIONER

## 2022-09-22 PROCEDURE — 3074F SYST BP LT 130 MM HG: CPT | Performed by: NURSE PRACTITIONER

## 2022-09-22 NOTE — PROGRESS NOTES
ID# Chris Gone [id number]    Reviewed induction instructions. Feels good fetal movement. 2-3 times a day she will have tightening that resolves. She denies vaginal bleeding and leaking of fluid. Trace protein in urine, denies headache, visual disturbances, and facial swelling. Does had some lower extremity edema. NBP WNL. Labor precautions reviewed, verbalized understanding.

## 2022-09-22 NOTE — PROGRESS NOTES
ID# Jaskaran Clayton [id number]    Reviewed induction instruction. Feels good fetal movement. 2-3 times a day she will have tightening that resolves. She denies vaginal bleeding and leaking of fluid.  Protein in urine redipped,

## 2022-09-23 ENCOUNTER — HOSPITAL ENCOUNTER (INPATIENT)
Facility: HOSPITAL | Age: 32
LOS: 2 days | Discharge: HOME | End: 2022-09-25
Attending: OBSTETRICS & GYNECOLOGY | Admitting: OBSTETRICS & GYNECOLOGY
Payer: MEDICAID

## 2022-09-23 PROBLEM — O24.410 DIET CONTROLLED WHITE CLASSIFICATION A1 GESTATIONAL DIABETES MELLITUS (GDM) (HCC): Status: ACTIVE | Noted: 2022-09-06

## 2022-09-23 PROBLEM — O24.410 DIET CONTROLLED WHITE CLASSIFICATION A1 GESTATIONAL DIABETES MELLITUS (GDM): Status: ACTIVE | Noted: 2022-09-06

## 2022-09-23 PROBLEM — Z34.90 PREGNANCY: Status: ACTIVE | Noted: 2022-09-23

## 2022-09-23 PROBLEM — Z34.90 PREGNANCY (HCC): Status: ACTIVE | Noted: 2022-09-23

## 2022-09-23 LAB
ANTIBODY SCREEN: NEGATIVE
BASOPHILS # BLD AUTO: 0.02 X10(3) UL (ref 0–0.2)
BASOPHILS NFR BLD AUTO: 0.2 %
DEPRECATED RDW RBC AUTO: 46.2 FL (ref 35.1–46.3)
EOSINOPHIL # BLD AUTO: 0.02 X10(3) UL (ref 0–0.7)
EOSINOPHIL NFR BLD AUTO: 0.2 %
ERYTHROCYTE [DISTWIDTH] IN BLOOD BY AUTOMATED COUNT: 14.4 % (ref 11–15)
HCT VFR BLD AUTO: 40.6 %
HGB BLD-MCNC: 13.1 G/DL
IMM GRANULOCYTES # BLD AUTO: 0.06 X10(3) UL (ref 0–1)
IMM GRANULOCYTES NFR BLD: 0.6 %
LYMPHOCYTES # BLD AUTO: 0.94 X10(3) UL (ref 1–4)
LYMPHOCYTES NFR BLD AUTO: 9.6 %
MCH RBC QN AUTO: 28.7 PG (ref 26–34)
MCHC RBC AUTO-ENTMCNC: 32.3 G/DL (ref 31–37)
MCV RBC AUTO: 88.8 FL
MONOCYTES # BLD AUTO: 0.59 X10(3) UL (ref 0.1–1)
MONOCYTES NFR BLD AUTO: 6 %
NEUTROPHILS # BLD AUTO: 8.16 X10 (3) UL (ref 1.5–7.7)
NEUTROPHILS # BLD AUTO: 8.16 X10(3) UL (ref 1.5–7.7)
NEUTROPHILS NFR BLD AUTO: 83.4 %
PLATELET # BLD AUTO: 147 10(3)UL (ref 150–450)
RBC # BLD AUTO: 4.57 X10(6)UL
RH BLOOD TYPE: POSITIVE
SARS-COV-2 RNA RESP QL NAA+PROBE: NOT DETECTED
WBC # BLD AUTO: 9.8 X10(3) UL (ref 4–11)

## 2022-09-23 PROCEDURE — 59409 OBSTETRICAL CARE: CPT | Performed by: OBSTETRICS & GYNECOLOGY

## 2022-09-23 RX ORDER — LIDOCAINE HYDROCHLORIDE 10 MG/ML
30 INJECTION, SOLUTION EPIDURAL; INFILTRATION; INTRACAUDAL; PERINEURAL ONCE
Status: DISCONTINUED | OUTPATIENT
Start: 2022-09-23 | End: 2022-09-23 | Stop reason: HOSPADM

## 2022-09-23 RX ORDER — ACETAMINOPHEN 500 MG
1000 TABLET ORAL EVERY 6 HOURS PRN
Status: DISCONTINUED | OUTPATIENT
Start: 2022-09-23 | End: 2022-09-25

## 2022-09-23 RX ORDER — DOCUSATE SODIUM 100 MG/1
100 CAPSULE, LIQUID FILLED ORAL
Status: DISCONTINUED | OUTPATIENT
Start: 2022-09-23 | End: 2022-09-25

## 2022-09-23 RX ORDER — MISOPROSTOL 200 UG/1
TABLET ORAL
Status: COMPLETED
Start: 2022-09-23 | End: 2022-09-23

## 2022-09-23 RX ORDER — ONDANSETRON 2 MG/ML
4 INJECTION INTRAMUSCULAR; INTRAVENOUS EVERY 6 HOURS PRN
Status: DISCONTINUED | OUTPATIENT
Start: 2022-09-23 | End: 2022-09-25

## 2022-09-23 RX ORDER — AMMONIA INHALANTS 0.04 G/.3ML
0.3 INHALANT RESPIRATORY (INHALATION) AS NEEDED
Status: DISCONTINUED | OUTPATIENT
Start: 2022-09-23 | End: 2022-09-23 | Stop reason: HOSPADM

## 2022-09-23 RX ORDER — SIMETHICONE 80 MG
80 TABLET,CHEWABLE ORAL 3 TIMES DAILY PRN
Status: DISCONTINUED | OUTPATIENT
Start: 2022-09-23 | End: 2022-09-25

## 2022-09-23 RX ORDER — ACETAMINOPHEN 500 MG
500 TABLET ORAL EVERY 6 HOURS PRN
Status: DISCONTINUED | OUTPATIENT
Start: 2022-09-23 | End: 2022-09-25

## 2022-09-23 RX ORDER — ACETAMINOPHEN 500 MG
500 TABLET ORAL EVERY 6 HOURS PRN
Status: DISCONTINUED | OUTPATIENT
Start: 2022-09-23 | End: 2022-09-23

## 2022-09-23 RX ORDER — AMMONIA INHALANTS 0.04 G/.3ML
0.3 INHALANT RESPIRATORY (INHALATION) AS NEEDED
Status: DISCONTINUED | OUTPATIENT
Start: 2022-09-23 | End: 2022-09-25

## 2022-09-23 RX ORDER — DEXTROSE, SODIUM CHLORIDE, SODIUM LACTATE, POTASSIUM CHLORIDE, AND CALCIUM CHLORIDE 5; .6; .31; .03; .02 G/100ML; G/100ML; G/100ML; G/100ML; G/100ML
INJECTION, SOLUTION INTRAVENOUS CONTINUOUS
Status: DISCONTINUED | OUTPATIENT
Start: 2022-09-23 | End: 2022-09-23 | Stop reason: HOSPADM

## 2022-09-23 RX ORDER — CHOLECALCIFEROL (VITAMIN D3) 25 MCG
1 TABLET,CHEWABLE ORAL DAILY
OUTPATIENT
Start: 2022-09-23

## 2022-09-23 RX ORDER — BISACODYL 10 MG
10 SUPPOSITORY, RECTAL RECTAL ONCE AS NEEDED
Status: DISCONTINUED | OUTPATIENT
Start: 2022-09-23 | End: 2022-09-25

## 2022-09-23 RX ORDER — ONDANSETRON 2 MG/ML
4 INJECTION INTRAMUSCULAR; INTRAVENOUS EVERY 6 HOURS PRN
Status: DISCONTINUED | OUTPATIENT
Start: 2022-09-23 | End: 2022-09-23

## 2022-09-23 RX ORDER — SODIUM CHLORIDE, SODIUM LACTATE, POTASSIUM CHLORIDE, CALCIUM CHLORIDE 600; 310; 30; 20 MG/100ML; MG/100ML; MG/100ML; MG/100ML
INJECTION, SOLUTION INTRAVENOUS CONTINUOUS
Status: DISCONTINUED | OUTPATIENT
Start: 2022-09-23 | End: 2022-09-23 | Stop reason: HOSPADM

## 2022-09-23 RX ORDER — DIAPER,BRIEF,INFANT-TODD,DISP
1 EACH MISCELLANEOUS EVERY 6 HOURS PRN
Status: DISCONTINUED | OUTPATIENT
Start: 2022-09-23 | End: 2022-09-25

## 2022-09-23 RX ORDER — TERBUTALINE SULFATE 1 MG/ML
0.25 INJECTION, SOLUTION SUBCUTANEOUS AS NEEDED
Status: DISCONTINUED | OUTPATIENT
Start: 2022-09-23 | End: 2022-09-23 | Stop reason: HOSPADM

## 2022-09-23 RX ORDER — TRISODIUM CITRATE DIHYDRATE AND CITRIC ACID MONOHYDRATE 500; 334 MG/5ML; MG/5ML
30 SOLUTION ORAL AS NEEDED
Status: DISCONTINUED | OUTPATIENT
Start: 2022-09-23 | End: 2022-09-23 | Stop reason: HOSPADM

## 2022-09-24 PROBLEM — O24.410 DIET CONTROLLED WHITE CLASSIFICATION A1 GESTATIONAL DIABETES MELLITUS (GDM): Status: RESOLVED | Noted: 2022-09-06 | Resolved: 2022-09-24

## 2022-09-24 PROBLEM — O24.410 DIET CONTROLLED WHITE CLASSIFICATION A1 GESTATIONAL DIABETES MELLITUS (GDM) (HCC): Status: RESOLVED | Noted: 2022-09-06 | Resolved: 2022-09-24

## 2022-09-24 PROBLEM — Z34.90 PREGNANCY (HCC): Status: RESOLVED | Noted: 2022-09-23 | Resolved: 2022-09-24

## 2022-09-24 PROBLEM — Z34.90 PREGNANCY: Status: RESOLVED | Noted: 2022-09-23 | Resolved: 2022-09-24

## 2022-09-24 LAB
BASOPHILS # BLD AUTO: 0.04 X10(3) UL (ref 0–0.2)
BASOPHILS NFR BLD AUTO: 0.4 %
DEPRECATED RDW RBC AUTO: 46.1 FL (ref 35.1–46.3)
EOSINOPHIL # BLD AUTO: 0.03 X10(3) UL (ref 0–0.7)
EOSINOPHIL NFR BLD AUTO: 0.3 %
ERYTHROCYTE [DISTWIDTH] IN BLOOD BY AUTOMATED COUNT: 14.2 % (ref 11–15)
HCT VFR BLD AUTO: 31.6 %
HGB BLD-MCNC: 10.3 G/DL
IMM GRANULOCYTES # BLD AUTO: 0.06 X10(3) UL (ref 0–1)
IMM GRANULOCYTES NFR BLD: 0.6 %
LYMPHOCYTES # BLD AUTO: 1.13 X10(3) UL (ref 1–4)
LYMPHOCYTES NFR BLD AUTO: 12.1 %
MCH RBC QN AUTO: 28.9 PG (ref 26–34)
MCHC RBC AUTO-ENTMCNC: 32.6 G/DL (ref 31–37)
MCV RBC AUTO: 88.8 FL
MONOCYTES # BLD AUTO: 0.65 X10(3) UL (ref 0.1–1)
MONOCYTES NFR BLD AUTO: 7 %
NEUTROPHILS # BLD AUTO: 7.41 X10 (3) UL (ref 1.5–7.7)
NEUTROPHILS # BLD AUTO: 7.41 X10(3) UL (ref 1.5–7.7)
NEUTROPHILS NFR BLD AUTO: 79.6 %
PLATELET # BLD AUTO: 140 10(3)UL (ref 150–450)
RBC # BLD AUTO: 3.56 X10(6)UL
WBC # BLD AUTO: 9.3 X10(3) UL (ref 4–11)

## 2022-09-24 NOTE — PROGRESS NOTES
Pt is a 33year old female admitted to TR1/TR1-A. Patient presents with:  R/o Rom: 1200     Pt is  38w5d intra-uterine pregnancy. History obtained, consents signed. Oriented to room, staff, and plan of care.

## 2022-09-24 NOTE — L&D DELIVERY NOTE
[facility]    Vaginal Delivery Note    Magdalena Guido Patient Status:  Inpatient    1990 MRN [id number]   Location [address]  Attending Luís Boucher MD   Hosp Day # 0 PCP None Pcp     Delivery     Infant  Date of Delivery: 2022   Time of Delivery: 8:04 PM  Delivery Type: Normal spontaneous vaginal delivery    Infant Sex/Birthweight: female No birth weight on file. Presentation Vertex [1]  Position          Apgars:  1 minute:                 5 minutes:                          10 minutes:      Placenta  Date/Time of Delivery: 2022  8:08 PM   Delivery: spontaneous  Placenta to Pathology: yes  Cord Gases Submitted: no  Cord Blood Collection: no  Cord Tissue Collection: no  Cord Complications: none  Sponge and Needle Counts:  Verified yes    Maternal Anesthesia: none   Episiotomy/Laceration Repair  Episiotomy: none  Laceration: none    Delivery Complications  none    Neonatologist Present: no  Delivery Comment: Mom and baby doing well. Intake/Output   QBL:  Pending      Alan Ness MD   2022  8:17 PM

## 2022-09-24 NOTE — PROGRESS NOTES
Patient up to bathroom with assist x 2. Voided 200 ml. Patient transferred to mother/baby room 359 per wheelchair in stable condition with baby and personal belongings. Accompanied by significant other and staff. Report given to [facility].

## 2022-09-24 NOTE — PLAN OF CARE
Problem: POSTPARTUM  Goal: Long Term Goal:Experiences normal postpartum course  Description: INTERVENTIONS:  - Assess and monitor vital signs and lab values. - Assess fundus and lochia. - Provide ice/sitz baths for perineum discomfort. - Monitor healing of incision/episiotomy/laceration, and assess for signs and symptoms of infection and hematoma. - Assess bladder function and monitor for bladder distention.  - Provide/instruct/assist with pericare as needed. - Provide VTE prophylaxis as needed. - Monitor bowel function.  - Encourage ambulation and provide assistance as needed. - Assess and monitor emotional status and provide social service/psych resources as needed. - Utilize standard precautions and use personal protective equipment as indicated. Ensure aseptic care of all intravenous lines and invasive tubes/drains.  - Obtain immunization and exposure to communicable diseases history. 9/24/2022 0058 by Arash Francisco RN  Outcome: Progressing  9/24/2022 0057 by Arash Francisco RN  Outcome: Progressing  Goal: Optimize infant feeding at the breast  Description: INTERVENTIONS:  - Initiate breast feeding within first hour after birth. - Monitor effectiveness of current breast feeding efforts. - Assess support systems available to mother/family.  - Identify cultural beliefs/practices regarding lactation, letdown techniques, maternal food preferences. - Assess mother's knowledge and previous experience with breast feeding.  - Provide information as needed about early infant feeding cues (e.g., rooting, lip smacking, sucking fingers/hand) versus late cue of crying.  - Discuss/demonstrate breast feeding aids (e.g., infant sling, nursing footstool/pillows, and breast pumps). - Encourage mother/other family members to express feelings/concerns, and actively listen. - Educate father/SO about benefits of breast feeding and how to manage common lactation challenges.   - Recommend avoidance of specific medications or substances incompatible with breast feeding.  - Assess and monitor for signs of nipple pain/trauma. - Instruct and provide assistance with proper latch. - Review techniques for milk expression (breast pumping) and storage of breast milk. Provide pumping equipment/supplies, instructions and assistance, as needed. - Encourage rooming-in and breast feeding on demand.  - Encourage skin-to-skin contact. - Provide LC support as needed. - Assess for and manage engorgement. - Provide breast feeding education handouts and information on community breast feeding support. 2022 by Susu Jones RN  Outcome: Progressing  2022 by Susu Jones RN  Outcome: Progressing  Goal: Establishment of adequate milk supply with medication/procedure interruptions  Description: INTERVENTIONS:  - Review techniques for milk expression (breast pumping). - Provide pumping equipment/supplies, instructions, and assistance until it is safe to breastfeed infant. 2022 by Susu Jones RN  Outcome: Progressing  2022 by Susu oJnes RN  Outcome: Progressing  Goal: Experiences normal breast weaning course  Description: INTERVENTIONS:  - Assess for and manage engorgement. - Instruct on breast care. - Provide comfort measures. 2022 by Susu Jones RN  Outcome: Progressing  2022 by Susu Jones RN  Outcome: Progressing  Goal: Appropriate maternal -  bonding  Description: INTERVENTIONS:  - Assess caregiver- interactions. - Assess caregiver's emotional status and coping mechanisms. - Encourage caregiver to participate in  daily care. - Assess support systems available to mother/family.  - Provide /case management support as needed.   2022 by Susu Jones RN  Outcome: Progressing  2022 by Susu Jones RN  Outcome: Progressing

## 2022-09-25 VITALS
DIASTOLIC BLOOD PRESSURE: 50 MMHG | RESPIRATION RATE: 16 BRPM | TEMPERATURE: 98 F | SYSTOLIC BLOOD PRESSURE: 104 MMHG | HEART RATE: 60 BPM

## 2022-09-25 NOTE — PROGRESS NOTES
Patient and family ready for discharge per MD orders. D/c instructions reviewed with family, verbalize understanding. All questions answered. Encouraged to call MD with any questions or concerns. Aware of need to set follow up appt and verbalize understanding of all medications. Patient and family left at this time with all belongings. Escorted out in stable condition with partner and baby to own vehicle.

## 2022-09-25 NOTE — CM/SW NOTE
MDO to SW for maternal and  paternal birthcertificate rights. Pt made rn aware of paternal relationship; rn concerned for pts care at home post discharge related to paternal issues. Pt denied any concerns re FOB/spouse as noted above. Per RN, pt completed birth certificate without concerns/further questions. SW informed RN of birth certificate contact Jennifer Dixon [id number]. SW assessed pt via  [employer] ID #[id number] . SW confirmed face sheet contact as correct. Baby boy/girl name:Baby girl Kaiden  Date & time of delivery:2022 @ 8:04pm  Delivery method:Normal spontaneous vaginal delivery  Siblings age:9 yr old    Patient employed: Yes  Length of maternity leave:12 weeks    Father of baby employed:Yes  Length of paternity leave:n/a    Breast or formula feed:Breast formula feed    Pediatrician:Dr. Felipa Tuttle    Infant Insurance:Medicaid  Change HC contacted:Yes    Mental Health History: Denied    Medications:n/a    Therapist:n/a    Psychiatrist:n/a    SW discussed signs, symptoms and risks associated with post partum depression & anxiety. BETO provided pt with PMAD resources in [facility]. Other resources provided:[facility] resources    Patient support system: FOB    Patient denied current questions/needs from SW.    SW/CM to remain available for support and/or discharge planning.       TODD Rebollar, [facility], [place]  Social Work   VGZ:#[phone]

## 2022-09-27 ENCOUNTER — PATIENT OUTREACH (OUTPATIENT)
Dept: CASE MANAGEMENT | Age: 32
End: 2022-09-27

## 2022-09-27 NOTE — PROGRESS NOTES
VM received; pt requesting assistance w/scheduling apt (anu 09/23)    Dr Lindsay Rodriguez  [id number] [facility] [address]  [phone]  Follow up 3 weeks  Apt made:  Tue 10/18 @9:10am  Confirmed w/pt  Closing encounter

## 2022-10-18 ENCOUNTER — POSTPARTUM (OUTPATIENT)
Dept: OBGYN CLINIC | Facility: CLINIC | Age: 32
End: 2022-10-18
Payer: MEDICAID

## 2022-10-18 VITALS
BODY MASS INDEX: 30 KG/M2 | WEIGHT: 176.81 LBS | DIASTOLIC BLOOD PRESSURE: 72 MMHG | SYSTOLIC BLOOD PRESSURE: 109 MMHG | HEART RATE: 69 BPM

## 2022-10-18 DIAGNOSIS — Z30.013 ENCOUNTER FOR INITIAL PRESCRIPTION OF INJECTABLE CONTRACEPTIVE: Primary | ICD-10-CM

## 2022-10-18 PROCEDURE — 96372 THER/PROPH/DIAG INJ SC/IM: CPT | Performed by: OBSTETRICS & GYNECOLOGY

## 2022-10-18 PROCEDURE — 3074F SYST BP LT 130 MM HG: CPT | Performed by: OBSTETRICS & GYNECOLOGY

## 2022-10-18 PROCEDURE — 3078F DIAST BP <80 MM HG: CPT | Performed by: OBSTETRICS & GYNECOLOGY

## 2022-10-18 PROCEDURE — 81025 URINE PREGNANCY TEST: CPT | Performed by: OBSTETRICS & GYNECOLOGY

## 2022-10-18 PROCEDURE — 0503F POSTPARTUM CARE VISIT: CPT | Performed by: OBSTETRICS & GYNECOLOGY

## 2022-10-18 RX ORDER — MEDROXYPROGESTERONE ACETATE 150 MG/ML
150 INJECTION, SUSPENSION INTRAMUSCULAR
Status: SHIPPED | OUTPATIENT
Start: 2022-10-18

## 2022-10-18 RX ADMIN — MEDROXYPROGESTERONE ACETATE 150 MG: 150 INJECTION, SUSPENSION INTRAMUSCULAR at 10:06:00

## 2022-10-19 ENCOUNTER — TELEPHONE (OUTPATIENT)
Dept: OBGYN CLINIC | Facility: CLINIC | Age: 32
End: 2022-10-19

## 2022-10-19 NOTE — TELEPHONE ENCOUNTER
Mailbox full, unable to leave VM. Incoming fax received from [employer]. Need patient to indicate if she would like car seat or crib and what size diapers. Form left in brown folder in nurse station.

## 2022-10-20 NOTE — TELEPHONE ENCOUNTER
Pt Name and  verified. Pt would need diapers in size 1. Form to be filled out and faxed back to insurance plan.

## 2023-01-04 ENCOUNTER — NURSE ONLY (OUTPATIENT)
Dept: OBGYN CLINIC | Facility: CLINIC | Age: 33
End: 2023-01-04
Payer: MEDICAID

## 2023-01-04 VITALS — SYSTOLIC BLOOD PRESSURE: 114 MMHG | DIASTOLIC BLOOD PRESSURE: 60 MMHG

## 2023-01-04 DIAGNOSIS — Z30.013 ENCOUNTER FOR INITIAL PRESCRIPTION OF INJECTABLE CONTRACEPTIVE: Primary | ICD-10-CM

## 2023-03-03 ENCOUNTER — TELEPHONE (OUTPATIENT)
Dept: OBGYN CLINIC | Facility: CLINIC | Age: 33
End: 2023-03-03

## 2023-03-03 NOTE — TELEPHONE ENCOUNTER
Patient name and  verified. Patient given next depo window of (3/22-) and also informed of need for annual. Annual visit scheduled 3/22 with RAMON and will receive depo at that time. Aware of scheduling details.

## 2023-03-22 ENCOUNTER — OFFICE VISIT (OUTPATIENT)
Dept: OBGYN CLINIC | Facility: CLINIC | Age: 33
End: 2023-03-22

## 2023-03-22 VITALS — BODY MASS INDEX: 32 KG/M2 | WEIGHT: 184 LBS | SYSTOLIC BLOOD PRESSURE: 105 MMHG | DIASTOLIC BLOOD PRESSURE: 70 MMHG

## 2023-03-22 DIAGNOSIS — Z01.419 ENCOUNTER FOR WELL WOMAN EXAM WITH ROUTINE GYNECOLOGICAL EXAM: Primary | ICD-10-CM

## 2023-03-22 LAB
CONTROL LINE PRESENT WITH A CLEAR BACKGROUND (YES/NO): YES YES/NO
PREGNANCY TEST, URINE: NEGATIVE

## 2023-03-22 PROCEDURE — 3074F SYST BP LT 130 MM HG: CPT | Performed by: OBSTETRICS & GYNECOLOGY

## 2023-03-22 PROCEDURE — 81025 URINE PREGNANCY TEST: CPT | Performed by: OBSTETRICS & GYNECOLOGY

## 2023-03-22 PROCEDURE — 3078F DIAST BP <80 MM HG: CPT | Performed by: OBSTETRICS & GYNECOLOGY

## 2023-03-22 PROCEDURE — 96372 THER/PROPH/DIAG INJ SC/IM: CPT | Performed by: OBSTETRICS & GYNECOLOGY

## 2023-03-22 PROCEDURE — 99395 PREV VISIT EST AGE 18-39: CPT | Performed by: OBSTETRICS & GYNECOLOGY

## 2023-03-22 RX ORDER — MEDROXYPROGESTERONE ACETATE 150 MG/ML
150 INJECTION, SUSPENSION INTRAMUSCULAR ONCE
Status: COMPLETED | OUTPATIENT
Start: 2023-03-22 | End: 2023-03-22

## 2023-03-22 RX ADMIN — MEDROXYPROGESTERONE ACETATE 150 MG: 150 INJECTION, SUSPENSION INTRAMUSCULAR at 12:02:00

## 2023-03-23 LAB — HPV I/H RISK 1 DNA SPEC QL NAA+PROBE: NEGATIVE

## 2024-07-22 ENCOUNTER — APPOINTMENT (OUTPATIENT)
Dept: ULTRASOUND IMAGING | Facility: HOSPITAL | Age: 34
End: 2024-07-22
Attending: EMERGENCY MEDICINE

## 2024-07-22 ENCOUNTER — HOSPITAL ENCOUNTER (EMERGENCY)
Facility: HOSPITAL | Age: 34
Discharge: HOME | End: 2024-07-22
Attending: EMERGENCY MEDICINE

## 2024-07-22 VITALS
SYSTOLIC BLOOD PRESSURE: 104 MMHG | HEART RATE: 78 BPM | TEMPERATURE: 98 F | RESPIRATION RATE: 18 BRPM | OXYGEN SATURATION: 98 % | DIASTOLIC BLOOD PRESSURE: 72 MMHG

## 2024-07-22 DIAGNOSIS — R19.7 DIARRHEA, UNSPECIFIED TYPE: ICD-10-CM

## 2024-07-22 DIAGNOSIS — Z34.91 FIRST TRIMESTER PREGNANCY (HCC): Primary | ICD-10-CM

## 2024-07-22 DIAGNOSIS — R10.9 ABDOMINAL PAIN, LEFT LATERAL: ICD-10-CM

## 2024-07-22 LAB
ALBUMIN SERPL-MCNC: 4.4 G/DL (ref 3.2–4.8)
ALBUMIN/GLOB SERPL: 1.8 {RATIO} (ref 1–2)
ALP LIVER SERPL-CCNC: 76 U/L
ALT SERPL-CCNC: 27 U/L
ANION GAP SERPL CALC-SCNC: 6 MMOL/L (ref 0–18)
AST SERPL-CCNC: 17 U/L (ref ?–34)
B-HCG SERPL-ACNC: ABNORMAL MIU/ML
B-HCG UR QL: POSITIVE
BASOPHILS # BLD AUTO: 0.03 X10(3) UL (ref 0–0.2)
BASOPHILS NFR BLD AUTO: 0.5 %
BILIRUB SERPL-MCNC: 0.6 MG/DL (ref 0.3–1.2)
BILIRUB UR QL: NEGATIVE
BUN BLD-MCNC: 12 MG/DL (ref 9–23)
BUN/CREAT SERPL: 20 (ref 10–20)
CALCIUM BLD-MCNC: 9.1 MG/DL (ref 8.7–10.4)
CHLORIDE SERPL-SCNC: 109 MMOL/L (ref 98–112)
CO2 SERPL-SCNC: 27 MMOL/L (ref 21–32)
COLOR UR: YELLOW
CREAT BLD-MCNC: 0.6 MG/DL
DEPRECATED RDW RBC AUTO: 41.1 FL (ref 35.1–46.3)
EGFRCR SERPLBLD CKD-EPI 2021: 121 ML/MIN/1.73M2 (ref 60–?)
EOSINOPHIL # BLD AUTO: 0.1 X10(3) UL (ref 0–0.7)
EOSINOPHIL NFR BLD AUTO: 1.8 %
ERYTHROCYTE [DISTWIDTH] IN BLOOD BY AUTOMATED COUNT: 12.9 % (ref 11–15)
GLOBULIN PLAS-MCNC: 2.5 G/DL (ref 2–3.5)
GLUCOSE BLD-MCNC: 102 MG/DL (ref 70–99)
GLUCOSE UR-MCNC: NORMAL MG/DL
HCT VFR BLD AUTO: 40.6 %
HGB BLD-MCNC: 13.7 G/DL
HGB UR QL STRIP.AUTO: NEGATIVE
IMM GRANULOCYTES # BLD AUTO: 0.05 X10(3) UL (ref 0–1)
IMM GRANULOCYTES NFR BLD: 0.9 %
KETONES UR-MCNC: NEGATIVE MG/DL
LEUKOCYTE ESTERASE UR QL STRIP.AUTO: NEGATIVE
LIPASE SERPL-CCNC: 35 U/L (ref 12–53)
LYMPHOCYTES # BLD AUTO: 1.43 X10(3) UL (ref 1–4)
LYMPHOCYTES NFR BLD AUTO: 26 %
MCH RBC QN AUTO: 29.7 PG (ref 26–34)
MCHC RBC AUTO-ENTMCNC: 33.7 G/DL (ref 31–37)
MCV RBC AUTO: 87.9 FL
MONOCYTES # BLD AUTO: 0.37 X10(3) UL (ref 0.1–1)
MONOCYTES NFR BLD AUTO: 6.7 %
NEUTROPHILS # BLD AUTO: 3.53 X10 (3) UL (ref 1.5–7.7)
NEUTROPHILS # BLD AUTO: 3.53 X10(3) UL (ref 1.5–7.7)
NEUTROPHILS NFR BLD AUTO: 64.1 %
NITRITE UR QL STRIP.AUTO: NEGATIVE
OSMOLALITY SERPL CALC.SUM OF ELEC: 294 MOSM/KG (ref 275–295)
PH UR: 5.5 [PH] (ref 5–8)
PLATELET # BLD AUTO: 176 10(3)UL (ref 150–450)
POTASSIUM SERPL-SCNC: 4.2 MMOL/L (ref 3.5–5.1)
PROT SERPL-MCNC: 6.9 G/DL (ref 5.7–8.2)
PROT UR-MCNC: 20 MG/DL
RBC # BLD AUTO: 4.62 X10(6)UL
SODIUM SERPL-SCNC: 142 MMOL/L (ref 136–145)
SP GR UR STRIP: >1.03 (ref 1–1.03)
UROBILINOGEN UR STRIP-ACNC: NORMAL
WBC # BLD AUTO: 5.5 X10(3) UL (ref 4–11)

## 2024-07-22 PROCEDURE — 83690 ASSAY OF LIPASE: CPT | Performed by: EMERGENCY MEDICINE

## 2024-07-22 PROCEDURE — 85025 COMPLETE CBC W/AUTO DIFF WBC: CPT | Performed by: EMERGENCY MEDICINE

## 2024-07-22 PROCEDURE — 80053 COMPREHEN METABOLIC PANEL: CPT

## 2024-07-22 PROCEDURE — 96361 HYDRATE IV INFUSION ADD-ON: CPT

## 2024-07-22 PROCEDURE — 76801 OB US < 14 WKS SINGLE FETUS: CPT | Performed by: EMERGENCY MEDICINE

## 2024-07-22 PROCEDURE — 96360 HYDRATION IV INFUSION INIT: CPT

## 2024-07-22 PROCEDURE — 76817 TRANSVAGINAL US OBSTETRIC: CPT | Performed by: EMERGENCY MEDICINE

## 2024-07-22 PROCEDURE — 83690 ASSAY OF LIPASE: CPT

## 2024-07-22 PROCEDURE — 76775 US EXAM ABDO BACK WALL LIM: CPT | Performed by: EMERGENCY MEDICINE

## 2024-07-22 PROCEDURE — 85025 COMPLETE CBC W/AUTO DIFF WBC: CPT

## 2024-07-22 PROCEDURE — 80053 COMPREHEN METABOLIC PANEL: CPT | Performed by: EMERGENCY MEDICINE

## 2024-07-22 PROCEDURE — 81025 URINE PREGNANCY TEST: CPT

## 2024-07-22 PROCEDURE — 99285 EMERGENCY DEPT VISIT HI MDM: CPT

## 2024-07-22 PROCEDURE — 84702 CHORIONIC GONADOTROPIN TEST: CPT | Performed by: EMERGENCY MEDICINE

## 2024-07-22 PROCEDURE — 81001 URINALYSIS AUTO W/SCOPE: CPT | Performed by: EMERGENCY MEDICINE

## 2024-07-22 RX ORDER — ACETAMINOPHEN 500 MG
1000 TABLET ORAL ONCE
Status: COMPLETED | OUTPATIENT
Start: 2024-07-22 | End: 2024-07-22

## 2024-07-22 NOTE — ED INITIAL ASSESSMENT (HPI)
Patient presents to ED with 3 weeks of LLQ pain that's worsened recently. Patient endorses NVD. Patient is also complaining of headache

## 2024-07-22 NOTE — ED PROVIDER NOTES
Patient Seen in: [facility] Emergency Department      History     Chief Complaint   Patient presents with    Abdomen/Flank Pain     Stated Complaint: LLQ pain, nausea, diarrhea, fever.    Subjective:   HPI    The patient is a 34-year-old female who presents with 3 weeks of left lower quadrant abdominal pain, 1 week of diarrhea and occasional nausea.  No fever but positive chills.  No known sick contacts recent travel or antibiotics.  Also her last menstrual period was 6/11/24 and her menstrual cycle is late.  No vaginal bleeding or discharge.  No dysuria urgency frequency or hematuria.    Objective:   No pertinent past medical history.            No pertinent past surgical history.              No pertinent social history.            Review of Systems    Positive for stated Chief Complaint: Abdomen/Flank Pain    Other systems are as noted in HPI.  Constitutional and vital signs reviewed.      All other systems reviewed and negative except as noted above.    Physical Exam     ED Triage Vitals [07/22/24 1125]   /66   Pulse 79   Resp 18   Temp 97.6 °F (36.4 °C)   Temp src Temporal   SpO2 100 %   O2 Device None (Room air)       Current Vitals:   Vital Signs  BP: 120/66  Pulse: 79  Resp: 18  Temp: 97.6 °F (36.4 °C)  Temp src: Temporal    Oxygen Therapy  SpO2: 100 %  O2 Device: None (Room air)            Physical Exam  Vitals and nursing note reviewed.   Constitutional:       General: She is not in acute distress.     Appearance: Normal appearance. She is well-developed. She is not ill-appearing.   HENT:      Head: Normocephalic and atraumatic.      Mouth/Throat:      Mouth: Mucous membranes are moist.   Eyes:      Conjunctiva/sclera: Conjunctivae normal.      Pupils: Pupils are equal, round, and reactive to light.   Neck:      Vascular: No JVD.   Cardiovascular:      Rate and Rhythm: Normal rate and regular rhythm.      Heart sounds: Normal heart sounds. No murmur heard.  Pulmonary:      Effort: Pulmonary  effort is normal.      Breath sounds: Normal breath sounds.   Abdominal:      General: Bowel sounds are normal. There is no distension.      Palpations: Abdomen is soft. There is no mass.      Tenderness: There is abdominal tenderness in the left lower quadrant. There is no right CVA tenderness, left CVA tenderness, guarding or rebound.   Musculoskeletal:         General: Normal range of motion.      Cervical back: Normal range of motion and neck supple.   Skin:     General: Skin is warm and dry.      Capillary Refill: Capillary refill takes less than 2 seconds.      Findings: No rash.   Neurological:      General: No focal deficit present.      Mental Status: She is alert and oriented to person, place, and time.      Deep Tendon Reflexes: Reflexes are normal and symmetric.   Psychiatric:         Judgment: Judgment normal.       Differential diagnosis includes but is not limited to colitis, enteritis, ectopic, UTI        ED Course     Labs Reviewed   COMP METABOLIC PANEL (14) - Abnormal; Notable for the following components:       Result Value    Glucose 102 (*)     All other components within normal limits   URINALYSIS, ROUTINE - Abnormal; Notable for the following components:    Clarity Urine Ex.Turbid (*)     Spec Gravity >1.030 (*)     Protein Urine 20 (*)     RBC Urine 3-5 (*)     Squamous Epi. Cells Few (*)     All other components within normal limits   HCG, BETA SUBUNIT (QUANT PREGNANCY TEST) - Abnormal; Notable for the following components:    Hcg Quantitative 24,904.9 (*)     All other components within normal limits   POCT PREGNANCY URINE - Abnormal; Notable for the following components:    POCT Urine Pregnancy Positive (*)     All other components within normal limits   LIPASE - Normal   CBC WITH DIFFERENTIAL WITH PLATELET    Narrative:     The following orders were created for panel order CBC With Differential With Platelet.  Procedure                               Abnormality         Status                      ---------                               -----------         ------                     CBC W/ DIFFERENTIAL[[id number]]                              Final result                 Please view results for these tests on the individual orders.   CBC W/ DIFFERENTIAL                      MDM                                         Medical Decision Making  Patient was newly diagnosed early pregnancy approximately 5 weeks understands to start prenatal vitamins and make first prenatal appointment with her OB  Blood work urine and ultrasound of pelvis and kidneys negative for etiology of patient's pain i.e. no hydronephrosis indicating kidney stone and ovaries appear normal  Possibly enteritis with history of diarrhea other infectious etiology unlikely as has been going on for a few weeks and no other fever or vomiting  Clear liquids bland diet  And return if worse  Repeat exam with very minimal tenderness patient tolerating p.o. without difficulty and vital signs stable  Patient comfortable with discharge plan    Problems Addressed:  Abdominal pain, left lateral: acute illness or injury  Diarrhea, unspecified type: acute illness or injury  First trimester pregnancy (HCC): acute illness or injury    Amount and/or Complexity of Data Reviewed  External Data Reviewed: labs and notes.     Details: From 3/23 with OB visit patient is  with previous ectopic per notes  Labs: ordered.  Radiology: ordered and independent interpretation performed.     Details: No hydronephrosis other results per radiologist    Risk  OTC drugs.        Disposition and Plan     Clinical Impression:  1. First trimester pregnancy (HCC)    2. Abdominal pain, left lateral    3. Diarrhea, unspecified type         Disposition:  Discharge  2024  8:06 pm    Follow-up:  Timothy Nogueira MD  [address] [address] [phone]    Follow up            Medications Prescribed:  Current Discharge Medication List

## 2024-07-23 NOTE — DISCHARGE INSTRUCTIONS
Start prenatal vitamins  Clear liquids  [place] diet, bananas rice applesauce toast  Return if increased pain fever vomiting  Follow-up with OB to establish prenatal care

## 2024-08-26 ENCOUNTER — TELEPHONE (OUTPATIENT)
Dept: OBGYN CLINIC | Facility: CLINIC | Age: 34
End: 2024-08-26

## 2024-08-26 NOTE — TELEPHONE ENCOUNTER
#[id number]    LMP: 6/10  + home UPT: 7/15  Regular cycle  Denies hx SAB    Pt was seen in ED 7/22 for abdominal pain and is requesting follow-up appointment with Dr. Nogueira. Next available appointment offered 8/27. Pt requesting to be seen today by a different provider. Appointment scheduled with Dr. Garcia 8/26. Pt informed to schedule RN OB education phone visit after appointment. Verbalized understanding.

## 2024-08-26 NOTE — TELEPHONE ENCOUNTER
Patient called to schedule missed menses appointment. Last menstrual period 6/10. Patient needs paperwork confirming pregnancy in order to submit for Medicaid approval. Please call with .

## 2024-08-27 ENCOUNTER — ANESTHESIA EVENT (OUTPATIENT)
Dept: SURGERY | Facility: HOSPITAL | Age: 34
End: 2024-08-27

## 2024-08-27 ENCOUNTER — APPOINTMENT (OUTPATIENT)
Dept: ULTRASOUND IMAGING | Facility: HOSPITAL | Age: 34
End: 2024-08-27
Attending: EMERGENCY MEDICINE

## 2024-08-27 ENCOUNTER — HOSPITAL ENCOUNTER (OUTPATIENT)
Facility: HOSPITAL | Age: 34
Setting detail: OBSERVATION
Discharge: HOME | End: 2024-08-28
Attending: EMERGENCY MEDICINE | Admitting: STUDENT IN AN ORGANIZED HEALTH CARE EDUCATION/TRAINING PROGRAM

## 2024-08-27 ENCOUNTER — ANESTHESIA (OUTPATIENT)
Dept: SURGERY | Facility: HOSPITAL | Age: 34
End: 2024-08-27

## 2024-08-27 DIAGNOSIS — R10.9 ABDOMINAL PAIN OF UNKNOWN ETIOLOGY: Primary | ICD-10-CM

## 2024-08-27 DIAGNOSIS — O02.1 MISSED ABORTION (HCC): ICD-10-CM

## 2024-08-27 DIAGNOSIS — O02.1 MISSED AB (HCC): ICD-10-CM

## 2024-08-27 LAB
ANION GAP SERPL CALC-SCNC: 6 MMOL/L (ref 0–18)
B-HCG SERPL-ACNC: ABNORMAL MIU/ML
B-HCG UR QL: POSITIVE
BASOPHILS # BLD AUTO: 0.03 X10(3) UL (ref 0–0.2)
BASOPHILS NFR BLD AUTO: 0.6 %
BILIRUB UR QL: NEGATIVE
BUN BLD-MCNC: 11 MG/DL (ref 9–23)
BUN/CREAT SERPL: 17.5 (ref 10–20)
CALCIUM BLD-MCNC: 9.4 MG/DL (ref 8.7–10.4)
CHLORIDE SERPL-SCNC: 107 MMOL/L (ref 98–112)
CLARITY UR: CLEAR
CO2 SERPL-SCNC: 29 MMOL/L (ref 21–32)
COLOR UR: YELLOW
CREAT BLD-MCNC: 0.63 MG/DL
DEPRECATED RDW RBC AUTO: 40.6 FL (ref 35.1–46.3)
EGFRCR SERPLBLD CKD-EPI 2021: 120 ML/MIN/1.73M2 (ref 60–?)
EOSINOPHIL # BLD AUTO: 0.1 X10(3) UL (ref 0–0.7)
EOSINOPHIL NFR BLD AUTO: 2 %
ERYTHROCYTE [DISTWIDTH] IN BLOOD BY AUTOMATED COUNT: 12.8 % (ref 11–15)
GLUCOSE BLD-MCNC: 135 MG/DL (ref 70–99)
GLUCOSE BLDC GLUCOMTR-MCNC: 105 MG/DL (ref 70–99)
GLUCOSE UR-MCNC: NORMAL MG/DL
HCT VFR BLD AUTO: 39.5 %
HGB BLD-MCNC: 13.6 G/DL
HGB UR QL STRIP.AUTO: NEGATIVE
IMM GRANULOCYTES # BLD AUTO: 0.04 X10(3) UL (ref 0–1)
IMM GRANULOCYTES NFR BLD: 0.8 %
KETONES UR-MCNC: NEGATIVE MG/DL
LEUKOCYTE ESTERASE UR QL STRIP.AUTO: 75
LYMPHOCYTES # BLD AUTO: 1.19 X10(3) UL (ref 1–4)
LYMPHOCYTES NFR BLD AUTO: 23.9 %
MCH RBC QN AUTO: 30.2 PG (ref 26–34)
MCHC RBC AUTO-ENTMCNC: 34.4 G/DL (ref 31–37)
MCV RBC AUTO: 87.8 FL
MONOCYTES # BLD AUTO: 0.33 X10(3) UL (ref 0.1–1)
MONOCYTES NFR BLD AUTO: 6.6 %
NEUTROPHILS # BLD AUTO: 3.29 X10 (3) UL (ref 1.5–7.7)
NEUTROPHILS # BLD AUTO: 3.29 X10(3) UL (ref 1.5–7.7)
NEUTROPHILS NFR BLD AUTO: 66.1 %
NITRITE UR QL STRIP.AUTO: NEGATIVE
OSMOLALITY SERPL CALC.SUM OF ELEC: 295 MOSM/KG (ref 275–295)
PH UR: 5.5 [PH] (ref 5–8)
PLATELET # BLD AUTO: 158 10(3)UL (ref 150–450)
POTASSIUM SERPL-SCNC: 3.9 MMOL/L (ref 3.5–5.1)
PROT UR-MCNC: NEGATIVE MG/DL
RBC # BLD AUTO: 4.5 X10(6)UL
SODIUM SERPL-SCNC: 142 MMOL/L (ref 136–145)
SP GR UR STRIP: 1.03 (ref 1–1.03)
UROBILINOGEN UR STRIP-ACNC: NORMAL
WBC # BLD AUTO: 5 X10(3) UL (ref 4–11)

## 2024-08-27 PROCEDURE — 96374 THER/PROPH/DIAG INJ IV PUSH: CPT

## 2024-08-27 PROCEDURE — 88305 TISSUE EXAM BY PATHOLOGIST: CPT | Performed by: STUDENT IN AN ORGANIZED HEALTH CARE EDUCATION/TRAINING PROGRAM

## 2024-08-27 PROCEDURE — 80048 BASIC METABOLIC PNL TOTAL CA: CPT | Performed by: EMERGENCY MEDICINE

## 2024-08-27 PROCEDURE — 81001 URINALYSIS AUTO W/SCOPE: CPT | Performed by: EMERGENCY MEDICINE

## 2024-08-27 PROCEDURE — 82962 GLUCOSE BLOOD TEST: CPT

## 2024-08-27 PROCEDURE — 76770 US EXAM ABDO BACK WALL COMP: CPT | Performed by: EMERGENCY MEDICINE

## 2024-08-27 PROCEDURE — 99285 EMERGENCY DEPT VISIT HI MDM: CPT

## 2024-08-27 PROCEDURE — 96375 TX/PRO/DX INJ NEW DRUG ADDON: CPT

## 2024-08-27 PROCEDURE — 84702 CHORIONIC GONADOTROPIN TEST: CPT | Performed by: EMERGENCY MEDICINE

## 2024-08-27 PROCEDURE — 87086 URINE CULTURE/COLONY COUNT: CPT | Performed by: EMERGENCY MEDICINE

## 2024-08-27 PROCEDURE — 81025 URINE PREGNANCY TEST: CPT

## 2024-08-27 PROCEDURE — 85025 COMPLETE CBC W/AUTO DIFF WBC: CPT | Performed by: EMERGENCY MEDICINE

## 2024-08-27 PROCEDURE — 76817 TRANSVAGINAL US OBSTETRIC: CPT | Performed by: EMERGENCY MEDICINE

## 2024-08-27 PROCEDURE — 76801 OB US < 14 WKS SINGLE FETUS: CPT | Performed by: EMERGENCY MEDICINE

## 2024-08-27 PROCEDURE — 10D17ZZ EXTRACTION OF PRODUCTS OF CONCEPTION, RETAINED, VIA NATURAL OR ARTIFICIAL OPENING: ICD-10-PCS | Performed by: STUDENT IN AN ORGANIZED HEALTH CARE EDUCATION/TRAINING PROGRAM

## 2024-08-27 RX ORDER — ONDANSETRON 4 MG/1
4 TABLET, FILM COATED ORAL EVERY 8 HOURS PRN
Status: DISCONTINUED | OUTPATIENT
Start: 2024-08-27 | End: 2024-08-28

## 2024-08-27 RX ORDER — METOCLOPRAMIDE HYDROCHLORIDE 5 MG/ML
5 INJECTION INTRAMUSCULAR; INTRAVENOUS ONCE
Status: COMPLETED | OUTPATIENT
Start: 2024-08-27 | End: 2024-08-27

## 2024-08-27 RX ORDER — DEXTROSE MONOHYDRATE 25 G/50ML
50 INJECTION, SOLUTION INTRAVENOUS
Status: DISCONTINUED | OUTPATIENT
Start: 2024-08-27 | End: 2024-08-27 | Stop reason: HOSPADM

## 2024-08-27 RX ORDER — LIDOCAINE HYDROCHLORIDE 10 MG/ML
INJECTION, SOLUTION EPIDURAL; INFILTRATION; INTRACAUDAL; PERINEURAL AS NEEDED
Status: DISCONTINUED | OUTPATIENT
Start: 2024-08-27 | End: 2024-08-27 | Stop reason: SURG

## 2024-08-27 RX ORDER — DIPHENHYDRAMINE HYDROCHLORIDE 50 MG/ML
25 INJECTION INTRAMUSCULAR; INTRAVENOUS ONCE
Status: COMPLETED | OUTPATIENT
Start: 2024-08-27 | End: 2024-08-27

## 2024-08-27 RX ORDER — IBUPROFEN 600 MG/1
600 TABLET, FILM COATED ORAL EVERY 6 HOURS PRN
Status: DISCONTINUED | OUTPATIENT
Start: 2024-08-27 | End: 2024-08-28

## 2024-08-27 RX ORDER — SODIUM CHLORIDE, SODIUM LACTATE, POTASSIUM CHLORIDE, CALCIUM CHLORIDE 600; 310; 30; 20 MG/100ML; MG/100ML; MG/100ML; MG/100ML
INJECTION, SOLUTION INTRAVENOUS CONTINUOUS
Status: DISCONTINUED | OUTPATIENT
Start: 2024-08-27 | End: 2024-08-27 | Stop reason: HOSPADM

## 2024-08-27 RX ORDER — ONDANSETRON 2 MG/ML
4 INJECTION INTRAMUSCULAR; INTRAVENOUS EVERY 6 HOURS PRN
Status: DISCONTINUED | OUTPATIENT
Start: 2024-08-27 | End: 2024-08-27 | Stop reason: HOSPADM

## 2024-08-27 RX ORDER — IBUPROFEN 400 MG/1
400 TABLET, FILM COATED ORAL EVERY 6 HOURS PRN
Status: DISCONTINUED | OUTPATIENT
Start: 2024-08-27 | End: 2024-08-28

## 2024-08-27 RX ORDER — NICOTINE POLACRILEX 4 MG
30 LOZENGE BUCCAL
Status: DISCONTINUED | OUTPATIENT
Start: 2024-08-27 | End: 2024-08-27 | Stop reason: HOSPADM

## 2024-08-27 RX ORDER — ONDANSETRON 2 MG/ML
INJECTION INTRAMUSCULAR; INTRAVENOUS AS NEEDED
Status: DISCONTINUED | OUTPATIENT
Start: 2024-08-27 | End: 2024-08-27 | Stop reason: SURG

## 2024-08-27 RX ORDER — PROCHLORPERAZINE EDISYLATE 5 MG/ML
5 INJECTION INTRAMUSCULAR; INTRAVENOUS EVERY 8 HOURS PRN
Status: DISCONTINUED | OUTPATIENT
Start: 2024-08-27 | End: 2024-08-27 | Stop reason: HOSPADM

## 2024-08-27 RX ORDER — MORPHINE SULFATE 4 MG/ML
2 INJECTION, SOLUTION INTRAMUSCULAR; INTRAVENOUS EVERY 10 MIN PRN
Status: DISCONTINUED | OUTPATIENT
Start: 2024-08-27 | End: 2024-08-27 | Stop reason: HOSPADM

## 2024-08-27 RX ORDER — MORPHINE SULFATE 4 MG/ML
4 INJECTION, SOLUTION INTRAMUSCULAR; INTRAVENOUS EVERY 10 MIN PRN
Status: DISCONTINUED | OUTPATIENT
Start: 2024-08-27 | End: 2024-08-27 | Stop reason: HOSPADM

## 2024-08-27 RX ORDER — MEPERIDINE HYDROCHLORIDE 25 MG/ML
12.5 INJECTION INTRAMUSCULAR; INTRAVENOUS; SUBCUTANEOUS AS NEEDED
Status: DISCONTINUED | OUTPATIENT
Start: 2024-08-27 | End: 2024-08-27 | Stop reason: HOSPADM

## 2024-08-27 RX ORDER — IBUPROFEN 200 MG
200 TABLET ORAL EVERY 6 HOURS PRN
Status: DISCONTINUED | OUTPATIENT
Start: 2024-08-27 | End: 2024-08-28

## 2024-08-27 RX ORDER — HYDROMORPHONE HYDROCHLORIDE 1 MG/ML
0.2 INJECTION, SOLUTION INTRAMUSCULAR; INTRAVENOUS; SUBCUTANEOUS EVERY 5 MIN PRN
Status: DISCONTINUED | OUTPATIENT
Start: 2024-08-27 | End: 2024-08-27 | Stop reason: HOSPADM

## 2024-08-27 RX ORDER — DEXAMETHASONE SODIUM PHOSPHATE 4 MG/ML
VIAL (ML) INJECTION AS NEEDED
Status: DISCONTINUED | OUTPATIENT
Start: 2024-08-27 | End: 2024-08-27 | Stop reason: SURG

## 2024-08-27 RX ORDER — NICOTINE POLACRILEX 4 MG
15 LOZENGE BUCCAL
Status: DISCONTINUED | OUTPATIENT
Start: 2024-08-27 | End: 2024-08-27 | Stop reason: HOSPADM

## 2024-08-27 RX ORDER — ACETAMINOPHEN 10 MG/ML
1000 INJECTION, SOLUTION INTRAVENOUS ONCE
Status: COMPLETED | OUTPATIENT
Start: 2024-08-27 | End: 2024-08-28

## 2024-08-27 RX ORDER — ONDANSETRON 2 MG/ML
4 INJECTION INTRAMUSCULAR; INTRAVENOUS EVERY 8 HOURS PRN
Status: DISCONTINUED | OUTPATIENT
Start: 2024-08-27 | End: 2024-08-28

## 2024-08-27 RX ORDER — SODIUM CHLORIDE, SODIUM LACTATE, POTASSIUM CHLORIDE, CALCIUM CHLORIDE 600; 310; 30; 20 MG/100ML; MG/100ML; MG/100ML; MG/100ML
INJECTION, SOLUTION INTRAVENOUS CONTINUOUS PRN
Status: DISCONTINUED | OUTPATIENT
Start: 2024-08-27 | End: 2024-08-27 | Stop reason: SURG

## 2024-08-27 RX ORDER — HYDROMORPHONE HYDROCHLORIDE 1 MG/ML
0.6 INJECTION, SOLUTION INTRAMUSCULAR; INTRAVENOUS; SUBCUTANEOUS EVERY 5 MIN PRN
Status: DISCONTINUED | OUTPATIENT
Start: 2024-08-27 | End: 2024-08-27 | Stop reason: HOSPADM

## 2024-08-27 RX ORDER — NALOXONE HYDROCHLORIDE 0.4 MG/ML
80 INJECTION, SOLUTION INTRAMUSCULAR; INTRAVENOUS; SUBCUTANEOUS AS NEEDED
Status: DISCONTINUED | OUTPATIENT
Start: 2024-08-27 | End: 2024-08-27 | Stop reason: HOSPADM

## 2024-08-27 RX ORDER — LIDOCAINE HYDROCHLORIDE 10 MG/ML
INJECTION, SOLUTION EPIDURAL; INFILTRATION; INTRACAUDAL; PERINEURAL AS NEEDED
Status: DISCONTINUED | OUTPATIENT
Start: 2024-08-27 | End: 2024-08-27 | Stop reason: HOSPADM

## 2024-08-27 RX ORDER — MORPHINE SULFATE 10 MG/ML
6 INJECTION, SOLUTION INTRAMUSCULAR; INTRAVENOUS EVERY 10 MIN PRN
Status: DISCONTINUED | OUTPATIENT
Start: 2024-08-27 | End: 2024-08-27 | Stop reason: HOSPADM

## 2024-08-27 RX ORDER — HYDROMORPHONE HYDROCHLORIDE 1 MG/ML
0.4 INJECTION, SOLUTION INTRAMUSCULAR; INTRAVENOUS; SUBCUTANEOUS EVERY 5 MIN PRN
Status: DISCONTINUED | OUTPATIENT
Start: 2024-08-27 | End: 2024-08-27 | Stop reason: HOSPADM

## 2024-08-27 RX ADMIN — DEXAMETHASONE SODIUM PHOSPHATE 4 MG: 4 MG/ML VIAL (ML) INJECTION at 19:28:00

## 2024-08-27 RX ADMIN — ONDANSETRON 4 MG: 2 INJECTION INTRAMUSCULAR; INTRAVENOUS at 19:28:00

## 2024-08-27 RX ADMIN — SODIUM CHLORIDE, SODIUM LACTATE, POTASSIUM CHLORIDE, CALCIUM CHLORIDE: 600; 310; 30; 20 INJECTION, SOLUTION INTRAVENOUS at 19:16:00

## 2024-08-27 RX ADMIN — LIDOCAINE HYDROCHLORIDE 25 MG: 10 INJECTION, SOLUTION EPIDURAL; INFILTRATION; INTRACAUDAL; PERINEURAL at 19:21:00

## 2024-08-27 RX ADMIN — SODIUM CHLORIDE, SODIUM LACTATE, POTASSIUM CHLORIDE, CALCIUM CHLORIDE: 600; 310; 30; 20 INJECTION, SOLUTION INTRAVENOUS at 19:45:00

## 2024-08-27 NOTE — ED QUICK NOTES
Mumtaz [address] used. Patient complains of left sided 9/10 abdominal pain for 2 days, approximately 11 weeks pregnant, experiencing nausea and vomiting. Was seen here 1 month ago for the same issue. Denies vaginal bleeding.

## 2024-08-27 NOTE — ED PROVIDER NOTES
The patient was endorsed pending MRI abdomen however pregnancy ultrasound showing no obvious IUP and possible ectopic.  Ongoing left-sided abdominal pain on my reevaluation.  I discussed with Dr. Garcia for OB/GYN consultation who came and saw the patient in the ED.  Plan for admission for D&C further observation.

## 2024-08-27 NOTE — ED INITIAL ASSESSMENT (HPI)
Patient ambulatory to ED with complaint of left lower abd pain. Denies any vaginal bleeding or discharge. States she is pregnant. LMP 06/10/2024. Has not seen OB.      Patient is AXOX4.

## 2024-08-27 NOTE — ED PROVIDER NOTES
Patient Seen in: [facility] Emergency Department      History     Chief Complaint   Patient presents with    Pregnancy Issues    Abdomen/Flank Pain     Stated Complaint: Abdominal pain    Subjective:   HPI    Patient presents emergency department complaining of left-sided abdominal pain with nausea and vomiting.  She states that she is approximately 11 weeks pregnant by dates.  There is no head injury, fever or chills.  There is no constipation or diarrhea.    Objective:   Past Medical History:    Antepartum anemia (HCC)    Diet controlled gestational diabetes mellitus (GDM) in third trimester (HCC)              Past Surgical History:   Procedure Laterality Date    Laparoscopic salpingostomy Right 06/01/2021                Social History     Socioeconomic History    Marital status: Single   Tobacco Use    Smoking status: Never    Smokeless tobacco: Never   Vaping Use    Vaping status: Never Used   Substance and Sexual Activity    Alcohol use: Never    Drug use: Never     Social Determinants of Health     Food Insecurity: No Food Insecurity (8/27/2024)    Food Insecurity     Food Insecurity: Never true   Transportation Needs: No Transportation Needs (8/27/2024)    Transportation Needs     Lack of Transportation: No   Housing Stability: Low Risk  (8/27/2024)    Housing Stability     Housing Instability: No              Review of Systems    Positive for stated Chief Complaint: Pregnancy Issues and Abdomen/Flank Pain    Other systems are as noted in HPI.  Constitutional and vital signs reviewed.      All other systems reviewed and negative except as noted above.    Physical Exam     ED Triage Vitals [08/27/24 1219]   /73   Pulse 82   Resp 18   Temp 98.4 °F (36.9 °C)   Temp src Temporal   SpO2 99 %   O2 Device None (Room air)       Current Vitals:   Vital Signs  BP: 98/64  Pulse: 58  Resp: 18  Temp: 98.3 °F (36.8 °C)  Temp src: Oral  MAP (mmHg): 72    Oxygen Therapy  SpO2: 99 %  O2 Device: None (Room  air)  Pulse Oximetry Type: Intermittent  Oximetry Probe Site Changed: No  Pulse Ox Probe Location: Left hand            Physical Exam  Vitals and nursing note reviewed.   Constitutional:       General: She is not in acute distress.     Appearance: She is well-developed.   HENT:      Head: Normocephalic.      Nose: Nose normal.      Mouth/Throat:      Mouth: Mucous membranes are moist.   Eyes:      Conjunctiva/sclera: Conjunctivae normal.   Cardiovascular:      Rate and Rhythm: Normal rate and regular rhythm.      Heart sounds: No murmur heard.  Pulmonary:      Effort: Pulmonary effort is normal. No respiratory distress.      Breath sounds: Normal breath sounds.   Abdominal:      General: There is no distension.      Palpations: Abdomen is soft.      Tenderness: There is abdominal tenderness in the left upper quadrant and left lower quadrant. There is no guarding or rebound.   Musculoskeletal:         General: No tenderness. Normal range of motion.      Cervical back: Normal range of motion and neck supple.   Skin:     General: Skin is warm and dry.      Findings: No rash.   Neurological:      Mental Status: She is alert and oriented to person, place, and time.               ED Course     Labs Reviewed   BASIC METABOLIC PANEL (8) - Abnormal; Notable for the following components:       Result Value    Glucose 135 (*)     All other components within normal limits   URINALYSIS WITH CULTURE REFLEX - Abnormal; Notable for the following components:    Leukocyte Esterase Urine 75 (*)     RBC Urine 3-5 (*)     Bacteria Urine Rare (*)     Squamous Epi. Cells Few (*)     All other components within normal limits   HCG, BETA SUBUNIT (QUANT PREGNANCY TEST) - Abnormal; Notable for the following components:    Hcg Quantitative 17,431.7 (*)     All other components within normal limits   HCG, BETA SUBUNIT (QUANT PREGNANCY TEST) - Abnormal; Notable for the following components:    Hcg Quantitative 9,522.3 (*)     All other  components within normal limits   URINALYSIS WITH CULTURE REFLEX - Abnormal; Notable for the following components:    Blood Urine 3+ (*)     Leukocyte Esterase Urine 25 (*)     WBC Urine 6-10 (*)     RBC Urine >10 (*)     Squamous Epi. Cells Few (*)     All other components within normal limits   POCT PREGNANCY URINE - Abnormal; Notable for the following components:    POCT Urine Pregnancy Positive (*)     All other components within normal limits   POCT GLUCOSE - Abnormal; Notable for the following components:    POC Glucose  105 (*)     All other components within normal limits   POCT GLUCOSE - Abnormal; Notable for the following components:    POC Glucose  133 (*)     All other components within normal limits   CBC WITH DIFFERENTIAL WITH PLATELET   SURGICAL PATHOLOGY TISSUE   RAINBOW DRAW LAVENDER   URINE CULTURE, ROUTINE          ED Course as of 24 0801  ------------------------------------------------------------  Time:  0759  Value: US KIDNEY/BLADDER (CPT=76770)  Comment: (Reviewed)              [facility]        Admission disposition: 2024  6:17 PM                    Medical Decision Making  Differential diagnosis considered for ectopic pregnancy, missed AB, colitis, kidney stone, pyelonephritis.    Problems Addressed:  Abdominal pain of unknown etiology: acute illness or injury  Missed  (HCC): acute illness or injury    Amount and/or Complexity of Data Reviewed  Labs: ordered. Decision-making details documented in ED Course.     Details: BC and chemistry panel.  Elevated beta-hCG at 17,000  Radiology: ordered and independent interpretation performed. Decision-making details documented in ED Course.     Details: Ultrasound shows missed AB.  Kidney ultrasound unremarkable  Discussion of management or test interpretation with external provider(s): Endorsed oncoming medical physician who made final disposition following results of ultrasound.    Risk  Decision regarding hospitalization.  Minor  surgery with no identified risk factors.        Disposition and Plan     Clinical Impression:  1. Abdominal pain of unknown etiology    2. Missed  (HCC)    3. Missed ab (HCC)         Disposition:  Admit  2024  6:17 pm    Follow-up:  Shira Garcia MD  [address] [place] [id number]  [phone]    Follow up in 3 week(s)            Medications Prescribed:  Discharge Medication List as of 2024 12:49 PM                            Hospital Problems       Present on Admission  Date Reviewed: 2024            ICD-10-CM Noted POA    * (Principal) Abdominal pain of unknown etiology R10.9 2024 Unknown    Missed  (HCC) O02.1 2024 Unknown

## 2024-08-27 NOTE — ED QUICK NOTES
Orders for admission, patient is aware of plan and ready to go upstairs. Any questions, please call ED RN Bradley at extension [phone].     Patient Covid vaccination status: Unvaccinated     COVID Test Ordered in ED: None    COVID Suspicion at Admission: N/A    Running Infusions:  None    Mental Status/LOC at time of transport: A&Ox4    Other pertinent information: Australian-speaking    CIWA score: N/A   NIH score:  N/A

## 2024-08-27 NOTE — H&P
[facility]  Obstetrics and Gynecology Consultation     Sandra Gomez Patient Status:  Emergency    1990 MRN [id number]   Location [facility] EMERGENCY DEPARTMENT Attending Fuentes Boyd MD   Hospital Day 0 PCP None Pcp     Reason for Consultation: abdominal pain, nonviable pregnancy      Subjective:     HPI: Sandra Gomez is a 34 year old  at 11w1d by LMP c/w 5w3d US who presented to ED for worsening LLQ pain. Pain is similar to when she presented to ED on . At that visit she was noted to be pregnant with an hcg of 56900 and US revealed a 5wk gestational sac with what appeared to be yolk sac but no fetal pole identified, no abnormal adnexal masses. She states this was a planned and desired pregnancy.  She denies any vaginal bleeding. States this LLQ pain started prior to pregnancy.   She denies trauma to abdomen or falls. No other urinary symptoms. States pain is worsened with movement and will radiate toward the front of her left leg.     She has not established prenatal care and did not follow up after her ED visit on .    Today her hcg is [address] and no IUP identified, no abnormal masses seen on US.    OB History:  OB History    Para Term  AB Living   4 2 2 0 1 2   SAB IAB Ectopic Multiple Live Births   0 0 1 0 1       Gyne History:     Patient's last menstrual period was 06/10/2024 (exact date).      Meds:none    All:  Allergies   Allergen Reactions    Diclofenac ANGIOEDEMA    Penicillins SHORTNESS OF BREATH       PMH:  Past Medical History:    Antepartum anemia (HCC)    Diet controlled gestational diabetes mellitus (GDM) in third trimester (HCC)       PSH:  Past Surgical History:   Procedure Laterality Date    Laparoscopic salpingostomy Right 2021       SH:  Social History     Socioeconomic History    Marital status: Single     Spouse name: Not on file    Number of children: Not on file    Years of education: Not on file     Highest education level: Not on file   Occupational History    Not on file   Tobacco Use    Smoking status: Never    Smokeless tobacco: Never   Vaping Use    Vaping status: Never Used   Substance and Sexual Activity    Alcohol use: Never    Drug use: Never    Sexual activity: Not on file   Other Topics Concern    Not on file   Social History Narrative    Not on file     Social Determinants of Health     Financial Resource Strain: Not on file   Food Insecurity: Not on file   Transportation Needs: Not on file   Stress: Not on file   Housing Stability: Not on file       FH:  Family History   Problem Relation Age of Onset    No Known Problems Father     No Known Problems Mother        Review of Systems:  General: no complaints  Eyes: no complaints  Respiratory: no complaints  Cardiovascular: no complaints  GI: denies abdominal pain, diarrhea, constipationSee HPI  : no complaints, denies dysuria, increased urinary frequencySee HPI  Hematological/lymphatic: no complaints  Breast: denies rashes, skin changes, pain, lumps or discharge   Psychiatric: no complaints  Endocrine:no complaints  Neurological: no complaints  Immunological: no complaints  Musculoskeletal:no complaints      Objective:     Vitals:    08/27/24 1715   BP: 109/59   Pulse: 66   Resp:    Temp:        GENERAL: well developed, well nourished, in no apparent distress, alert and orientated X 3  PSYCH: mood and affect stable   SKIN: no rashes, no lesions  ABDOMEN: Soft, non distended; mild left lateral abd pain, not near pelvis, no rebound or guarding, no mass, +carnett's sign  EXTREMITIES:  Normal range of motion, strength 5/5, nontender without edema    Labs:  Lab Results   Component Value Date    WBC 5.0 08/27/2024    HGB 13.6 08/27/2024    HCT 39.5 08/27/2024    .0 08/27/2024    CREATSERUM 0.63 08/27/2024    BUN 11 08/27/2024     08/27/2024    K 3.9 08/27/2024     08/27/2024    CO2 29.0 08/27/2024     08/27/2024    CA 9.4  2024       Imaging:      Impression   CONCLUSION:  1. Intrauterine fluid collection with a mean sac size of 2.15 cm giving an age of 7 weeks 0 days although there is no well-defined IUP.  There is no yolk sac or fetal pole identified.  With a beta HCG of 17,000, I would expect to see a viable IUP at this   time.  Differential diagnosis would include a blighted ovum versus sonographically occult ectopic pregnancy.  The right ovary is normal.  Probable corpus luteal cyst in the left ovary.  No large adnexal mass or free fluid.  Correlate clinically and with   gynecologic evaluation is needed        Assessment:     Sandra Gomez is a 34 year old  at 11w1d by LMP c/w 5w3d US who presented to ED for worsening LLQ pain and nonviable pregnancy.  Clinical hx and pelvic imaging/labs reviewed. She has decreasing hcg quant and no IUP. Suspected likely blighted ovum/missed . Very unlikely an ectopic pregnancy and suspect LLQ pain separate issue/possible confounder in this case. I recommend suction D&C and overnight observation to ensure pain is well controlled and repeat hcg next am to also ensure appropriate decrease and definitively r/o ectopic pregnancy. Discussed that in the rare case her pain is increased and not well controlled, may need diagnostic laparoscopy.  Pt and  understand and agree with plan of care.    Patient Active Problem List   Diagnosis    Supervision of normal pregnancy (HCC)    Lesion of ovary    History of ectopic pregnancy    Obesity affecting pregnancy, antepartum (HCC)    Hx of macrosomia in infant in prior pregnancy, currently pregnant (HCC)    Marginal insertion of umbilical cord affecting management of mother (HCC)    Proteinuria in pregnancy, antepartum (HCC)    Low serum ferritin level    Antepartum anemia (HCC)    Vaginal delivery (HCC)    Abdominal pain of unknown etiology         Plan:     We discussed the risks of the procedure, including but not limited to  infection, bleeding, damage to surrounding structures, uterine perforation, Asherman's syndrome, need for abdominal exploration or further surgery.  -add case  - OR aware  -NPO since 10a      Shira Garcia MD

## 2024-08-28 VITALS
RESPIRATION RATE: 18 BRPM | HEART RATE: 58 BPM | DIASTOLIC BLOOD PRESSURE: 64 MMHG | BODY MASS INDEX: 34.2 KG/M2 | SYSTOLIC BLOOD PRESSURE: 98 MMHG | OXYGEN SATURATION: 99 % | HEIGHT: 62.99 IN | TEMPERATURE: 98 F | WEIGHT: 193 LBS

## 2024-08-28 LAB
B-HCG SERPL-ACNC: 9522.3 MIU/ML
BILIRUB UR QL: NEGATIVE
CLARITY UR: CLEAR
GLUCOSE BLDC GLUCOMTR-MCNC: 133 MG/DL (ref 70–99)
GLUCOSE UR-MCNC: NORMAL MG/DL
KETONES UR-MCNC: NEGATIVE MG/DL
LEUKOCYTE ESTERASE UR QL STRIP.AUTO: 25
NITRITE UR QL STRIP.AUTO: NEGATIVE
PH UR: 5.5 [PH] (ref 5–8)
PROT UR-MCNC: NEGATIVE MG/DL
RBC #/AREA URNS AUTO: >10 /HPF
SP GR UR STRIP: 1.01 (ref 1–1.03)
UROBILINOGEN UR STRIP-ACNC: NORMAL

## 2024-08-28 PROCEDURE — 82962 GLUCOSE BLOOD TEST: CPT

## 2024-08-28 PROCEDURE — 84702 CHORIONIC GONADOTROPIN TEST: CPT | Performed by: STUDENT IN AN ORGANIZED HEALTH CARE EDUCATION/TRAINING PROGRAM

## 2024-08-28 PROCEDURE — 81001 URINALYSIS AUTO W/SCOPE: CPT | Performed by: STUDENT IN AN ORGANIZED HEALTH CARE EDUCATION/TRAINING PROGRAM

## 2024-08-28 RX ORDER — OXYCODONE HYDROCHLORIDE 5 MG/1
5 TABLET ORAL EVERY 6 HOURS PRN
Status: DISCONTINUED | OUTPATIENT
Start: 2024-08-28 | End: 2024-08-28

## 2024-08-28 NOTE — ANESTHESIA PREPROCEDURE EVALUATION
Anesthesia PreOp Note    HPI:     Sandra Gomez is a 34 year old female who presents for preoperative consultation requested by: Shira Garcia MD    Date of Surgery: 2024    Procedure(s):  DILATION & CURETTAGE SUCTION  Indication: Missed ab (HCC) [O02.1]    Relevant Problems   No relevant active problems       NPO:  Last Liquid Consumption Date: 24  Last Liquid Consumption Time: 1000  Last Solid Consumption Date: 24  Last Solid Consumption Time: 1000  Last Liquid Consumption Date: 24  NPO: Yes       History Review:  Patient Active Problem List    Diagnosis Date Noted    Abdominal pain of unknown etiology 2024    Missed  (HCC) 2024    Vaginal delivery ([facility]) 2022    Low serum ferritin level 2022    Antepartum anemia ([facility]) 2022    Proteinuria in pregnancy, antepartum ([facility])     Marginal insertion of umbilical cord affecting management of mother ([facility]) 2022    Obesity affecting pregnancy, antepartum ([facility]) 03/10/2022    Hx of macrosomia in infant in prior pregnancy, currently pregnant ([facility]) 03/10/2022    History of ectopic pregnancy 2022    Lesion of ovary     Supervision of normal pregnancy ([facility]) 2021       Past Medical History:    Antepartum anemia ([facility])    Diet controlled gestational diabetes mellitus (GDM) in third trimester ([facility])       Past Surgical History:   Procedure Laterality Date    Laparoscopic salpingostomy Right 2021       Facility-Administered Medications Prior to Admission   Medication Dose Route Frequency Provider Last Rate Last Admin    medroxyPROGESTERone Acetate ABBY 150 mg  150 mg Intramuscular Q3 Months Alan Baltazar MD   150 mg at 10/18/22 1006     Medications Prior to Admission   Medication Sig Dispense Refill Last Dose    Blood Glucose Monitoring Suppl (CONTOUR NEXT MONITOR) w/Device Does not apply Kit 1 Device in the morning, at noon, in the evening, and at bedtime. Ok to dispense kit covered  under patient's health plan. (Patient not taking: Reported on 3/22/2023) 1 kit 0     prenatal vitamin with DHA 27-0.8-228 MG Oral Cap Take 1 capsule by mouth daily. (Patient not taking: Reported on 3/22/2023)       Blood Glucose Monitoring Suppl (ONETOUCH VERIO FLEX SYSTEM) w/Device Does not apply Kit 1 kit 2 (two) times daily. May substitute per insurance formulary (Patient not taking: Reported on 3/22/2023) 1 kit 0     doxylamine-pyridoxine 10-10 MG Oral Tab EC Take 2 tablets by mouth 2 (two) times daily as needed. (Patient not taking: Reported on 3/22/2023) 90 tablet 0      Current Facility-Administered Medications Ordered in Epic   Medication Dose Route Frequency Provider Last Rate Last Admin    doxycycline hyclate (Vibramycin) 200 mg in sodium chloride 0.9% 250 mL IVPB  200 mg Intravenous Once Shira Garcia MD        lactated ringers IV bolus 1,000 mL  1,000 mL Intravenous Once Fuentes Boyd MD 1,000 mL/hr at 08/27/24 1844 1,000 mL at 08/27/24 1844     No current [facility]-ordered outpatient medications on file.       Allergies   Allergen Reactions    Diclofenac ANGIOEDEMA    Penicillins SHORTNESS OF BREATH       Family History   Problem Relation Age of Onset    No Known Problems Father     No Known Problems Mother      Social History     Socioeconomic History    Marital status: Single   Tobacco Use    Smoking status: Never    Smokeless tobacco: Never   Vaping Use    Vaping status: Never Used   Substance and Sexual Activity    Alcohol use: Never    Drug use: Never       Available pre-op labs reviewed.  Lab Results   Component Value Date    WBC 5.0 08/27/2024    RBC 4.50 08/27/2024    HGB 13.6 08/27/2024    HCT 39.5 08/27/2024    MCV 87.8 08/27/2024    MCH 30.2 08/27/2024    MCHC 34.4 08/27/2024    RDW 12.8 08/27/2024    .0 08/27/2024    HCGQN 17,431.7 (H) 08/27/2024    URINEPREG Positive (A) 08/27/2024     Lab Results   Component Value Date     08/27/2024    K 3.9 08/27/2024      08/27/2024    CO2 29.0 08/27/2024    BUN 11 08/27/2024    CREATSERUM 0.63 08/27/2024     (H) 08/27/2024    CA 9.4 08/27/2024          Vital Signs:  Body mass index is 32.78 kg/m².   height is 1.6 m (5' 2.99\") and weight is 83.9 kg (185 lb). Her oral temperature is 98 °F (36.7 °C). Her blood pressure is 105/62 and her pulse is 84. Her respiration is 16 and oxygen saturation is 100%.   Vitals:    08/27/24 1330 08/27/24 1400 08/27/24 1715 08/27/24 1849   BP: 109/64 98/57 109/59 105/62   Pulse: 72 67 66 84   Resp:    16   Temp:    98 °F (36.7 °C)   TempSrc:    Oral   SpO2: 100% 100% 100% 100%   Weight:   83.9 kg (185 lb)    Height:   1.6 m (5' 2.99\")         Anesthesia Evaluation     Patient summary reviewed and Nursing notes reviewed    Airway   Mallampati: II  TM distance: >3 FB  Neck ROM: full  Dental      Pulmonary - negative ROS and normal exam    breath sounds clear to auscultation  Cardiovascular - negative ROS and normal exam    Rhythm: regular  Rate: normal    Neuro/Psych - negative ROS     GI/Hepatic/Renal - negative ROS     Endo/Other    (+) diabetes mellitus  Abdominal                  Anesthesia Plan:   ASA:  2  Plan:   General  Airway:  LMA  Post-op Pain Management: IV analgesics  Informed Consent Plan and Risks Discussed With:  Patient  Consent Comment:  line      I have informed Sandra Gomez and/or legal guardian or family member of the nature of the anesthetic plan, benefits, risks including possible dental damage if relevant, major complications, and any alternative forms of anesthetic management.   All of the patient's questions were answered to the best of my ability. The patient desires the anesthetic management as planned.  YOLY MAYO MD  8/27/2024 7:02 PM  Present on Admission:  **None**

## 2024-08-28 NOTE — OPERATIVE REPORT
[facility]  part of [facility]    Operative Note         Sandra Gomez Location: OR   [facility] [id number] MRN [id number]   Admission Date 2024 Operation Date 2024   Attending Physician Shira Garcia MD       Patient Name: Sandra Gomez     Preoperative Diagnosis: Missed ab (HCC) [O02.1]     Postoperative Diagnosis: Missed ab (HCC) [O02.1]     Procedure(s):  DILATION & CURETTAGE SUCTION     Primary Surgeon: Shira Garcia MD, MD           Anesthesia: General     Specimen:   ID Type Source Tests Collected by Time Destination   1 : 1. products of conception Tissue Products of conception SURGICAL PATHOLOGY TISSUE Shira Garcia MD 2024  7:35 PM         Estimated Blood Loss: 15cc    Complications: none      Indications for procedure: missed      Surgical Findings: normal appearing external genitalia, 10wk size uterus, products of conception. Endometrial sampling was reviewed and gross appearance of chorionic villi floating in normal saline was identified       Operative Summary:    After obtaining consent, the patient was taken to the operative suite where she was administered general anesthesia with a laryngeal mask airway.  Patient was then prepped and draped in a sterile fashion.  A time-out was performed.    A bivalve speculum was placed in the vagina.  The cervix was grasped with a tenaculum and a paracervical block with 1% lidocaine was performed. The cervix was then serially dilated.  A 8mm [place]ij[place] curved suction catheter was then introduced into the uterus, and suction curettage was then performed until a gritty texture was noted. All tissue was sent for pathologic evaluation.      Drapes were then removed, and the tenaculum was removed from the cervix. Tenaculum sites were hemostatic.    Patient tolerated the procedure well.  She was transferred to recovery room in stable condition.  Blood loss was estimated at  15 mL, and there were no immediate  complications.       Implants: * No implants in log *     Drains: none     Condition: stable       Shira Garcia MD

## 2024-08-28 NOTE — PLAN OF CARE
Vitals within normal limits. Room Air. Some abdominal cramping - per MD expected and normal. PRN Motrin given for pain control.   UA with culture sent.   Discharge instructions and follow ups discussed with patient, she verbalized understanding.   IV discontinued. Home with .

## 2024-08-28 NOTE — PROGRESS NOTES
Sandra Gomez is a 34 year old female  Patient's last menstrual period was 06/10/2024 (exact date).   Chief Complaint   Patient presents with    Pregnancy Issues    Abdomen/Flank Pain     Pt is POD 1 s/p suction D&C. Pt has left abdominal pain.   OBSTETRICS HISTORY:     OB History    Para Term  AB Living   4 2 2 0 1 2   SAB IAB Ectopic Multiple Live Births       1 0 1      # Outcome Date GA Lbr Juventino/2nd Weight Sex Type Anes PTL Lv   4 Current            3 Term 23 38w5d 01:57 / 00:07 8 lb 12 oz (3.97 kg) M NORMAL SPONT None N ZOË      Complications: Precipitous labor   2 Ectopic 20 5w0d          1 Term 13 40w0d  10 lb 4 oz (4.649 kg) F Vag-Spont          GYNE HISTORY:         No data recorded      Latest Ref Rng & Units 3/22/2023    12:22 PM 3/10/2022     3:14 PM   RECENT PAP RESULTS   Thinprep Pap Negative for intraepithelial lesion or malignancy Negative for intraepithelial lesion or malignancy  Negative for intraepithelial lesion or malignancy    HPV Negative Negative  Negative          MEDICAL HISTORY:     Past Medical History:    Antepartum anemia (HCC)    Diet controlled gestational diabetes mellitus (GDM) in third trimester (HCC)       SURGICAL HISTORY:     Past Surgical History:   Procedure Laterality Date    Laparoscopic salpingostomy Right 2021       SOCIAL HISTORY:     Social History     Socioeconomic History    Marital status: Single   Tobacco Use    Smoking status: Never    Smokeless tobacco: Never   Vaping Use    Vaping status: Never Used   Substance and Sexual Activity    Alcohol use: Never    Drug use: Never     Social Determinants of Health     Food Insecurity: No Food Insecurity (2024)    Food Insecurity     Food Insecurity: Never true   Transportation Needs: No Transportation Needs (2024)    Transportation Needs     Lack of Transportation: No   Housing Stability: Low Risk  (2024)    Housing Stability     Housing Instability: No         FAMILY HISTORY:     Family History   Problem Relation Age of Onset    No Known Problems Father     No Known Problems Mother        MEDICATIONS:     No current outpatient medications on file.    ALLERGIES:       Allergies   Allergen Reactions    Diclofenac ANGIOEDEMA    Penicillins SHORTNESS OF BREATH         REVIEW OF SYSTEMS:     Constitutional:    denies fever / chills  Eyes:     denies blurred or double vision  Cardiovascular:  denies chest pain or palpitations  Respiratory:    denies shortness of breath  Gastrointestinal:  denies severe abdominal pain, frequent diarrhea or constipation, nausea / vomiting  Genitourinary:    denies dysuria, bothersome incontinence  Skin/Breast:   denies any breast pain, lumps, or discharge  Neurological:    denies frequent severe headaches  Psychiatric:   denies depression or anxiety, thoughts of harming self or others  Heme/Lymph:    denies easy bruising or bleeding      PHYSICAL EXAM:   Blood pressure 98/64, pulse 58, temperature 98.3 °F (36.8 °C), temperature source Oral, resp. rate 18, height 5' 2.99\" (1.6 m), weight 193 lb (87.5 kg), last menstrual period 06/10/2024, SpO2 99%, not currently breastfeeding.  Constitutional:  well developed, well nourished  Head/Face:  normocephalic  Neck/Thyroid: thyroid symmetric, no thyromegaly, no nodules, no adenopathy  Lymphatic: no abnormal supraclavicular or axillary adenopathy is noted    Abdomen:   soft, mildly tender over left external oblique muscle (superficial),  nondistended, no masses,no rebound/guard  No CVA tenderness bilaterally  Skin/Hair:  no unusual rashes or bruises  Extremities:  no edema, no cyanosis, non tender bilaterally  Psychiatric:   oriented to time, place, person and situation. Appropriate mood and affect      ASSESSMENT & PLAN:     A: s/p suction D&C for missed . Hcg has come down appropriately overnight c/w intrauterine demise history  Left abdominal pain- this is musculoskeletal in origin.  Heat/ice/motrin recommended.   Discharge home. F/u in office.       FOLLOW-UP     2- 4 weeks for postop      Torey Murray MD  8/28/2024

## 2024-08-28 NOTE — PLAN OF CARE
Patient received from PACU s/p D&C. Alert & oriented x4, [place] speaking. On room air. Vitals stable. PRN motrin for pain. PRN oxy ordered this AM. Voiding with red tinged urine & complained of burning. MD notified & order to strain urine & with UA ordered. Up independently. Will continue to monitor.         Problem: Patient Centered Care  Goal: Patient preferences are identified and integrated in the patient's plan of care  Description: Interventions:  - What would you like us to know as we care for you?   - Provide timely, complete, and accurate information to patient/family  - Incorporate patient and family knowledge, values, beliefs, and cultural backgrounds into the planning and delivery of care  - Encourage patient/family to participate in care and decision-making at the level they choose  - Honor patient and family perspectives and choices  Outcome: Progressing     Problem: Patient/Family Goals  Goal: Patient/Family Long Term Goal  Description: Patient's Long Term Goal:     Interventions:  -   - See additional Care Plan goals for specific interventions  Outcome: Progressing  Goal: Patient/Family Short Term Goal  Description: Patient's Short Term Goal:     Interventions:   -  - See additional Care Plan goals for specific interventions  Outcome: Progressing

## 2024-08-28 NOTE — ANESTHESIA POSTPROCEDURE EVALUATION
Patient: Sandra Gomez    Procedure Summary       Date: 08/27/24 Room / Location: [facility] MAIN OR  / [facility] MAIN OR    Anesthesia Start: 1916 Anesthesia Stop: 1952    Procedure: DILATION & CURETTAGE SUCTION (Vagina ) Diagnosis:       Missed ab (HCC)      (Missed ab (HCC) [O02.1])    Surgeons: Shira Garcia MD Anesthesiologist: Yoly Wallis MD    Anesthesia Type: general ASA Status: 2            Anesthesia Type: general    Vitals Value Taken Time   /68 08/27/24 1951   Temp 98.2 °F (36.8 °C) 08/27/24 1950   Pulse 70 08/27/24 1951   Resp 15 08/27/24 1951   SpO2 96 08/27/24 1952   Vitals shown include unfiled device data.    [facility] AN Post Evaluation:   Patient Evaluated in PACU  Patient Participation: complete - patient participated  Level of Consciousness: awake and alert  Pain Management: adequate  Airway Patency:patent  Dental exam unchanged from preop  Yes    Nausea/Vomiting: none  Cardiovascular Status: acceptable  Respiratory Status: acceptable  Postoperative Hydration acceptable      YOLY WALLIS MD  8/27/2024 7:52 PM

## 2024-08-28 NOTE — ANESTHESIA PROCEDURE NOTES
Airway  Date/Time: 8/27/2024 7:22 PM  Urgency: Elective      General Information and Staff    Patient location during procedure: OR  Anesthesiologist: Devonte Wallis MD  Performed: anesthesiologist   Performed by: Devonte Wallis MD  Authorized by: Devonte Wallis MD      Indications and Patient Condition  Indications for airway management: anesthesia  Sedation level: deep  Preoxygenated: yes  Patient position: sniffing  Mask difficulty assessment: 1 - vent by mask    Final Airway Details  Final airway type: supraglottic airway      Successful airway: classic  Size 3       Number of attempts at approach: 1

## 2024-08-29 ENCOUNTER — TELEPHONE (OUTPATIENT)
Dept: OBGYN CLINIC | Facility: CLINIC | Age: 34
End: 2024-08-29

## 2024-08-29 NOTE — TELEPHONE ENCOUNTER
Patient is calling asking for a note to stay out of work till 9/9 . Patient said seen Dr Elie grace in hospital   S[[facility] speaking

## 2024-09-02 DIAGNOSIS — O02.0 MOLAR PREGNANCY (HCC): Primary | ICD-10-CM

## 2024-09-04 NOTE — TELEPHONE ENCOUNTER
Chief Complaint   Patient presents with   •  CAD       Subjective:   Yuri De León is a 76 y.o. male who presents today for follow-up evaluation of CAD, NSTEMI, RCA stent, chronic fibrillation, chronic anticoagulation, hypertension and hyperlipidemia.    Last seen on 8/22/2019.    Since 8/22/2019 the patient's had no cardiac problems or symptoms.  No bleeding problems.  Tolerating medications.    Since 2/5/2019 patient has had no cardiac problems.  In 6/2019 hospitalized after a fall injuring his right knee, resultant cellulitis unresponsive to outpatient oral antibiotics with Keflex requiring hospitalization and IV antibiotics x5 days.  Received IV fluids and during his hospitalization with subsequent significant lower extremity edema responsive to brief course of Lasix and potassium.    Past medical history  Since his coronary intervention he has had no cardiac symptoms.  He is completed 1 month of triple drug therapy and currently on warfarin and Prasugrel.    Past Medical History:   Diagnosis Date   • Abnormal electrocardiogram 8/13/2010   • Arrhythmia     Atrial Fibrillation; Cardiologist, Dr. Sorensen   • Arthritis     knees, left hip   • ASTHMA     inhalers   • Atrial fibrillation (HCC) 10/25/2011   • Bronchospasm 8/6/2009   • Diabetes (HCC)     insulin   • HTN (hypertension) 10/25/2011   • Hypercholesteremia 10/25/2011   • Long term (current) use of anticoagulants 10/25/2011   • NASAL POLYPS 8/6/2009   • Other nonspecific abnormal finding 8/6/2009   • Pain     left hip   • Shortness of breath    • Sleep apnea 8/6/2009    o2 at night   • Wears glasses      Past Surgical History:   Procedure Laterality Date   • KNEE ARTHROPLASTY TOTAL Left 10/2014   • HIP ARTHROPLASTY TOTAL  8/31/2010    Performed by OSGOOD, PATRICK J at SURGERY [facility] ORS   • LUMBAR DECOMPRESSION  1984   • HIP REPLACEMENT, TOTAL     • LAMINOTOMY     • SINUSCOPE     • SINUSOTOMIES  2003,2005,2/2007     Family History   Problem  Patient verified name and     Patient states letter has been received, no longer in need.    Relation Age of Onset   • Heart Disease Mother      Social History     Socioeconomic History   • Marital status:      Spouse name: Not on file   • Number of children: Not on file   • Years of education: Not on file   • Highest education level: Not on file   Occupational History   • Not on file   Social Needs   • Financial resource strain: Not on file   • Food insecurity     Worry: Not on file     Inability: Not on file   • Transportation needs     Medical: Not on file     Non-medical: Not on file   Tobacco Use   • Smoking status: Former Smoker     Packs/day: 0.50     Years: 10.00     Pack years: 5.00     Types: Cigarettes     Last attempt to quit: 12/10/1968     Years since quittin.5   • Smokeless tobacco: Never Used   Substance and Sexual Activity   • Alcohol use: No   • Drug use: No   • Sexual activity: Not on file   Lifestyle   • Physical activity     Days per week: Not on file     Minutes per session: Not on file   • Stress: Not on file   Relationships   • Social connections     Talks on phone: Not on file     Gets together: Not on file     Attends Yazidism service: Not on file     Active member of club or organization: Not on file     Attends meetings of clubs or organizations: Not on file     Relationship status: Not on file   • Intimate partner violence     Fear of current or ex partner: Not on file     Emotionally abused: Not on file     Physically abused: Not on file     Forced sexual activity: Not on file   Other Topics Concern   • Not on file   Social History Narrative   • Not on file     Allergies   Allergen Reactions   • Atenolol Shortness of Breath     DYSPNEA.   • Tetanus Toxoid Swelling     Outpatient Encounter Medications as of 2020   Medication Sig Dispense Refill   • warfarin (COUMADIN) 5 MG Tab TAKE ONE TABLET BY MOUTH ONE TIME DAILY OR AS DIRECTED BY COUMADIN CLINIC 90 Tab 2   • SYMBICORT 160-4.5 MCG/ACT Aerosol INHALE 2 PUFFS BY MOUTH 2 TIMES A DAY. USE SPACER. RINSE MOUTH  AFTER EACH USE. 3 Inhaler 3   • albuterol (VENTOLIN HFA) 108 (90 Base) MCG/ACT Aero Soln inhalation aerosol Inhale 2 Puffs by mouth every 6 hours as needed for Shortness of Breath.     • albuterol (PROVENTIL) 2.5mg/3ml Nebu Soln solution for nebulization 2.5 mg by Nebulization route every four hours as needed for Shortness of Breath.     • furosemide (LASIX) 40 MG Tab Take 40 mg by mouth 1 time daily as needed.     • potassium chloride (MICRO-K) 10 MEQ capsule Take 10 mEq by mouth 1 time daily as needed.     • TRUEPLUS PEN NEEDLES 31G X 6 MM Misc USE 2 TO 3 NEEDLES PER DAY  5   • NOVOLOG FLEXPEN 100 UNIT/ML solution for injection INJECT 5-10 UNITS THREE TIMES A DAY AS NEEDED  11   • clopidogrel (PLAVIX) 75 MG Tab Take 1 Tab by mouth every day. 90 Tab 3   • insulin detemir (LEVEMIR FLEXTOUCH) 100 UNIT/ML Solution Pen-injector injection Inject 14 Units as instructed every evening.     • insulin lispro (HUMALOG) 100 UNIT/ML Solution Inject 5-10 Units as instructed as needed for High Blood Sugar (Only if blood sugar is 180 or higher).     • acetaminophen (TYLENOL) 500 MG Tab Take 1,000 mg by mouth every 6 hours as needed for Moderate Pain.     • budesonide-formoterol (SYMBICORT) 160-4.5 MCG/ACT Aerosol Inhale 2 Puffs by mouth 2 Times a Day. Use spacer. Rinse mouth after each use. (Patient taking differently: Inhale 1 Puff by mouth 2 Times a Day. Use spacer. Rinse mouth after each use.) 3 Inhaler 3   • DILTIAZem (CARDIZEM) 60 MG Tab Take 1 Tab by mouth 3 times a day. 90 Tab 0   • amoxicillin (AMOXIL) 500 MG Cap      • amLODIPine (NORVASC) 10 MG Tab Take 1 Tab by mouth every day. 30 Tab 1   • montelukast (SINGULAIR) 10 MG Tab Take 10 mg by mouth every evening.     • metoprolol SR (TOPROL XL) 25 MG TABLET SR 24 HR Take 1 Tab by mouth every day. (Patient not taking: Reported on 7/1/2020) 90 Tab 2   • atorvastatin (LIPITOR) 80 MG tablet TAKE ONE TABLET BY MOUTH EVERY DAY (Patient not taking: Reported on 7/1/2020) 90 Tab 3  "    No facility-administered encounter medications on file as of 7/1/2020.      Review of Systems   Respiratory: Negative for cough and shortness of breath.    Cardiovascular: Negative for chest pain and palpitations.   Musculoskeletal: Negative for myalgias.   Neurological: Negative for dizziness and loss of consciousness.        Objective:   /92 (BP Location: Left arm, Patient Position: Sitting, BP Cuff Size: Adult)   Pulse 88   Ht 1.803 m (5' 11\")   Wt 112 kg (247 lb)   SpO2 92%   BMI 34.45 kg/m²     Physical Exam   Constitutional: He is oriented to person, place, and time. He appears well-developed and well-nourished.   Eyes: Pupils are equal, round, and reactive to light. EOM are normal.   Neck: No JVD present.   Cardiovascular: Normal rate, normal heart sounds and intact distal pulses. An irregularly irregular rhythm present.   No murmur heard.  Pulmonary/Chest: Effort normal and breath sounds normal. No respiratory distress. He has no wheezes. He has no rales.   Increased AP diameter.   Abdominal:   Overweight.   Musculoskeletal:         General: No edema.      Comments: Mild edema.   Neurological: He is alert and oriented to person, place, and time.   Skin: Skin is warm and dry.   Psychiatric: He has a normal mood and affect. His behavior is normal.     CARDIAC CATHETERIZATION 08/15/2018  A.  Left heart catheterization.  B.  Left ventriculography.  C.  Selective coronary angiography.  D.  Coronary stent implantation of the mid right coronary artery with   overlapping stents: 4.5x18 mm Ultra non-drug eluting stent and 4.0x38 mm Xience   Gianna drug-eluting stent   E.  Right radial artery approach.   PREOPERATIVE DIAGNOSES:  1.  Unstable angina pectoris.  2.  Abnormal myocardial perfusion scan with inferior perfusion abnormality.  3.  Chronic atrial fibrillation.  4.  Chronic anticoagulation with warfarin.  5.  Diabetes mellitus.   POSTOPERATIVE DIAGNOSES:  1.  Coronary artery disease, 3-vessel, " involving the dominant mid right   coronary artery, distal circumflex artery and diagonal branch ostium.  2.  Left ventricular ejection fraction 60%.    ECHOCARDIOGRAM   No prior study is available for comparison.   Mild concentric left ventricular hypertrophy.  Normal left ventricular systolic function.  Left ventricular ejection fraction is visually estimated to be 60%.  Diastolic function is difficult to assess with atrial fibrillation.  Severely dilated left atrium.  Estimated right ventricular systolic pressure is 37 mmHg + JVP.    Assessment:     1. Coronary artery disease, occlusive     2. S/P coronary artery stent placement     3. Atrial fibrillation, chronic (HCC)     4. Essential hypertension, benign     5. Dyslipidemia  Comp Metabolic Panel    LIPID PANEL       Medical Decision Making:  Today's Assessment / Status / Plan:     Assessment  1.  CAD.  Clinically stable.  2.  RCA stent 8/15/2018  3.  Atrial fibrillation.  Chronic.  Asymptomatic, rate controlled.  4.  Anticoagulation.  On warfarin.  5.  Hypertension.    6.  Dyslipidemia.  On atorvastatin  7.  Cellulitis.  Right knee.  6/2019.    Recommendation Discussion  1.  The patient is stable from a cardiac standpoint concerning his CAD, atrial fibrillation and anticoagulation.  2.  INR is consistently therapeutic.  3.  BP elevated today instructed patient to maintain a daily blood pressure log and if BP not consistently in the 130s/70s range he is to contact the office.  4.  Follow-up lipid panel.  5.  Continue current cardiac therapy.  6.  RTC 6 months.

## 2024-09-09 ENCOUNTER — TELEPHONE (OUTPATIENT)
Dept: OBGYN CLINIC | Facility: CLINIC | Age: 34
End: 2024-09-09

## 2024-09-09 NOTE — TELEPHONE ENCOUNTER
Patient verified name and     Patient scheduled for post op appointment . Aware of scheduling details.

## 2024-09-11 ENCOUNTER — OFFICE VISIT (OUTPATIENT)
Dept: OBGYN CLINIC | Facility: CLINIC | Age: 34
End: 2024-09-11

## 2024-09-11 VITALS
SYSTOLIC BLOOD PRESSURE: 112 MMHG | HEART RATE: 76 BPM | DIASTOLIC BLOOD PRESSURE: 73 MMHG | WEIGHT: 192 LBS | BODY MASS INDEX: 34 KG/M2

## 2024-09-11 DIAGNOSIS — Z09 POSTOP CHECK: Primary | ICD-10-CM

## 2024-09-11 PROCEDURE — 99213 OFFICE O/P EST LOW 20 MIN: CPT | Performed by: STUDENT IN AN ORGANIZED HEALTH CARE EDUCATION/TRAINING PROGRAM

## 2024-09-11 NOTE — PROGRESS NOTES
[facility]  Obstetrics and Gynecology  Focused Gynecology Problem Exam      Sandra Gomez is a 34 year old female presenting for Post-Op  .    HPI:     Chief Complaint   Patient presents with    Post-Op     S/p D&C on  for missed .  No longer having pain. Minimal bleeding.   Would like future pregnancy.  Reviewed pathology, possible molar pregnancy  Will do hcg to confirm neg.    No data recorded    Medications (Active prior to today's visit):  Current Outpatient Medications   Medication Sig Dispense Refill    Blood Glucose Monitoring Suppl (CONTOUR NEXT MONITOR) w/Device Does not apply Kit 1 Device in the morning, at noon, in the evening, and at bedtime. Ok to dispense kit covered under patient's health plan. (Patient not taking: Reported on 2024) 1 kit 0    prenatal vitamin with DHA 27-0.8-228 MG Oral Cap Take 1 capsule by mouth daily. (Patient not taking: Reported on 2024)      Blood Glucose Monitoring Suppl (ONETOUCH VERIO FLEX SYSTEM) w/Device Does not apply Kit 1 kit 2 (two) times daily. May substitute per insurance formulary (Patient not taking: Reported on 2024) 1 kit 0     Allergies:  Allergies   Allergen Reactions    Diclofenac ANGIOEDEMA    Penicillins SHORTNESS OF BREATH     HISTORY:     OB History    Para Term  AB Living   4 2 2 0 1 2   SAB IAB Ectopic Multiple Live Births       1 0 1      # Outcome Date GA Lbr Juventino/2nd Weight Sex Type Anes PTL Lv   4             3 Term 22 38w5d 01:57 / 00:07 8 lb 12 oz (3.97 kg) M NORMAL SPONT None N ZOË      Complications: Precipitous labor   2 Ectopic 20 5w0d          1 Term 13 40w0d  10 lb 4 oz (4.649 kg) F Vag-Spont            Past Medical History:    Antepartum anemia (HCC)    Diet controlled gestational diabetes mellitus (GDM) in third trimester (HCC)       Past Surgical History:   Procedure Laterality Date    Laparoscopic salpingostomy Right 2021       Family History   Problem Relation Age  of Onset    No Known Problems Father     No Known Problems Mother        Social History     Socioeconomic History    Marital status: Single     Spouse name: Not on file    Number of children: Not on file    Years of education: Not on file    Highest education level: Not on file   Occupational History    Not on file   Tobacco Use    Smoking status: Never    Smokeless tobacco: Never   Vaping Use    Vaping status: Never Used   Substance and Sexual Activity    Alcohol use: Never    Drug use: Never    Sexual activity: Not on file   Other Topics Concern    Not on file   Social History Narrative    Not on file     Social Determinants of Health     Financial Resource Strain: Not on file   Food Insecurity: No Food Insecurity (8/27/2024)    Food Insecurity     Food Insecurity: Never true   Transportation Needs: No Transportation Needs (8/27/2024)    Transportation Needs     Lack of Transportation: No     Car Seat: Not on file   Physical Activity: Not on file   Stress: Not on file   Social Connections: Not on file   Housing Stability: Low Risk  (8/27/2024)    Housing Stability     Housing Instability: No     Housing Instability Emergency: Not on file     Crib or Bassinette: Not on file       ROS:   Review of Systems:    Constitutional:    denies fever / chills  Eyes:     denies blurred or double vision  Cardiovascular:  denies chest pain or palpitations  Respiratory:    denies shortness of breath  Gastrointestinal:  denies severe abdominal pain, frequent diarrhea or constipation, nausea / vomiting  Genitourinary:    denies dysuria, bothersome incontinence  Skin/Breast:   denies any breast pain, lumps, or discharge  Neurological:    denies frequent severe headaches  Psychiatric:   denies depression or anxiety, thoughts of harming self or others  Heme/Lymph:    denies easy bruising or bleeding  PHYSICAL EXAM:   /73   Pulse 76   Wt 192 lb (87.1 kg)   LMP 06/10/2024 (Exact Date)   Breastfeeding Unknown   BMI 34.02 kg/m²      GENERAL: well developed, well nourished, in no apparent distress  ABDOMEN: Soft, non distended; non tender, no masses       ASSESSMENT:          ICD-10-CM    1. Postop check  Z09           PLAN:   Appropriate recovery  Will check hcg  Recommend avoiding pregnancy at this time pending hcg. If hcg neg, recommend one normal menstrual cycle and then may attempt to conceive.  Rtc prn    ORDERS:   No orders of the defined types were placed in this encounter.    PRESCRIPTIONS:     Requested Prescriptions      No prescriptions requested or ordered in this encounter     IMAGING/ REFERRALS:    None     Shira Garcia MD  9/11/2024  3:55 PM

## 2024-09-12 ENCOUNTER — TELEPHONE (OUTPATIENT)
Dept: OBGYN CLINIC | Facility: CLINIC | Age: 34
End: 2024-09-12

## 2024-09-13 ENCOUNTER — TELEPHONE (OUTPATIENT)
Dept: OBGYN UNIT | Facility: HOSPITAL | Age: 34
End: 2024-09-13

## 2024-09-18 NOTE — TELEPHONE ENCOUNTER
Patient calling to get status of [facility] paperwork.    Patient employer is asking for this to be faxed to them and it should be on the paperwork.  Patient has already left a payment for this.    Pls advise

## 2024-09-22 NOTE — TELEPHONE ENCOUNTER
Rcvd Disab Forms from [facility] for Dr Garcia in Forms Dept. [employer] rcvd but missing fax # on [employer]. Kewen message sent to patient for fax#. Forms logged for processing.

## 2024-09-24 NOTE — TELEPHONE ENCOUNTER
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 Garcia, 9/24/24    -Click Acknowledge located in the top right ribbon/menu   -Drag the mouse into the blank space of the document and a + sign will appear. Left click to   electronically sign the document.  -Once signed, simply exit out of the screen and you signature will be saved.     Thank you,  Carolina YEAGER

## 2024-09-25 NOTE — TELEPHONE ENCOUNTER
Forms completed and uploaded to [employer] due to no completed Release of Information on file.

## 2025-04-29 ENCOUNTER — TELEPHONE (OUTPATIENT)
Dept: OBGYN CLINIC | Facility: CLINIC | Age: 35
End: 2025-04-29

## 2025-04-29 NOTE — TELEPHONE ENCOUNTER
Pt name and  verified. Pt calling to establish care.     Lmp:  9w3d  +hpt: 15 days ago  Cycles: regular    Pt scheduled for new OB on  with Dr. Nogueira. Pt aware of scheduling details.

## 2025-05-05 ENCOUNTER — TELEPHONE (OUTPATIENT)
Dept: OBGYN CLINIC | Facility: CLINIC | Age: 35
End: 2025-05-05

## 2025-05-07 ENCOUNTER — HOSPITAL ENCOUNTER (OUTPATIENT)
Dept: ULTRASOUND IMAGING | Facility: HOSPITAL | Age: 35
Discharge: HOME | End: 2025-05-07
Attending: OBSTETRICS & GYNECOLOGY
Payer: MEDICAID

## 2025-05-07 ENCOUNTER — INITIAL PRENATAL (OUTPATIENT)
Dept: OBGYN CLINIC | Facility: CLINIC | Age: 35
End: 2025-05-07

## 2025-05-07 VITALS — BODY MASS INDEX: 37 KG/M2 | DIASTOLIC BLOOD PRESSURE: 69 MMHG | WEIGHT: 209.81 LBS | SYSTOLIC BLOOD PRESSURE: 107 MMHG

## 2025-05-07 DIAGNOSIS — Z34.81 ENCOUNTER FOR SUPERVISION OF OTHER NORMAL PREGNANCY IN FIRST TRIMESTER (HCC): Primary | ICD-10-CM

## 2025-05-07 DIAGNOSIS — Z36.9: ICD-10-CM

## 2025-05-07 PROCEDURE — 76817 TRANSVAGINAL US OBSTETRIC: CPT | Performed by: OBSTETRICS & GYNECOLOGY

## 2025-05-07 PROCEDURE — 76801 OB US < 14 WKS SINGLE FETUS: CPT | Performed by: OBSTETRICS & GYNECOLOGY

## 2025-05-07 NOTE — PROGRESS NOTES
Sandra Gomez is a 34 year old female  Patient's last menstrual period was 2025.   Chief Complaint   Patient presents with    Prenatal Care       OBSTETRICS HISTORY:     OB History    Para Term  AB Living   5 2 2 0 1 2   SAB IAB Ectopic Multiple Live Births     1 0 1      # Outcome Date GA Lbr Juventino/2nd Weight Sex Type Anes PTL Lv   5 Current            4 Term 22 38w5d 01:57 / 00:07 8 lb 12 oz (3.97 kg) M NORMAL SPONT None N ZOË      Complications: Precipitous labor   3 Ectopic 20 5w0d          2 Term 13 40w0d  10 lb 4 oz (4.649 kg) F Vag-Spont      1                 GYNE HISTORY:         No data recorded      Latest Ref Rng & Units 3/22/2023    12:22 PM 3/10/2022     3:14 PM   RECENT PAP RESULTS   INTERPRETATION/RESULT: Negative for intraepithelial lesion or malignancy Negative for intraepithelial lesion or malignancy  Negative for intraepithelial lesion or malignancy    HPV Negative Negative  Negative          MEDICAL HISTORY:     Past Medical History[1]    SURGICAL HISTORY:     Past Surgical History[2]    SOCIAL HISTORY:     Short Social Hx on File[3]     FAMILY HISTORY:     Family History[4]    MEDICATIONS:     Medications - Current[5]    ALLERGIES:     Allergies[6]      REVIEW OF SYSTEMS:     Constitutional:    denies fever / chills  Cardiovascular:  denies chest pain or palpitations  Respiratory:    denies shortness of breath  Gastrointestinal:  denies severe abdominal pain, frequent diarrhea or constipation, nausea / vomiting  Genitourinary:    denies dysuria, bothersome incontinence  Skin/Breast:   denies any breast pain, lumps, or discharge  Neurological:    denies frequent severe headaches  Psychiatric:   denies depression or anxiety, thoughts of harming self or others      PHYSICAL EXAM:   Blood pressure 107/69, weight 209 lb 12.8 oz (95.2 kg), last menstrual period 2025, unknown if currently breastfeeding.  Constitutional:  well developed,  well nourished, no distress  Abdomen:   soft, gravid, nontender  Musculoskeletal: no cva tenderness bilaterally  Skin/Hair:  no unusual rashes or bruises  Extremities:  no edema, no cyanosis, non tender bilaterally  Psychiatric:   oriented to time, place, person and situation. Appropriate mood and affect    Ultrasound:  siup,  crl c/w 6 1/7 weeks,  no fhts noted,   pt counseled and stat hold and call ultrasound emh ordered for today.  Counseled. Pt.   ASSESSMENT & PLAN:     Sandra was seen today for prenatal care.    Diagnoses and all orders for this visit:    Encounter for supervision of other normal pregnancy in first trimester (HCC)    No abnormality of fetal heart detected (HCC)  -     Cancel: US PREG 1ST TRIMESTER (CPT=76801); Future    Other orders  -     [facility] AMB OBGYN IM OB 1ST TRIM ABDOMINAL US ([id number])          FOLLOW-UP     No follow-ups on file.      Timothy Nogueira MD  5/7/2025         [1]   Past Medical History:   Antepartum anemia (HCC)    Diet controlled gestational diabetes mellitus (GDM) in third trimester (HCC)   [2]   Past Surgical History:  Procedure Laterality Date    Laparoscopic salpingostomy Right 06/01/2021   [3]   Social History  Socioeconomic History    Marital status: Single   Tobacco Use    Smoking status: Never    Smokeless tobacco: Never   Vaping Use    Vaping status: Never Used   Substance and Sexual Activity    Alcohol use: Never    Drug use: Never     Social Drivers of Health     Food Insecurity: No Food Insecurity (8/27/2024)    Food Insecurity     Food Insecurity: Never true   Transportation Needs: No Transportation Needs (8/27/2024)    Transportation Needs     Lack of Transportation: No   Housing Stability: Low Risk  (8/27/2024)    Housing Stability     Housing Instability: No   [4]   Family History  Problem Relation Age of Onset    No Known Problems Father     No Known Problems Mother    [5]   Current Outpatient Medications:     Blood Glucose Monitoring Suppl (CONTOUR NEXT  MONITOR) w/Device Does not apply Kit, 1 Device in the morning, at noon, in the evening, and at bedtime. Ok to dispense kit covered under patient's health plan. (Patient not taking: Reported on 5/7/2025), Disp: 1 kit, Rfl: 0    prenatal vitamin with DHA 27-0.8-228 MG Oral Cap, Take 1 capsule by mouth daily. (Patient not taking: Reported on 5/7/2025), Disp: , Rfl:     Blood Glucose Monitoring Suppl (ONETOUCH VERIO FLEX SYSTEM) w/Device Does not apply Kit, 1 kit 2 (two) times daily. May substitute per insurance formulary (Patient not taking: Reported on 5/7/2025), Disp: 1 kit, Rfl: 0  [6]   Allergies  Allergen Reactions    Diclofenac ANGIOEDEMA    Penicillins SHORTNESS OF BREATH

## 2025-05-14 ENCOUNTER — OFFICE VISIT (OUTPATIENT)
Dept: OBGYN CLINIC | Facility: CLINIC | Age: 35
End: 2025-05-14

## 2025-05-14 ENCOUNTER — LAB ENCOUNTER (OUTPATIENT)
Dept: LAB | Facility: HOSPITAL | Age: 35
End: 2025-05-14
Attending: OBSTETRICS & GYNECOLOGY
Payer: MEDICAID

## 2025-05-14 VITALS
DIASTOLIC BLOOD PRESSURE: 74 MMHG | BODY MASS INDEX: 37.32 KG/M2 | SYSTOLIC BLOOD PRESSURE: 117 MMHG | HEIGHT: 62.9 IN | WEIGHT: 210.63 LBS

## 2025-05-14 DIAGNOSIS — Z34.81 ENCOUNTER FOR SUPERVISION OF OTHER NORMAL PREGNANCY IN FIRST TRIMESTER (HCC): ICD-10-CM

## 2025-05-14 DIAGNOSIS — Z34.81 ENCOUNTER FOR SUPERVISION OF OTHER NORMAL PREGNANCY IN FIRST TRIMESTER (HCC): Primary | ICD-10-CM

## 2025-05-14 LAB — B-HCG SERPL-ACNC: ABNORMAL MIU/ML (ref ?–4.2)

## 2025-05-14 PROCEDURE — 36415 COLL VENOUS BLD VENIPUNCTURE: CPT

## 2025-05-14 PROCEDURE — 84702 CHORIONIC GONADOTROPIN TEST: CPT

## 2025-05-14 NOTE — PROGRESS NOTES
Sandra Gomez is a 34 year old female  Patient's last menstrual period was 2025.   Chief Complaint   Patient presents with    Prenatal Care     OB 1 u/s       OBSTETRICS HISTORY:     OB History    Para Term  AB Living   5 2 2 0 1 2   SAB IAB Ectopic Multiple Live Births     1 0 1      # Outcome Date GA Lbr Juventino/2nd Weight Sex Type Anes PTL Lv   5 Current            4 Term 22 38w5d 01:57 / 00:07 8 lb 12 oz (3.97 kg) M NORMAL SPONT None N ZOË      Complications: Precipitous labor   3 Ectopic 20 5w0d          2 Term 13 40w0d  10 lb 4 oz (4.649 kg) F Vag-Spont      1                 GYNE HISTORY:     Hx Prior Abnormal Pap: No  Pap Date: 22  Pap Result Notes: negative   Menarche: 14 (2025 12:14 PM)  Period Cycle (Days): no cycles since last pregnancy (2025 12:14 PM)  Use of Birth Control (if yes, specify type): None (2025 12:14 PM)  Hx Prior Abnormal Pap: No (2025 12:14 PM)  Pap Date: 22 (2025 12:14 PM)  Pap Result Notes: negative (2025 12:14 PM)        Latest Ref Rng & Units 3/22/2023    12:22 PM 3/10/2022     3:14 PM   RECENT PAP RESULTS   INTERPRETATION/RESULT: Negative for intraepithelial lesion or malignancy Negative for intraepithelial lesion or malignancy  Negative for intraepithelial lesion or malignancy    HPV Negative Negative  Negative          MEDICAL HISTORY:     Past Medical History[1]    SURGICAL HISTORY:     Past Surgical History[2]    SOCIAL HISTORY:     Short Social Hx on File[3]     FAMILY HISTORY:     Family History[4]    MEDICATIONS:     Medications - Current[5]    ALLERGIES:     Allergies[6]      REVIEW OF SYSTEMS:     Constitutional:    denies fever / chills  Cardiovascular:  denies chest pain or palpitations  Respiratory:    denies shortness of breath  Gastrointestinal:  denies severe abdominal pain, frequent diarrhea or constipation, nausea / vomiting  Genitourinary:    denies dysuria, bothersome  incontinence  Skin/Breast:   denies any breast pain, lumps, or discharge  Neurological:    denies frequent severe headaches  Psychiatric:   denies depression or anxiety, thoughts of harming self or others      PHYSICAL EXAM:   Blood pressure 117/74, height 5' 2.9\" (1.598 m), weight 210 lb 9.6 oz (95.5 kg), last menstrual period 02/22/2025, unknown if currently breastfeeding.  Constitutional:  well developed, well nourished, no distress  Abdomen:   soft, gravid, nontender  Musculoskeletal: no cva tenderness bilaterally  Skin/Hair:  no unusual rashes or bruises  Extremities:  no edema, no cyanosis, non tender bilaterally  Psychiatric:   oriented to time, place, person and situation. Appropriate mood and affect    Ultrasound : siup,  crl c/w 6 1/7 weeks, no fhts noted, pt counseled, check bhcg now and in 2 days and repeat ultrasound 1 week.   Poss nonviable preg counseled to pt.   ASSESSMENT & PLAN:     There are no diagnoses linked to this encounter.      FOLLOW-UP     No follow-ups on file.      Timothy Nogueira MD  5/14/2025         [1]   Past Medical History:   Antepartum anemia (HCC)    Diet controlled gestational diabetes mellitus (GDM) in third trimester (HCC)   [2]   Past Surgical History:  Procedure Laterality Date    Laparoscopic salpingostomy Right 06/01/2021   [3]   Social History  Socioeconomic History    Marital status: Single   Tobacco Use    Smoking status: Never    Smokeless tobacco: Never   Vaping Use    Vaping status: Never Used   Substance and Sexual Activity    Alcohol use: Never    Drug use: Never     Social Drivers of Health     Food Insecurity: No Food Insecurity (8/27/2024)    Food Insecurity     Food Insecurity: Never true   Transportation Needs: No Transportation Needs (8/27/2024)    Transportation Needs     Lack of Transportation: No   Housing Stability: Low Risk  (8/27/2024)    Housing Stability     Housing Instability: No   [4]   Family History  Problem Relation Age of Onset    No  Known Problems Father     No Known Problems Mother    [5]   Current Outpatient Medications:     prenatal vitamin with DHA 27-0.8-228 MG Oral Cap, Take 1 capsule by mouth daily., Disp: , Rfl:     Blood Glucose Monitoring Suppl (CONTOUR NEXT MONITOR) w/Device Does not apply Kit, 1 Device in the morning, at noon, in the evening, and at bedtime. Ok to dispense kit covered under patient's health plan. (Patient not taking: Reported on 5/7/2025), Disp: 1 kit, Rfl: 0    Blood Glucose Monitoring Suppl (ONETOUCH VERIO FLEX SYSTEM) w/Device Does not apply Kit, 1 kit 2 (two) times daily. May substitute per insurance formulary (Patient not taking: Reported on 5/7/2025), Disp: 1 kit, Rfl: 0  [6]   Allergies  Allergen Reactions    Diclofenac ANGIOEDEMA    Penicillins SHORTNESS OF BREATH

## 2025-05-17 ENCOUNTER — HOSPITAL ENCOUNTER (EMERGENCY)
Facility: HOSPITAL | Age: 35
Discharge: HOME | End: 2025-05-17
Payer: MEDICAID

## 2025-05-17 ENCOUNTER — APPOINTMENT (OUTPATIENT)
Dept: ULTRASOUND IMAGING | Facility: HOSPITAL | Age: 35
End: 2025-05-17
Attending: NURSE PRACTITIONER
Payer: MEDICAID

## 2025-05-17 VITALS
TEMPERATURE: 98 F | HEART RATE: 69 BPM | DIASTOLIC BLOOD PRESSURE: 67 MMHG | RESPIRATION RATE: 18 BRPM | BODY MASS INDEX: 34.99 KG/M2 | WEIGHT: 210 LBS | OXYGEN SATURATION: 99 % | SYSTOLIC BLOOD PRESSURE: 115 MMHG | HEIGHT: 64.96 IN

## 2025-05-17 DIAGNOSIS — O03.4 INCOMPLETE MISCARRIAGE (HCC): Primary | ICD-10-CM

## 2025-05-17 LAB
ANION GAP SERPL CALC-SCNC: 9 MMOL/L (ref 0–18)
B-HCG SERPL-ACNC: ABNORMAL MIU/ML (ref ?–4.2)
BASOPHILS # BLD AUTO: 0.03 X10(3) UL (ref 0–0.2)
BASOPHILS NFR BLD AUTO: 0.5 %
BILIRUB UR QL: NEGATIVE
BUN BLD-MCNC: 9 MG/DL (ref 9–23)
BUN/CREAT SERPL: 12.3 (ref 10–20)
CALCIUM BLD-MCNC: 9.3 MG/DL (ref 8.7–10.4)
CHLORIDE SERPL-SCNC: 100 MMOL/L (ref 98–112)
CLARITY UR: CLEAR
CO2 SERPL-SCNC: 28 MMOL/L (ref 21–32)
COLOR UR: COLORLESS
CREAT BLD-MCNC: 0.73 MG/DL (ref 0.55–1.02)
DEPRECATED RDW RBC AUTO: 38.5 FL (ref 35.1–46.3)
EGFRCR SERPLBLD CKD-EPI 2021: 111 ML/MIN/1.73M2 (ref 60–?)
EOSINOPHIL # BLD AUTO: 0.09 X10(3) UL (ref 0–0.7)
EOSINOPHIL NFR BLD AUTO: 1.5 %
ERYTHROCYTE [DISTWIDTH] IN BLOOD BY AUTOMATED COUNT: 12.5 % (ref 11–15)
GLUCOSE BLD-MCNC: 93 MG/DL (ref 70–99)
GLUCOSE UR-MCNC: NORMAL MG/DL
HCT VFR BLD AUTO: 39.3 % (ref 35–48)
HGB BLD-MCNC: 13.4 G/DL (ref 12–16)
IMM GRANULOCYTES # BLD AUTO: 0.06 X10(3) UL (ref 0–1)
IMM GRANULOCYTES NFR BLD: 1 %
KETONES UR-MCNC: NEGATIVE MG/DL
LEUKOCYTE ESTERASE UR QL STRIP.AUTO: 75
LYMPHOCYTES # BLD AUTO: 1.31 X10(3) UL (ref 1–4)
LYMPHOCYTES NFR BLD AUTO: 22.1 %
MCH RBC QN AUTO: 29.2 PG (ref 26–34)
MCHC RBC AUTO-ENTMCNC: 34.1 G/DL (ref 31–37)
MCV RBC AUTO: 85.6 FL (ref 80–100)
MONOCYTES # BLD AUTO: 0.55 X10(3) UL (ref 0.1–1)
MONOCYTES NFR BLD AUTO: 9.3 %
NEUTROPHILS # BLD AUTO: 3.89 X10 (3) UL (ref 1.5–7.7)
NEUTROPHILS # BLD AUTO: 3.89 X10(3) UL (ref 1.5–7.7)
NEUTROPHILS NFR BLD AUTO: 65.6 %
NITRITE UR QL STRIP.AUTO: NEGATIVE
OSMOLALITY SERPL CALC.SUM OF ELEC: 282 MOSM/KG (ref 275–295)
PH UR: 6 [PH] (ref 5–8)
PLATELET # BLD AUTO: 191 10(3)UL (ref 150–450)
POTASSIUM SERPL-SCNC: 3.7 MMOL/L (ref 3.5–5.1)
PROT UR-MCNC: NEGATIVE MG/DL
RBC # BLD AUTO: 4.59 X10(6)UL (ref 3.8–5.3)
SODIUM SERPL-SCNC: 137 MMOL/L (ref 136–145)
SP GR UR STRIP: 1.01 (ref 1–1.03)
UROBILINOGEN UR STRIP-ACNC: NORMAL
WBC # BLD AUTO: 5.9 X10(3) UL (ref 4–11)

## 2025-05-17 PROCEDURE — 81001 URINALYSIS AUTO W/SCOPE: CPT | Performed by: EMERGENCY MEDICINE

## 2025-05-17 PROCEDURE — 84702 CHORIONIC GONADOTROPIN TEST: CPT | Performed by: EMERGENCY MEDICINE

## 2025-05-17 PROCEDURE — 76817 TRANSVAGINAL US OBSTETRIC: CPT | Performed by: NURSE PRACTITIONER

## 2025-05-17 PROCEDURE — 76801 OB US < 14 WKS SINGLE FETUS: CPT | Performed by: NURSE PRACTITIONER

## 2025-05-17 PROCEDURE — 99284 EMERGENCY DEPT VISIT MOD MDM: CPT

## 2025-05-17 PROCEDURE — 85025 COMPLETE CBC W/AUTO DIFF WBC: CPT | Performed by: EMERGENCY MEDICINE

## 2025-05-17 PROCEDURE — 36415 COLL VENOUS BLD VENIPUNCTURE: CPT

## 2025-05-17 PROCEDURE — 80048 BASIC METABOLIC PNL TOTAL CA: CPT | Performed by: EMERGENCY MEDICINE

## 2025-05-17 PROCEDURE — 87086 URINE CULTURE/COLONY COUNT: CPT | Performed by: EMERGENCY MEDICINE

## 2025-05-17 NOTE — ED PROVIDER NOTES
Patient Seen in: [facility] Emergency Department      History     Chief Complaint   Patient presents with    Pregnancy Issues     Stated Complaint: Pregnancy Issue; Bleeding    Subjective:   33yo/f 12 weeks pregnant by US reports vaginal bleeding x 90 mins. Spotting after urinating. Saw her OB last week and was told small pregnancy without audible FHT and advised to return Monday (2 days from now) to assess viability. No flank pain. +back pain. No urinary symptoms. No chest pain. No trauma.       History of Present Illness                  Objective:     No pertinent past medical history.            No pertinent past surgical history.              No pertinent social history.                              Physical Exam     ED Triage Vitals [05/17/25 1732]   /64   Pulse 79   Resp 18   Temp 98.1 °F (36.7 °C)   Temp src Temporal   SpO2 100 %   O2 Device None (Room air)       Current Vitals:   Vital Signs  BP: 105/69  Pulse: 69  Resp: 18  Temp: 98.1 °F (36.7 °C)  Temp src: Temporal  MAP (mmHg): 81    Oxygen Therapy  SpO2: 100 %  O2 Device: None (Room air)          Physical Exam  Vitals and nursing note reviewed.   Constitutional:       General: She is not in acute distress.     Appearance: She is well-developed.   HENT:      Head: Normocephalic and atraumatic.      Nose: Nose normal.      Mouth/Throat:      Mouth: Mucous membranes are moist.   Eyes:      Conjunctiva/sclera: Conjunctivae normal.      Pupils: Pupils are equal, round, and reactive to light.   Cardiovascular:      Rate and Rhythm: Normal rate and regular rhythm.      Heart sounds: Normal heart sounds.   Pulmonary:      Effort: Pulmonary effort is normal.      Breath sounds: Normal breath sounds.   Abdominal:      General: Bowel sounds are normal.      Palpations: Abdomen is soft.   Musculoskeletal:         General: No tenderness or deformity. Normal range of motion.      Cervical back: Normal range of motion and neck supple.   Skin:      General: Skin is warm and dry.      Capillary Refill: Capillary refill takes less than 2 seconds.      Findings: No rash.      Comments: Normal color   Neurological:      General: No focal deficit present.      Mental Status: She is alert and oriented to person, place, and time.      GCS: GCS eye subscore is 4. GCS verbal subscore is 5. GCS motor subscore is 6.      Cranial Nerves: No cranial nerve deficit.      Gait: Gait normal.                   ED Course     Labs Reviewed   URINALYSIS WITH CULTURE REFLEX - Abnormal; Notable for the following components:       Result Value    Urine Color Colorless (*)     Blood Urine 3+ (*)     Leukocyte Esterase Urine 75 (*)     WBC Urine 6-10 (*)     RBC Urine 3-5 (*)     Squamous Epi. Cells Few (*)     All other components within normal limits   HCG, BETA SUBUNIT (QUANT PREGNANCY TEST) - Abnormal; Notable for the following components:    Hcg Quantitative 19,623.8 (*)     All other components within normal limits   BASIC METABOLIC PANEL (8) - Normal   CBC WITH DIFFERENTIAL WITH PLATELET   URINE CULTURE, ROUTINE          Results            FINDINGS:  GESTATIONAL SAC:   Slightly irregular gestational sac with small perigestational hemorrhage.  FETAL POLE/EMBRYO:   Fetal pole previously measured 3.3 mm and currently measures 2.6 mm.  YOLK SAC:   Previously seen yolk sac has involuted.  CARDIAC ACTIVITY:   No fetal cardiac activity     UTERUS:   Normal.  No masses.  The uterus measures 9.7 x 5.6 x 6.4 cm.     CRL:   2.6 mm  ULTRASOUND AGE :   6 weeks 2 days     OVARIES AND ADNEXA:   No masses    RIGHT OVARY:   Measures 3.6 x 2.6 x 2 cm.  LEFT OVARY:   Obscured by bowel gas.  CUL-DE-SAC:   Normal.  No free fluid.       OTHER:   Negative.                Impression  CONCLUSION:  1. Early pregnancy failure with crown-rump length corresponding to 6 weeks 2 days.              Dictated by (CST): Ryan Lemus MD on 5/17/2025 at 8:03 PM      Finalized by (CST): Ryan Lemus MD on  5/17/2025 at 8:10 PM              Exam Ended: 05/17/25 19:51                       MDM              Medical Decision Making  35yo/f w hx and exam as stated; 12 weeks preg, bleeding    Preg only 6 weeks, no fht, c/w demise  Discussed w patient  Notified Dr. Olmstead   Normotensive  Non anemic  No heavy bleeding in ed    Discussed expectant management vs symptoms warranting return      Amount and/or Complexity of Data Reviewed  Labs:  Decision-making details documented in ED Course.  Radiology:  Decision-making details documented in ED Course.    Risk  OTC drugs.  Prescription drug management.        Disposition and Plan     Clinical Impression:  1. Incomplete miscarriage (HCC)         Disposition:  Discharge  5/17/2025  8:21 pm    Follow-up:  Timothy Nogueira MD  [address]   [address] [place] [address]  [phone]    Go in 2 day(s)            Medications Prescribed:  Current Discharge Medication List                Supplementary Documentation:

## 2025-05-17 NOTE — ED INITIAL ASSESSMENT (HPI)
PT 12 weeks gestation to ED with c/o  vaginal bleeding x1 day.  Pt states right side mid back pain. . OB [facility]

## 2025-05-18 NOTE — ED QUICK NOTES
Pt verbalizes understanding of discharge paperwork including medications, follow-up care, and education. Pt a/o x4, VSS, NAD, ambulates with steady gait. Pt states she will follow-up with Dr. Nogueira on Monday.

## 2025-05-18 NOTE — DISCHARGE INSTRUCTIONS
Go to Dr. Nogueira Monday as planned  Return for heavy vaginal bleeding, worsening pain, dizziness or any new or worsening symptoms

## 2025-05-18 NOTE — ED QUICK NOTES
Pt states yesterday she had some pink discharge when wiping. Today, bright red blood when wiping. +cramping. Denies clots. Denies urinary symptoms.

## 2025-05-19 ENCOUNTER — OFFICE VISIT (OUTPATIENT)
Dept: OBGYN CLINIC | Facility: CLINIC | Age: 35
End: 2025-05-19

## 2025-05-19 ENCOUNTER — TELEPHONE (OUTPATIENT)
Dept: OBGYN CLINIC | Facility: CLINIC | Age: 35
End: 2025-05-19

## 2025-05-19 DIAGNOSIS — O03.9 MISCARRIAGE (HCC): Primary | ICD-10-CM

## 2025-05-19 DIAGNOSIS — O02.1 MISSED ABORTION (HCC): ICD-10-CM

## 2025-05-19 DIAGNOSIS — O02.1 MISSED ABORTION (HCC): Primary | ICD-10-CM

## 2025-05-19 NOTE — PROGRESS NOTES
Sandra Gomez is a 34 year old female  Patient's last menstrual period was 2025.   Chief Complaint   Patient presents with    Ultrasound       OBSTETRICS HISTORY:     OB History    Para Term  AB Living   5 2 2 0 1 2   SAB IAB Ectopic Multiple Live Births     1 0 1      # Outcome Date GA Lbr Juventino/2nd Weight Sex Type Anes PTL Lv   5 Current            4 Term 22 38w5d 01:57 / 00:07 8 lb 12 oz (3.97 kg) M NORMAL SPONT None N ZOË      Complications: Precipitous labor   3 Ectopic 20 5w0d          2 Term 13 40w0d  10 lb 4 oz (4.649 kg) F Vag-Spont      1                 GYNE HISTORY:         Menarche: 14 (2025 12:14 PM)  Period Cycle (Days): no cycles since last pregnancy (2025 12:14 PM)  Use of Birth Control (if yes, specify type): None (2025 12:14 PM)  Hx Prior Abnormal Pap: No (2025 12:14 PM)  Pap Date: 22 (2025 12:14 PM)  Pap Result Notes: negative (2025 12:14 PM)        Latest Ref Rng & Units 3/22/2023    12:22 PM 3/10/2022     3:14 PM   RECENT PAP RESULTS   INTERPRETATION/RESULT: Negative for intraepithelial lesion or malignancy Negative for intraepithelial lesion or malignancy  Negative for intraepithelial lesion or malignancy    HPV Negative Negative  Negative          MEDICAL HISTORY:     Past Medical History[1]    SURGICAL HISTORY:     Past Surgical History[2]    SOCIAL HISTORY:     Short Social Hx on File[3]     FAMILY HISTORY:     Family History[4]    MEDICATIONS:     Medications - Current[5]    ALLERGIES:     Allergies[6]      REVIEW OF SYSTEMS:     Constitutional:    denies fever / chills  Cardiovascular:  denies chest pain or palpitations  Respiratory:    denies shortness of breath  Gastrointestinal:  denies severe abdominal pain, frequent diarrhea or constipation, nausea / vomiting  Genitourinary:    denies dysuria, bothersome incontinence  Skin/Breast:   denies any breast pain, lumps, or discharge  Neurological:     denies frequent severe headaches  Psychiatric:   denies depression or anxiety, thoughts of harming self or others      PHYSICAL EXAM:   Last menstrual period 02/22/2025, unknown if currently breastfeeding.  Constitutional:  well developed, well nourished, no distress  Abdomen:   soft, gravid, nontender  Musculoskeletal: no cva tenderness bilaterally  Skin/Hair:  no unusual rashes or bruises  Extremities:  no edema, no cyanosis, non tender bilaterally  Psychiatric:   oriented to time, place, person and situation. Appropriate mood and affect    Ultrasound:  crl noted to have abnormal configuration, no fhts noted, c/w [facility]rasound [facility] 2 days prior.  Dropping bhcg also noted c/w missed ab.   Pt counseled and pt requested to schedule suction d & c.    ASSESSMENT & PLAN:     There are no diagnoses linked to this encounter.      FOLLOW-UP     No follow-ups on file.      Timothy Nogueira MD  5/19/2025         [1]   Past Medical History:   Antepartum anemia (HCC)    Diet controlled gestational diabetes mellitus (GDM) in third trimester (HCC)   [2]   Past Surgical History:  Procedure Laterality Date    Laparoscopic salpingostomy Right 06/01/2021   [3]   Social History  Socioeconomic History    Marital status: Single   Tobacco Use    Smoking status: Never    Smokeless tobacco: Never   Vaping Use    Vaping status: Never Used   Substance and Sexual Activity    Alcohol use: Never    Drug use: Never     Social Drivers of Health     Food Insecurity: No Food Insecurity (8/27/2024)    Food Insecurity     Food Insecurity: Never true   Transportation Needs: No Transportation Needs (8/27/2024)    Transportation Needs     Lack of Transportation: No   Housing Stability: Low Risk  (8/27/2024)    Housing Stability     Housing Instability: No   [4]   Family History  Problem Relation Age of Onset    No Known Problems Father     No Known Problems Mother    [5]   Current Outpatient Medications:     Blood Glucose Monitoring Suppl (CONTOUR  NEXT MONITOR) w/Device Does not apply Kit, 1 Device in the morning, at noon, in the evening, and at bedtime. Ok to dispense kit covered under patient's health plan. (Patient not taking: Reported on 5/7/2025), Disp: 1 kit, Rfl: 0    prenatal vitamin with DHA 27-0.8-228 MG Oral Cap, Take 1 capsule by mouth daily., Disp: , Rfl:     Blood Glucose Monitoring Suppl (ONETOUCH VERIO FLEX SYSTEM) w/Device Does not apply Kit, 1 kit 2 (two) times daily. May substitute per insurance formulary (Patient not taking: Reported on 5/7/2025), Disp: 1 kit, Rfl: 0  [6]   Allergies  Allergen Reactions    Diclofenac ANGIOEDEMA    Penicillins SHORTNESS OF BREATH

## 2025-05-21 NOTE — DISCHARGE INSTRUCTIONS
Instrucciones de cynthia para fer dilatación y legrado  Pineda médico le ha hecho un procedimiento llamado dilatación y legrado. Los motivos por los que se realiza iraida procedimiento varían. Puede realizarse para controlar un sangrado uterino muy abundante o para encontrar la causa de un sangrado irregular. También se puede realizar para extraer tejido si ha tenido un aborto provocado o espontáneo.   Cuidados en el hogar  Tómelo con calma. Descanse terry 2 días según lo necesite.  Puede reanudar fatou actividades normales al cabo de entre 24 y 48 horas. También puede regresar al trabajo en alverto momento.  Siga fer dieta normal.  Si lo necesita, tómese un analgésico de venta kate para aliviar el dolor.  Es normal que haya sangrado terry fer semana después del procedimiento. La cantidad de sai debe ser similar a la que hay terry fer menstruación normal.  No conduzca terry las siguientes 24 horas después del procedimiento, a menos que pineda proveedor le diga que puede hacerlo.  No tenga relaciones sexuales, no use tampones ni se jeffy duchas vaginales hasta que el proveedor de atención médica le diga que es seguro.     Visitas de control  Programe fer visita de control según le indique pineda proveedor de atención médica.     Cuándo llamar a pineda proveedor de atención médica  Llame de inmediato a pineda proveedor de atención médica en cualquiera de los siguientes casos:   Sangrado que empapa más de 1 toalla sanitaria en 1 hora  Dolor abdominal peter  Cólicos muy magan  Fiebre de  100.4 °F ( 38.0 °C) o más cynthia, o según le indique el proveedor  Flujo vaginal con olor desagradable     CIRUGIA AMBULATORIA: INSTRUCCIONES DESPUES DE NOEMI RECIBIDO ANESTESIA  Debido a la Anestesia y a las medicamentos que se le aplicaron terry la cirugia, fatou reflejos y capacidaddiscernimiento pueden verse afectados. Tambien podria tener un poco de mareo.Aparte de siguir las precauciones de sentico comun, le recomendamos lo siguiente:     El  paciente debe estar acompañado por alguien hasta la mañana siguiente.     No maneje ningun vehiculo automotor ni monte bicicleta.     No tome ninguna decision importante mathew por ejemplo firmar de documentos importantes.     No opere herramientas electricas ni electrodomesticos, tales mathew cuchillos electricos, batidoras electricas o serruchos electricos. No danna el cesped con cortadoras electricas. No practique deportes.     No jeffy ejercicio.     Para evitar las nauseas, coma menos de lo normal mas o menos la mitad de lo habitual y/o yoanna solo liquidos hasta la manana siguiente. Consulte con pineda doctor si esta llevando fer dieta especial.     No tome bebidas alcoholicas, tranquilizantes, pastilles para dormir etc., y verifique con pineda doctor acerca de cualquier medicamento que iraida tomando actualmente.     El efecto de la medicación usada en pineda anestesia habra pasado stepan por completo a la medianoche. Por lo tanto, puede reanudar fatou habitos cotidianos en la mañana.     Los adultos deben descansar lo li posible por las siguientes 24 horas. Los niños deben permanecer en cama lo li posible por las siguientes 24 horas.     Si se presenta cualquier problema, puede llamar a pineda propio doctor personal o aceda al centro de [facility].    Si sigue estas instrucciones, se sentira major y estara mas seguro despues de pineda cirugia ambulatoria. Sitiene cualquier pregunta, llame al hospital y pida que lo comuniquen con la enfermera de cirugia [phone], extension [phone].    Instrucciones para el cynthia: después de pineda cirugía   Acaba de someterse a fer cirugía. Jil la cirugía, le administraron un tipo de medicamento llamado anestesia para que esté relajado y no sienta dolor. Después de la cirugía, leonarda vez sienta algo de dolor o náuseas. Lovilia es común. Estos son algunos consejos para sentirse mejor y recuperarse pawel después de la cirugía.   El regreso a casa  Pineda proveedor de  atención médica le enseñará cómo cuidarse cuando regrese a pineda casa. También responderá fatou preguntas. Pida a un familiar o amigo adulto que lo conduzca a pineda casa. Terry las primeras 24 horas después de la cirugía, siga estas recomendaciones:   No conduzca ni use maquinaria pesada.  No tome decisiones importantes ni firme ningún documento legal.  Adminístrese los medicamentos según las indicaciones.  Evite el consumo de alcohol.  Si es necesario, coordine para que alguien se quede con usted. Esta persona puede vigilar cualquier problema que se presente y lo ayudará a permanecer seguro.  Asegúrese de asistir a todas fatou visitas de control con pineda proveedor de atención médica. Y descanse después de la cirugía terry el tiempo que le indique pineda proveedor.   Cómo sobrellevar el dolor  Si siente dolor después de la cirugía, los analgésicos lo ayudarán a sentirse mejor. [place] los analgésicos según las indicaciones, antes de que el dolor se intensifique. Además, pregunte a pineda proveedor de atención médica o al farmacéutico acerca de otras formas de controlar el dolor. Estas podrían incluir aplicar calor o hielo, o hacer ejercicios de relajación. Y siga todas las instrucciones que le dé pineda cirujano o enfermero.      Cumpla el cronograma de fatou medicamentos.     Consejos para jayden analgésicos  Para aliviar el dolor lo sara posible, recuerde estos puntos:   Los analgésicos pueden causar malestar estomacal. Tomarlos con un poco de comida puede aliviar tiesha efecto.  La mayoría de los calmantes que se leena por la boca necesitan por lo menos de 20 a 30 minutos para surtir efecto.  No espere hasta que pineda dolor se vuelva intenso para jayden el analgésico que le indicaron. Intente que el momento en que puede jayden pineda medicamento coincida con otra actividad. Tiesha podría ser el momento antes de vestirse, ariel un paseo o sentarse a la rodarte para cenar.  El estreñimiento es un efecto secundario frecuente de algunos analgésicos. Consulte  a pienda proveedor de atención médica antes de usar cualquier medicamento, mathew laxantes o ablandadores de heces, para ayudar a aliviar el estreñimiento. También consulte si es preciso evitar algún tipo de alimento. [place] mucha cantidad de líquido y comer alimentos mathew frutas y verduras con alto contenido de fibra también puede ser beneficioso. Recuerde que no debe kalyan laxantes a menos que pineda cirujano se los indique.  Mezclar bebidas alcohólicas y analgésicos puede causar mareos y enlentecer pineda respiración. Y hasta puede ser mortal. No yoanna alcohol mientras esté tomando calmantes.  Los analgésicos pueden hacer que tenga reacciones más lentas. No conduzca ni opere maquinaria mientras esté tomando analgésicos.  Pineda proveedor de atención médica puede indicarle que tome acetaminofén (paracetamol) para ayudar a aliviar el dolor. Pregúntele qué cantidad debe kalyan por día. El acetaminofén y otros analgésicos pueden interactuar con fatou medicamentos recetados u otros medicamentos de venta kate (OTC, por fatou siglas en inglés). Algunos medicamentos recetados contienen acetaminofén y otros ingredientes. Combinar medicamentos recetados y acetaminofén de venta kate para aliviar el dolor puede provocarle fer sobredosis accidental. Daja atentamente la etiqueta del envase de fatou medicamentos OTC. [place] lo ayudará a saber con exactitud la lista de ingredientes, la cantidad que debe kalyan y cualquier advertencia. [place] también puede ayudarlo a evitar kalyan demasiado acetaminofén. Si tiene preguntas o no entiende la información, pídale a pineda farmacéutico o proveedor de atención médica que se la explique antes de kalyan el medicamento OTC.   Manejo de las náuseas  Algunas personas pueden sentir malestar estomacal (náuseas) después de la cirugía. [place] suele suceder debido a la anestesia, el dolor, los analgésicos, la disminución del movimiento de la comida en el estómago o el estrés de la cirugía. Estos consejos lo ayudarán a manejar las  náuseas y a comer alimentos más saludables mientras se recupera. Si seguía un plan alimentario especial antes de la cirugía, pregúntele a pineda proveedor de atención médica si debe continuarlo mientras se recupera. Consulte con pineda proveedor cómo debería continuar pineda alimentación. Esta puede variar según el tipo de cirugía a la que se sometió. Los siguientes consejos generales pueden serle útiles:   No se fuerce a comer. Guíese por pineda cuerpo para saber cuándo comer y qué cantidad.  Comience con líquidos transparentes y sopa. Estos son más fáciles de digerir.  Tan pronto mathew se sienta listo, intente comer alimentos semisólidos. Estos incluyen puré de filomena, puré de manzana y gelatina.  Lentamente, pase a alimentos sólidos. Al principio no coma alimentos grasosos, pesados ni condimentados.  No se fuerce a hacer luisa comidas grandes al día. En cambio, coma cantidades pequeñas, lady con mayor frecuencia.  [place] los analgésicos con fer pequeña cantidad de alimentos sólidos, mathew galletas saladas o fer tostada. [place] ayuda a prevenir las náuseas.  Cuándo llamar a pineda proveedor de atención médica   Llame de inmediato a pineda proveedor de atención médica si nota alguno de los siguientes síntomas:   Sigue teniendo mucho dolor, o el dolor empeora, después de jayden el medicamento. Puede que el medicamento no sea lo suficientemente peter. O pawel, puede erwin complicaciones de la cirugía.  Se siente demasiado somnoliento, mareado o adormecido. Quizás el medicamento sea demasiado peter.  Tiene efectos secundarios, mathew náuseas o vómitos. Pineda proveedor de atención médica puede recomendarle jayden otros medicamentos.  Tiene cambios en la piel, mathew sarpullido, picazón o urticaria. [place] puede significar que tiene fer reacción alérgica. Pineda proveedor puede recomendarle jayden otros medicamentos.  La incisión tiene un aspecto diferente (por ejemplo, se abre fer parte).  Tiene sangrado o supuración de líquido de la herida y no le dijeron que eso  era esperable.  Fiebre de 100.4 °F (38 °C) o más, o según le indique pineda proveedor.  Cuándo llamar al 911  Llame al  911  de inmediato si tiene:   Dificultad para respirar  Kelle hinchada    Si tiene apnea del sueño obstructiva   Jil la cirugía, le administraron anestesia para que esté cómodo y no sienta dolor. Después de la cirugía, es probable que tenga más ataques de apnea causados por la anestesia y otros medicamentos que le administraron. Los ataques pueden durar más de lo habitual.    En pineda casa, jeffy lo siguiente:  Cuando duerma, siga usando pineda dispositivo de presión positiva continua en las vías respiratorias (CPAP, por fatou siglas en inglés). A menos que pineda proveedor de atención médica le indique lo contrario, úselo siempre que duerma, ya sea de día o de noche. El dispositivo de CPAP suele usarse para tratar la apnea obstructiva del sueño.  Consulte a pineda proveedor antes de jayden cualquier analgésico, relajante muscular o sedante. Pineda proveedor le dará información sobre los peligros de jayden estos medicamentos.  Comuníquese con pineda proveedor si tiene el sueño demasiado alterado, incluso cuando esté tomando los medicamentos según las instrucciones.  This information is for informational purposes only. This is not intended to be a substitute for professional medical advice, diagnosis, or treatment. Always seek the advice and follow the directions from your physician or other qualified health care provider.  © 3581-1400 [employer]. Todos los derechos reservados. Esta información no pretende sustituir la atención médica profesional. Sólo pineda médico puede diagnosticar y tratar un problema de shawanda.

## 2025-05-22 ENCOUNTER — HOSPITAL ENCOUNTER (OUTPATIENT)
Facility: HOSPITAL | Age: 35
Setting detail: HOSPITAL OUTPATIENT SURGERY
Discharge: HOME | End: 2025-05-22
Attending: STUDENT IN AN ORGANIZED HEALTH CARE EDUCATION/TRAINING PROGRAM | Admitting: STUDENT IN AN ORGANIZED HEALTH CARE EDUCATION/TRAINING PROGRAM
Payer: MEDICAID

## 2025-05-22 ENCOUNTER — APPOINTMENT (OUTPATIENT)
Dept: ULTRASOUND IMAGING | Facility: HOSPITAL | Age: 35
End: 2025-05-22
Attending: STUDENT IN AN ORGANIZED HEALTH CARE EDUCATION/TRAINING PROGRAM
Payer: MEDICAID

## 2025-05-22 ENCOUNTER — ANESTHESIA (OUTPATIENT)
Dept: SURGERY | Facility: HOSPITAL | Age: 35
End: 2025-05-22
Payer: MEDICAID

## 2025-05-22 ENCOUNTER — ANESTHESIA EVENT (OUTPATIENT)
Dept: SURGERY | Facility: HOSPITAL | Age: 35
End: 2025-05-22
Payer: MEDICAID

## 2025-05-22 VITALS
SYSTOLIC BLOOD PRESSURE: 103 MMHG | RESPIRATION RATE: 18 BRPM | DIASTOLIC BLOOD PRESSURE: 60 MMHG | TEMPERATURE: 98 F | HEART RATE: 77 BPM | WEIGHT: 210 LBS | OXYGEN SATURATION: 96 % | BODY MASS INDEX: 34.99 KG/M2 | HEIGHT: 64.96 IN

## 2025-05-22 DIAGNOSIS — O02.1 MISSED ABORTION (HCC): ICD-10-CM

## 2025-05-22 PROBLEM — N96 RECURRENT PREGNANCY LOSS: Status: ACTIVE | Noted: 2025-05-22

## 2025-05-22 PROCEDURE — 59820 CARE OF MISCARRIAGE: CPT | Performed by: STUDENT IN AN ORGANIZED HEALTH CARE EDUCATION/TRAINING PROGRAM

## 2025-05-22 PROCEDURE — 76998 US GUIDE INTRAOP: CPT | Performed by: STUDENT IN AN ORGANIZED HEALTH CARE EDUCATION/TRAINING PROGRAM

## 2025-05-22 RX ORDER — MIDAZOLAM HYDROCHLORIDE 1 MG/ML
INJECTION INTRAMUSCULAR; INTRAVENOUS AS NEEDED
Status: DISCONTINUED | OUTPATIENT
Start: 2025-05-22 | End: 2025-05-22 | Stop reason: SURG

## 2025-05-22 RX ORDER — METOCLOPRAMIDE 10 MG/1
10 TABLET ORAL ONCE
Status: COMPLETED | OUTPATIENT
Start: 2025-05-22 | End: 2025-05-22

## 2025-05-22 RX ORDER — IBUPROFEN 600 MG/1
600 TABLET, FILM COATED ORAL EVERY 6 HOURS PRN
Qty: 120 TABLET | Refills: 0 | Status: SHIPPED | OUTPATIENT
Start: 2025-05-22

## 2025-05-22 RX ORDER — DOXYCYCLINE 100 MG/10ML
INJECTION, POWDER, LYOPHILIZED, FOR SOLUTION INTRAVENOUS AS NEEDED
Status: DISCONTINUED | OUTPATIENT
Start: 2025-05-22 | End: 2025-05-22 | Stop reason: SURG

## 2025-05-22 RX ORDER — FAMOTIDINE 10 MG/ML
20 INJECTION, SOLUTION INTRAVENOUS ONCE
Status: COMPLETED | OUTPATIENT
Start: 2025-05-22 | End: 2025-05-22

## 2025-05-22 RX ORDER — ACETAMINOPHEN 500 MG
1000 TABLET ORAL EVERY 6 HOURS PRN
Qty: 120 TABLET | Refills: 0 | Status: SHIPPED | OUTPATIENT
Start: 2025-05-22

## 2025-05-22 RX ORDER — SODIUM CHLORIDE, SODIUM LACTATE, POTASSIUM CHLORIDE, CALCIUM CHLORIDE 600; 310; 30; 20 MG/100ML; MG/100ML; MG/100ML; MG/100ML
INJECTION, SOLUTION INTRAVENOUS CONTINUOUS
Status: DISCONTINUED | OUTPATIENT
Start: 2025-05-22 | End: 2025-05-22

## 2025-05-22 RX ORDER — TRANEXAMIC ACID 10 MG/ML
1000 INJECTION, SOLUTION INTRAVENOUS ONCE
Status: COMPLETED | OUTPATIENT
Start: 2025-05-22 | End: 2025-05-22

## 2025-05-22 RX ORDER — KETOROLAC TROMETHAMINE 30 MG/ML
INJECTION, SOLUTION INTRAMUSCULAR; INTRAVENOUS AS NEEDED
Status: DISCONTINUED | OUTPATIENT
Start: 2025-05-22 | End: 2025-05-22 | Stop reason: SURG

## 2025-05-22 RX ORDER — ACETAMINOPHEN 500 MG
1000 TABLET ORAL ONCE
Status: COMPLETED | OUTPATIENT
Start: 2025-05-22 | End: 2025-05-22

## 2025-05-22 RX ORDER — MISOPROSTOL 200 UG/1
600 TABLET ORAL ONCE
Status: COMPLETED | OUTPATIENT
Start: 2025-05-22 | End: 2025-05-22

## 2025-05-22 RX ORDER — NICOTINE POLACRILEX 4 MG
30 LOZENGE BUCCAL
Status: DISCONTINUED | OUTPATIENT
Start: 2025-05-22 | End: 2025-05-22

## 2025-05-22 RX ORDER — MORPHINE SULFATE 4 MG/ML
2 INJECTION, SOLUTION INTRAMUSCULAR; INTRAVENOUS EVERY 10 MIN PRN
Status: DISCONTINUED | OUTPATIENT
Start: 2025-05-22 | End: 2025-05-22

## 2025-05-22 RX ORDER — HYDROMORPHONE HYDROCHLORIDE 1 MG/ML
0.6 INJECTION, SOLUTION INTRAMUSCULAR; INTRAVENOUS; SUBCUTANEOUS EVERY 5 MIN PRN
Status: DISCONTINUED | OUTPATIENT
Start: 2025-05-22 | End: 2025-05-22

## 2025-05-22 RX ORDER — ONDANSETRON 2 MG/ML
4 INJECTION INTRAMUSCULAR; INTRAVENOUS EVERY 6 HOURS PRN
Status: DISCONTINUED | OUTPATIENT
Start: 2025-05-22 | End: 2025-05-22

## 2025-05-22 RX ORDER — METOCLOPRAMIDE HYDROCHLORIDE 5 MG/ML
10 INJECTION INTRAMUSCULAR; INTRAVENOUS ONCE
Status: COMPLETED | OUTPATIENT
Start: 2025-05-22 | End: 2025-05-22

## 2025-05-22 RX ORDER — MORPHINE SULFATE 4 MG/ML
4 INJECTION, SOLUTION INTRAMUSCULAR; INTRAVENOUS EVERY 10 MIN PRN
Status: DISCONTINUED | OUTPATIENT
Start: 2025-05-22 | End: 2025-05-22

## 2025-05-22 RX ORDER — NICOTINE POLACRILEX 4 MG
15 LOZENGE BUCCAL
Status: DISCONTINUED | OUTPATIENT
Start: 2025-05-22 | End: 2025-05-22

## 2025-05-22 RX ORDER — NALOXONE HYDROCHLORIDE 0.4 MG/ML
0.08 INJECTION, SOLUTION INTRAMUSCULAR; INTRAVENOUS; SUBCUTANEOUS AS NEEDED
Status: DISCONTINUED | OUTPATIENT
Start: 2025-05-22 | End: 2025-05-22

## 2025-05-22 RX ORDER — HYDROMORPHONE HYDROCHLORIDE 1 MG/ML
0.4 INJECTION, SOLUTION INTRAMUSCULAR; INTRAVENOUS; SUBCUTANEOUS EVERY 5 MIN PRN
Status: DISCONTINUED | OUTPATIENT
Start: 2025-05-22 | End: 2025-05-22

## 2025-05-22 RX ORDER — HYDROMORPHONE HYDROCHLORIDE 1 MG/ML
0.2 INJECTION, SOLUTION INTRAMUSCULAR; INTRAVENOUS; SUBCUTANEOUS EVERY 5 MIN PRN
Status: DISCONTINUED | OUTPATIENT
Start: 2025-05-22 | End: 2025-05-22

## 2025-05-22 RX ORDER — DEXAMETHASONE SODIUM PHOSPHATE 4 MG/ML
VIAL (ML) INJECTION AS NEEDED
Status: DISCONTINUED | OUTPATIENT
Start: 2025-05-22 | End: 2025-05-22 | Stop reason: SURG

## 2025-05-22 RX ORDER — ONDANSETRON 2 MG/ML
INJECTION INTRAMUSCULAR; INTRAVENOUS AS NEEDED
Status: DISCONTINUED | OUTPATIENT
Start: 2025-05-22 | End: 2025-05-22 | Stop reason: SURG

## 2025-05-22 RX ORDER — SENNA AND DOCUSATE SODIUM 50; 8.6 MG/1; MG/1
1 TABLET, FILM COATED ORAL DAILY
Qty: 90 TABLET | Refills: 0 | Status: SHIPPED | OUTPATIENT
Start: 2025-05-22

## 2025-05-22 RX ORDER — PROCHLORPERAZINE EDISYLATE 5 MG/ML
5 INJECTION INTRAMUSCULAR; INTRAVENOUS EVERY 8 HOURS PRN
Status: DISCONTINUED | OUTPATIENT
Start: 2025-05-22 | End: 2025-05-22

## 2025-05-22 RX ORDER — FAMOTIDINE 20 MG/1
20 TABLET, FILM COATED ORAL ONCE
Status: COMPLETED | OUTPATIENT
Start: 2025-05-22 | End: 2025-05-22

## 2025-05-22 RX ORDER — MORPHINE SULFATE 10 MG/ML
6 INJECTION, SOLUTION INTRAMUSCULAR; INTRAVENOUS EVERY 10 MIN PRN
Status: DISCONTINUED | OUTPATIENT
Start: 2025-05-22 | End: 2025-05-22

## 2025-05-22 RX ORDER — DEXTROSE MONOHYDRATE 25 G/50ML
50 INJECTION, SOLUTION INTRAVENOUS
Status: DISCONTINUED | OUTPATIENT
Start: 2025-05-22 | End: 2025-05-22

## 2025-05-22 RX ADMIN — ONDANSETRON 4 MG: 2 INJECTION INTRAMUSCULAR; INTRAVENOUS at 11:10:00

## 2025-05-22 RX ADMIN — MIDAZOLAM HYDROCHLORIDE 2 MG: 1 INJECTION INTRAMUSCULAR; INTRAVENOUS at 10:53:00

## 2025-05-22 RX ADMIN — KETOROLAC TROMETHAMINE 30 MG: 30 INJECTION, SOLUTION INTRAMUSCULAR; INTRAVENOUS at 11:27:00

## 2025-05-22 RX ADMIN — DOXYCYCLINE 200 MG: 100 INJECTION, POWDER, LYOPHILIZED, FOR SOLUTION INTRAVENOUS at 11:19:00

## 2025-05-22 RX ADMIN — DEXAMETHASONE SODIUM PHOSPHATE 8 MG: 4 MG/ML VIAL (ML) INJECTION at 11:10:00

## 2025-05-22 RX ADMIN — SODIUM CHLORIDE, SODIUM LACTATE, POTASSIUM CHLORIDE, CALCIUM CHLORIDE: 600; 310; 30; 20 INJECTION, SOLUTION INTRAVENOUS at 10:54:00

## 2025-05-22 RX ADMIN — SODIUM CHLORIDE, SODIUM LACTATE, POTASSIUM CHLORIDE, CALCIUM CHLORIDE: 600; 310; 30; 20 INJECTION, SOLUTION INTRAVENOUS at 11:41:00

## 2025-05-22 NOTE — OPERATIVE REPORT
[facility]  part of [facility]    Operative Note         Sandra Gomez Location: OR   CSN [id number] MRN [id number]   Admission Date 2025 Operation Date 2025   Attending Physician Shira Garcia MD       Patient Name: Sandra Gomez     Preoperative Diagnosis: Missed  (HCC) [O02.1]     Postoperative Diagnosis: same    Procedure(s):  Dilation and curettage suction with ultrasound guidance     Primary Surgeon: Shira Garcia MD, MD           Anesthesia: General     Specimen:   ID Type Source Tests Collected by Time Destination   1 : 1. products of conception & chromosomal studies - fresh Tissue Products of conception CHROMOSOME ANALYSIS, POC WITH REFLEX TO GENOMIC MICROARRAY, SURGICAL PATHOLOGY TISSUE Shira Garcia MD 2025 11:21 AM         Estimated Blood Loss: 5cc    Complications: none      Indications for procedure: missed      Surgical Findings: products of conception     Complexity: n/a (optional)    Operative Summary:    After obtaining consent, the patient was taken to the operative suite where she was administered general anesthesia with a laryngeal mask airway.  Patient was then prepped and draped in a sterile fashion.  In-and-out bladder catheterization was performed.  A time-out was performed.    A bivalve speculum was placed in the vagina.  The cervix was grasped with a tenaculum a paracervical block was done with 1% lidocaine with epi and serially dilated. A 8mm Kyrgyz curved suction catheter was then introduced into the uterus, and suction curettage was then performed until a gritty texture was noted.this was done under ultrasound guidance. All tissue was sent for pathologic evaluation.      Drapes were then removed, and the tenaculum was removed from the cervix. Tenaculum sites were hemostatic.    Patient tolerated the procedure well.  She was transferred to recovery room in stable condition.  Blood loss was estimated at  5  mL, and there were no immediate complications.       Implants: * No implants in log *     Drains: none     Condition: stable       hSira Garcia MD, MD

## 2025-05-22 NOTE — ANESTHESIA PREPROCEDURE EVALUATION
Anesthesia PreOp Note    HPI:     Sandra Gomez is a 34 year old female who presents for preoperative consultation requested by: Shira Garcia MD    Date of Surgery: 2025    Procedure(s):  Dilation and curettage suction with ultrasound guidance  Indication: Missed  (HCC) [O02.1]    Relevant Problems   No relevant active problems       NPO:  Last Liquid Consumption Date: 25  Last Liquid Consumption Time: 2200  Last Solid Consumption Date: 25  Last Solid Consumption Time: 2300  Last Liquid Consumption Date: 25          History Review:  Patient Active Problem List    Diagnosis Date Noted    Abdominal pain of unknown etiology 2024    Missed  (HCC) 2024    Vaginal delivery ([facility]) 2022    Low serum ferritin level 2022    Antepartum anemia ([facility]) 2022    Proteinuria in pregnancy, antepartum ([facility])     Marginal insertion of umbilical cord affecting management of mother ([facility]) 2022    Obesity affecting pregnancy, antepartum ([facility]) 03/10/2022    Hx of macrosomia in infant in prior pregnancy, currently pregnant ([facility]) 03/10/2022    History of ectopic pregnancy 2022    Lesion of ovary     Supervision of normal pregnancy ([facility]) 2021       Past Medical History[1]    Past Surgical History[2]    Prescriptions Prior to Admission[3]  Current Medications and Prescriptions Ordered in Epic[4]    Allergies[5]    Family History[6]  Social Hx on file[7]    Available pre-op labs reviewed.  Lab Results   Component Value Date    WBC 5.9 2025    RBC 4.59 2025    HGB 13.4 2025    HCT 39.3 2025    MCV 85.6 2025    MCH 29.2 2025    MCHC 34.1 2025    RDW 12.5 2025    .0 2025    HCGQN 19,623.8 (H) 2025     Lab Results   Component Value Date     2025    K 3.7 2025     2025    CO2 28.0 2025    BUN 9 2025    CREATSERUM 0.73 2025    GLU 93  2025    CA 9.3 2025          Vital Signs:  Body mass index is 34.99 kg/m².   height is 1.65 m (5' 4.96\") and weight is 95.3 kg (210 lb). Her oral temperature is 98.5 °F (36.9 °C). Her blood pressure is 130/68 and her pulse is 77. Her respiration is 16 and oxygen saturation is 99%.   Vitals:    25 1646 25 1005   BP:  130/68   Pulse:  77   Resp:  16   Temp:  98.5 °F (36.9 °C)   TempSrc:  Oral   SpO2:  99%   Weight: 95.3 kg (210 lb) 95.3 kg (210 lb)   Height: 1.65 m (5' 4.96\")         Anesthesia Evaluation     Patient summary reviewed and Nursing notes reviewed    No history of anesthetic complications   Airway   Mallampati: III  TM distance: <3 FB  Neck ROM: full  Dental - Dentition appears grossly intact     Pulmonary - negative ROS and normal exam   Cardiovascular - negative ROS and normal exam  Exercise tolerance: good    Neuro/Psych - negative ROS     GI/Hepatic/Renal - negative ROS     Endo/Other - negative ROS     Comments: Pregnancy loss at 6w2d  Abdominal                  Anesthesia Plan:   ASA:  2  Plan:   General  Airway:  LMA  Post-op Pain Management: Oral pain medication and IV analgesics  Informed Consent Plan and Risks Discussed With:  Patient, spouse and   Discussed plan with:  CRNA      I have informed Sandra Gomez and/or legal guardian or family member of the nature of the anesthetic plan, benefits, risks including possible dental damage if relevant, major complications, and any alternative forms of anesthetic management.   All of the patient's questions were answered to the best of my ability. The patient desires the anesthetic management as planned.  Yue Lieberman MD  2025 10:17 AM  Present on Admission:   Missed  (HCC)           [1]   Past Medical History:   Antepartum anemia (HCC)    Diet controlled gestational diabetes mellitus (GDM) in third trimester (HCC)   [2]   Past Surgical History:  Procedure Laterality Date     Dilation/curettage,diagnostic      Laparoscopic salpingostomy Right 06/01/2021   [3]   No medications prior to admission.   [4]   Current Facility-Administered Medications Ordered in Epic   Medication Dose Route Frequency Provider Last Rate Last Admin    lactated ringers infusion   Intravenous Continuous Shira Garcia MD        acetaminophen (Tylenol Extra Strength) tab 1,000 mg  1,000 mg Oral Once Shira Garcia MD        famotidine (Pepcid) tab 20 mg  20 mg Oral Once Shira Garcia MD        Or    famotidine (Pepcid) 20 mg/2mL injection 20 mg  20 mg Intravenous Once Shira Garcia MD        metoclopramide (Reglan) tab 10 mg  10 mg Oral Once Shira Garcia MD        Or    metoclopramide (Reglan) 5 mg/mL injection 10 mg  10 mg Intravenous Once Shira Garcia MD         No current [facility]-ordered outpatient medications on file.   [5]   Allergies  Allergen Reactions    Diclofenac ANGIOEDEMA    Penicillins SHORTNESS OF BREATH and ANGIOEDEMA     Denies skin peeling, blisters, or organ damage.    [6]   Family History  Problem Relation Age of Onset    No Known Problems Father     No Known Problems Mother    [7]   Social History  Socioeconomic History    Marital status: Single   Tobacco Use    Smoking status: Never    Smokeless tobacco: Never   Vaping Use    Vaping status: Never Used   Substance and Sexual Activity    Alcohol use: Never    Drug use: Never

## 2025-05-22 NOTE — H&P
[facility]  part of [facility]        HISTORY AND PHYSICAL        Subjective   Chief Complaint:  missed       History of Present Illness:    Sandra Gomez is a  34 year old y/o  who presents for scheduled gyn procedure  suction D&C.  The patients complaints include missed ab.          Past Medical History[1]    Past Surgical History[2]    OB History    Para Term  AB Living   5 2 2 0 1 2   SAB IAB Ectopic Multiple Live Births   0 0 1 0 1       Allergies[3]    Medications - Current[4]      Family History[5]      REVIEW OF SYSTEMS:   CONSTITUTIONAL: Negative for fever, chills, diaphoresis, weakness, fatigue, weight loss, weight gain.  ALLERGIES: Negative for urticaria, hay fever, angioedema  EYES: Negative for blurry vision, decreased vision, loss of vision, eye pain, diplopia, photophobia, discharge  ENT: Negative for sore throat, nasal congestion, nasal discharge, epistaxis, tinnitus, hearing loss  CARDIOVASCULAR: Negative for chest pain, dyspnea on exertion, orthopnea, paroxysmal nocturnal dyspnea, edema, palpitations  RESPIRATORY: Negative for cough, hemoptysis, shortness of breath, pleuritic chest pain, wheezing  BREAST:  Denies breast mass, breast pain, nipple discharge or nipple pain.  ENDOCRINE: Negative for polydipsia/polyuria, palpitations, skin changes, temperature intolerance, unexpected weight changes  HEME-LYMPH: Negative for swollen lymph nodes, bleeding, bruising  GI: Negative abdominal pain, flank pain, nausea, vomiting, diarrhea, constipation, black stool, blood in stool  : Negative for dysuria, frequency/urgency, hematuria, genital discharge, vaginal bleeding, irregular menses, heavy menses, pelvic pain  NEURO: Negative for dizzy/vertigo, headache, focal weakness, numbness/tingling, speech problems, loss of consciousness, confusion, memory loss  MUSCULOSKELETAL: Negative for back pain, joint pain, joint stiffness, joint swelling, muscle  pain, muscle weakness  SKIN: Negative for rash, itching, hives  PSYCH: Negative for anxiety, depression, physical abuse, sexual abuse      PHYSICAL EXAM:    Ht 5' 4.96\" (1.65 m)   Wt 210 lb (95.3 kg)   LMP 02/22/2025   Breastfeeding No   BMI 34.99 kg/m²        General   Mental Status - Alert. General Appearance - Cooperative. Orientation - Oriented X4. Build & Nutrition - Well nourished.    Head and Neck  Thyroid   Gland Characteristics - normal size and consistency.    Chest and Lung Exam   Inspection:   Chest Wall: - Normal.  Percussion:   Quality and Intensity: - Percussion normal.  Palpation: - Palpation normal.  Auscultation:   Breath sounds: - Normal.  Adventitious sounds: - No Adventitious sounds.      Cardiovascular   Auscultation: Rhythm - Regular. Heart Sounds - Normal heart sounds.  Murmurs & Other Heart Sounds: Auscultation of the heart reveals - No Murmurs.      Abdomen   Inspection: Inspection of the abdomen reveals - No Hernias. Incisional scars - No incisional scars.  Palpation/Percussion: Palpation and Percussion of the abdomen reveal - Non Tender and No Palpable abdominal masses.  Liver: - Normal.  Auscultation: Auscultation of the abdomen reveals - Bowel sounds normal.      Female Genitourinary     External Genitalia   Perineum - Normal. Bartholin's Gland - Bilateral - Normal. Clitoris - Normal.  Introitus: Characteristics - No Cystocele, Enterocele or Rectocele. Discharge - None.  Labia Majora: Lesions - Bilateral - None. Characteristics - Bilateral - Normal.  Labia Minora: Lesions - Bilateral - None. Characteristics - Bilateral - Normal.  Urethra: Characteristics - Normal. Discharge - None.  [place] Gland - Bilateral - Normal.  Vulva: Characteristics - Normal. Lesions - None.    Speculum & Bimanual   Vagina:   Vaginal Wall: - Normal.  Vaginal Lesions - None. Vaginal Mucosa - Normal.  Cervix: Characteristics - No Motion tenderness. Discharge - None.  Uterus: Characteristics - Normal. Position -  Midposition.  Adnexa: Characteristics - Bilateral - Normal. Masses - No Adnexal Masses.      Peripheral Vascular   Upper Extremity:   Palpation: - Pulses bilaterally normal.  Lower Extremity: Inspection - Bilateral - Inspection Normal.  Palpation: Edema - Bilateral - No edema.      Neurologic   Mental Status: - Normal.      Lymphatic  General Lymphatics   Description - Normal .      Lab Results   Component Value Date    WBC 5.9 2025    HGB 13.4 2025    HCT 39.3 2025    .0 2025    MCV 85.6 2025    RDW 12.5 2025     No components found for: \"ABOGROUP\", \"RHTYPE\", \"RUBIGG\"        Narrative   PROCEDURE:  PREG 1ST TRIM W/EV (CPT=76801/38613)     COMPARISON: [facility],  PREG 1ST TRIM W/EV (CPT=76801/93165), 2025, 3:12 PM.     INDICATIONS: Pregnancy Issue; Bleeding.  Threatened      TECHNIQUE: Transabdominal and endovaginal imaging for obstetrical and fetal evaluation was performed.     FINDINGS:  GESTATIONAL SAC:   Slightly irregular gestational sac with small perigestational hemorrhage.  FETAL POLE/EMBRYO:   Fetal pole previously measured 3.3 mm and currently measures 2.6 mm.  YOLK SAC:   Previously seen yolk sac has involuted.  CARDIAC ACTIVITY:   No fetal cardiac activity     UTERUS:   Normal.  No masses.  The uterus measures 9.7 x 5.6 x 6.4 cm.     CRL:   2.6 mm  ULTRASOUND AGE :   6 weeks 2 days     OVARIES AND ADNEXA:   No masses    RIGHT OVARY:   Measures 3.6 x 2.6 x 2 cm.  LEFT OVARY:   Obscured by bowel gas.  CUL-DE-SAC:   Normal.  No free fluid.       OTHER:   Negative.                 Impression   CONCLUSION:  1. Early pregnancy failure with crown-rump length corresponding to 6 weeks 2 days.       Assessment and Plan:      Active Problems:    Missed  (HCC)        The patient was counseled regarding surgery and the procedure (suction D&C) was reviewed at length.   Risks of procedure including bleeding/need for blood transfusion  (<1%), infection (5-10%), damage to other organs/bowel/bladder/ureters (<1%),  and anesthesia were reviewed.  Benefits, alternatives, & indications were also discussed.  All questions were answered.  Written information was provided.      Shira Garcia MD         [1]   Past Medical History:   Antepartum anemia (HCC)    Diet controlled gestational diabetes mellitus (GDM) in third trimester (HCC)   [2]   Past Surgical History:  Procedure Laterality Date    Dilation/curettage,diagnostic      Laparoscopic salpingostomy Right 06/01/2021   [3]   Allergies  Allergen Reactions    Diclofenac ANGIOEDEMA    Penicillins SHORTNESS OF BREATH and ANGIOEDEMA     Denies skin peeling, blisters, or organ damage.    [4] No current outpatient medications on file.  [5]   Family History  Problem Relation Age of Onset    No Known Problems Father     No Known Problems Mother

## 2025-05-22 NOTE — ANESTHESIA POSTPROCEDURE EVALUATION
Patient: Sandra Gomez    Procedure Summary       Date: 25 Room / Location: [facility] MAIN OR  MAIN OR    Anesthesia Start: 1053 Anesthesia Stop: 1141    Procedure: Dilation and curettage suction with ultrasound guidance (Vagina ) Diagnosis:       Missed  (HCC)      (Missed  (HCC) [O02.1])    Surgeons: Shira Garcia MD Anesthesiologist: Yue Lieberman MD    Anesthesia Type: general ASA Status: 2            Anesthesia Type: general    Vitals Value Taken Time   /56 25 11:39   Temp 97 25 11:42   Pulse 89 25 11:41   Resp 19 25 11:41   SpO2 92 % 25 11:41   Vitals shown include unfiled device data.    [facility] AN Post Evaluation:   Patient Evaluated in PACU  Patient Participation: complete - patient participated  Level of Consciousness: awake  Pain Score: 0  Pain Management: adequate  Airway Patency:patent  Dental exam unchanged from preop  Yes    Nausea/Vomiting: none  Cardiovascular Status: stable  Respiratory Status: room air and spontaneous ventilation  Postoperative Hydration stable      Jessica Atendreese, CHET  2025 11:42 AM

## 2025-05-22 NOTE — ANESTHESIA PROCEDURE NOTES
Airway  Date/Time: 5/22/2025 11:06 AM  Reason: elective    Difficult airway    General Information and Staff   Patient location during procedure: OR  Anesthesiologist: Yue Lieberman MD  Resident/CRNA: Jessica Wylie CRNA  Performed: CRNA   Performed by: Jessica Wylie CRNA  Authorized by: Yue Lieberman MD        Indications and Patient Condition  Indications for airway management: anesthesia  Sedation level: minimal      Preoxygenated: yesPatient position: sniffing  MILS maintained throughout    Mask difficulty assessment: 1 - vent by mask  Planned trial extubation    Final Airway Details    Final airway type: endotracheal airway    Successful airway: ETT     Successful intubation technique: Video laryngoscopy  Endotracheal tube insertion site: oral  Blade: GlideScope  ETT size (mm): 7.0    Cormack-Lehane Classification: grade III - view of epiglottis only  Measured from: lips  ETT to lips (cm): 23  Number of attempts at approach: 1  Ventilation between attempts: BVM  Number of other approaches attempted: 2    Other Attempts  Unsuccessful attempted endotracheal techniques: direct laryngoscopy    Additional Comments  Positive redundant tissues,+anterior airway, + Large neck +Obesity

## 2025-06-05 ENCOUNTER — OFFICE VISIT (OUTPATIENT)
Dept: OBGYN CLINIC | Facility: CLINIC | Age: 35
End: 2025-06-05

## 2025-06-05 VITALS — SYSTOLIC BLOOD PRESSURE: 111 MMHG | DIASTOLIC BLOOD PRESSURE: 66 MMHG | BODY MASS INDEX: 35 KG/M2 | WEIGHT: 210 LBS

## 2025-06-05 DIAGNOSIS — N76.0 BV (BACTERIAL VAGINOSIS): ICD-10-CM

## 2025-06-05 DIAGNOSIS — N96 RECURRENT PREGNANCY LOSS: Primary | ICD-10-CM

## 2025-06-05 DIAGNOSIS — B96.89 BV (BACTERIAL VAGINOSIS): ICD-10-CM

## 2025-06-05 DIAGNOSIS — O26.20 RECURRENT PREGNANCY LOSS, ANTEPARTUM CONDITION OR COMPLICATION (HCC): ICD-10-CM

## 2025-06-05 LAB
CELLS ANALYZED CHRPOC: 20
CELLS COUNTED CHRPOC: 20
CELLS KARYOTYPED CHRPOC: 2
GTG BAND RES ACHIEVED CHRPOC: 400

## 2025-06-05 RX ORDER — METRONIDAZOLE 500 MG/1
500 TABLET ORAL 2 TIMES DAILY
Qty: 14 TABLET | Refills: 0 | Status: SHIPPED | OUTPATIENT
Start: 2025-06-05 | End: 2025-06-12

## 2025-06-09 NOTE — PROGRESS NOTES
[facility]  Obstetrics and Gynecology  Focused Gynecology Problem Exam      Sandra Gomez is a 34 year old female presenting for Follow - Up (D&c f/u /)  .    HPI:     Chief Complaint   Patient presents with    Follow - Up     D&c f/u        S/p suction D&C on 25 for early pregnancy loss  This is her second consecutive pregnancy loss  Has suction D&C last aug 2024    She desires pregnancy    Had previous has uncomplicated pregnancies and deliveries   Same partner    No other changes in health history or medications    Menarche: 14 (2025 12:14 PM)  Period Cycle (Days): no cycles since last pregnancy (2025 12:14 PM)  Use of Birth Control (if yes, specify type): None (2025 12:14 PM)  Hx Prior Abnormal Pap: No (2025 12:14 PM)  Pap Date: 22 (2025 12:14 PM)  Pap Result Notes: negative (2025 12:14 PM)      Medications (Active prior to today's visit):  Current Medications[1]  Allergies:  Allergies[2]  HISTORY:     OB History    Para Term  AB Living   5 2 2 0 1 2   SAB IAB Ectopic Multiple Live Births     1 0 1      # Outcome Date GA Lbr Juventino/2nd Weight Sex Type Anes PTL Lv   5 Current            4 Term 22 38w5d 01:57 / 00:07 8 lb 12 oz (3.97 kg) M NORMAL SPONT None N ZOË      Complications: Precipitous labor   3 Ectopic 20 5w0d          2 Term 13 40w0d  10 lb 4 oz (4.649 kg) F Vag-Spont      1                   Past Medical History[3]    Past Surgical History[4]    Family History[5]    Social History     Socioeconomic History    Marital status: Single     Spouse name: Not on file    Number of children: Not on file    Years of education: Not on file    Highest education level: Not on file   Occupational History    Not on file   Tobacco Use    Smoking status: Never    Smokeless tobacco: Never   Vaping Use    Vaping status: Never Used   Substance and Sexual Activity    Alcohol use: Never    Drug use: Never    Sexual activity: Not on file   Other  Topics Concern    Not on file   Social History Narrative    Not on file     Social Drivers of Health     Food Insecurity: No Food Insecurity (8/27/2024)    Food Insecurity     Food Insecurity: Never true   Transportation Needs: No Transportation Needs (8/27/2024)    Transportation Needs     Lack of Transportation: No     Car Seat: Not on file   Stress: Not on file   Housing Stability: Low Risk  (8/27/2024)    Housing Stability     Housing Instability: No     Housing Instability Emergency: Not on file     Crib or Bassinette: Not on file       ROS:   Review of Systems:    Constitutional:    denies fever / chills  Eyes:     denies blurred or double vision  Cardiovascular:  denies chest pain or palpitations  Respiratory:    denies shortness of breath  Gastrointestinal:  denies severe abdominal pain, frequent diarrhea or constipation, nausea / vomiting  Genitourinary:    denies dysuria, bothersome incontinence  Skin/Breast:   denies any breast pain, lumps, or discharge  Neurological:    denies frequent severe headaches  Psychiatric:   denies depression or anxiety, thoughts of harming self or others  Heme/Lymph:    denies easy bruising or bleeding  PHYSICAL EXAM:   /66   Wt 210 lb (95.3 kg)   LMP 02/22/2025   BMI 34.99 kg/m²     GENERAL: well developed, well nourished, in no apparent distress  ABDOMEN: Soft, non distended; non tender, no masses  GYNE/: External Genitalia: Normal appearing, no lesions. Urethral meatus appear wnl, no abnormal discharge or lesions noted.          Bladder: well supported, urethra wnl, no lesions or fissures                     Vagina: normal pink mucosa, no lesions, +discharge.                      Uterus: anteverted, mobile, non tender, normal size                     Cervix: Normal                      Adnexa: non tender, no masses, normal size    Pathology  Final Diagnosis:      Products of conception:   Fragments of decidualized tissue, hypersecretory endometrium and chorionic  villi, compatible with products of conception.    Normal chromosomes     ASSESSMENT:    Reviewed path c/w normal karyotype and POC  Given recurrent pregnancy loss, will start work up and get labs  Recommend MFM and REJI consult      ICD-10-CM    1. Recurrent pregnancy loss  N96 Anticardiolipin AB, IgG/M, QN     Chromosome Analysis Peripheral Blood     Lupus Anticoagulant Comp     TSH and Free T4     Hemoglobin A1C     Maternal Fetal Medicine Referral - [place] Location      2. Recurrent pregnancy loss, antepartum condition or complication (HCC)  O26.20       3. BV (bacterial vaginosis)  N76.0 metroNIDAZOLE 500 MG Oral Tab    B96.89           PLAN:   Treated for bv today  Referral given  Rtc for annual or prn     ORDERS:     Orders Placed This Encounter   Procedures    Anticardiolipin AB, IgG/M, QN    Chromosome Analysis Peripheral Blood    Lupus Anticoagulant Comp    TSH and Free T4    Hemoglobin A1C     PRESCRIPTIONS:     Requested Prescriptions     Signed Prescriptions Disp Refills    metroNIDAZOLE 500 MG Oral Tab 14 tablet 0     Sig: Take 1 tablet (500 mg total) by mouth 2 (two) times daily for 7 days. No alcohol or intercourse during treatment and for 24 hr after last dose.     IMAGING/ REFERRALS:    MATERNAL FETAL MEDICINE - INTERNAL     Total time spent 30 minutes this calendar day which includes preparing to see the patient including chart review, obtaining and/or reviewing additional medical history, performing a physical exam and evaluation, documenting clinical information in the electronic medical record, independently interpreting results, counseling the patient, communicating results to the patient/family/caregiver and coordinating care.       Shira Garcia MD                 [1]   Current Outpatient Medications   Medication Sig Dispense Refill    metroNIDAZOLE 500 MG Oral Tab Take 1 tablet (500 mg total) by mouth 2 (two) times daily for 7 days. No alcohol or intercourse during treatment and for 24  hr after last dose. 14 tablet 0    acetaminophen 500 MG Oral Tab Take 2 tablets (1,000 mg total) by mouth every 6 (six) hours as needed for Pain. 120 tablet 0    ibuprofen 600 MG Oral Tab Take 1 tablet (600 mg total) by mouth every 6 (six) hours as needed for Pain. 120 tablet 0    senna-docusate 8.6-50 MG Oral Tab Take 1 tablet by mouth daily. 90 tablet 0   [2]   Allergies  Allergen Reactions    Diclofenac ANGIOEDEMA    Penicillins SHORTNESS OF BREATH and ANGIOEDEMA     Denies skin peeling, blisters, or organ damage.    [3]   Past Medical History:   Antepartum anemia (HCC)    Diet controlled gestational diabetes mellitus (GDM) in third trimester (HCC)   [4]   Past Surgical History:  Procedure Laterality Date    Dilation/curettage,diagnostic      Laparoscopic salpingostomy Right 06/01/2021   [5]   Family History  Problem Relation Age of Onset    No Known Problems Father     No Known Problems Mother

## (undated) DIAGNOSIS — R73.09 ELEVATED GLUCOSE: Primary | ICD-10-CM

## (undated) DEVICE — SYSTEM COLL CANSTR LID SEAL CAP W/O TISS TRAP

## (undated) DEVICE — TISSUE RETRIEVAL SYSTEM: Brand: INZII RETRIEVAL SYSTEM

## (undated) DEVICE — CURETTE VAC ASPIR 8MM RIG CRV DISP

## (undated) DEVICE — GLOVE SUR 6 SENSICARE PI MIC PIP CRM PWD F

## (undated) DEVICE — SUTURE VICRYL 0 UR-6

## (undated) DEVICE — TROCAR: Brand: KII FIOS FIRST ENTRY

## (undated) DEVICE — GLOVE SUR 6 SENSICARE PI PIP GRN PWD F

## (undated) DEVICE — SET COLL TBNG 3/8INX6FT W/ INTEGR SWVL

## (undated) DEVICE — TRAY SKIN PREP PVP-1

## (undated) DEVICE — [HIGH FLOW INSUFFLATOR,  DO NOT USE IF PACKAGE IS DAMAGED,  KEEP DRY,  KEEP AWAY FROM SUNLIGHT,  PROTECT FROM HEAT AND RADIOACTIVE SOURCES.]: Brand: PNEUMOSURE

## (undated) DEVICE — STRAP OR POS W3.5XL19IN FOAM STRRP W/ SLIP

## (undated) DEVICE — SYSTEM COLL W/ TISS TRAP INCLUDE COLL CANSTR

## (undated) DEVICE — DRAPE,LITHOTOMY,STERILE: Brand: MEDLINE

## (undated) DEVICE — PACK CUSTOM D AND C

## (undated) DEVICE — 6 ML SYRINGE LUER-LOCK TIP: Brand: MONOJECT

## (undated) DEVICE — SOLUTION IRRIG 1000ML 0.9% NACL USP BTL

## (undated) DEVICE — TROCAR: Brand: KII® SLEEVE

## (undated) DEVICE — CURETTE SURG 10MM RIG VAC ST CRV DISP

## (undated) DEVICE — LAPAROSCOPY: Brand: MEDLINE INDUSTRIES, INC.

## (undated) DEVICE — LIGASURE LAP MARYLAND 37CM

## (undated) DEVICE — DEVICE PORT SITE CLOSURE

## (undated) DEVICE — GOWN SURG AERO BLUE PERF LG

## (undated) DEVICE — MANIPULATOR CATH ESCP KRNR

## (undated) DEVICE — PACK CUSTOM D

## (undated) DEVICE — CHLORAPREP 26ML APPLICATOR

## (undated) DEVICE — SOL  .9 1000ML BTL

## (undated) DEVICE — DRAPE SHEET LAVH 124X112X30

## (undated) DEVICE — ENCORE® LATEX MICRO SIZE 7.5, STERILE LATEX POWDER-FREE SURGICAL GLOVE: Brand: ENCORE

## (undated) DEVICE — HANDLE SUR BLU PLAS LT FLX SLIP ON ST DISP

## (undated) DEVICE — DRAPE,UNDRBUT,WHT GRAD PCH,CAPPORT,20/CS: Brand: MEDLINE

## (undated) DEVICE — STERILE SURGICAL LUBRICANT, METAL TUBE: Brand: SURGILUBE

## (undated) DEVICE — NEEDLE ANES 22GA L3.5IN SPNL SS QNCKE STYL

## (undated) DEVICE — UNDYED BRAIDED (POLYGLACTIN 910), SYNTHETIC ABSORBABLE SUTURE: Brand: COATED VICRYL

## (undated) DEVICE — SUTURE VICRYL 0 J906G

## (undated) DEVICE — SOL  .9 3000ML

## (undated) DEVICE — ENCORE® LATEX ACCLAIM SIZE 7, STERILE LATEX POWDER-FREE SURGICAL GLOVE: Brand: ENCORE

## (undated) DEVICE — SUCTION CANISTER, 3000CC,SAFELINER: Brand: DEROYAL

## (undated) NOTE — LETTER
25          Sandra Gomez  :  1990      To Whom It May Concern:      This patient was seen at [facility] and underwent a procedure requiring anesthesia. Patient's , Andrea Martinez, provided transportation for patient. Please excuse him from attending work on 25.      If you have any questions or concerns, feel free to contact our office.       Sincerely,  [facility]

## (undated) NOTE — LETTER
Date & Time: 1/31/2022, 10:35 PM  Patient: Johnny Sharpe BrendanGomez  Encounter Provider(s):    Gina Motley MD       To Whom It May Concern:    Gerber Mishra was seen and treated in our department on 1/31/2022. She should not return to work until 02/02/2022.     If you have any questions or concerns, please do not hesitate to call.        _____________________________  Physician/APC Signature

## (undated) NOTE — LETTER
Date & Time: 8/28/2024, 12:55 PM  Patient: Sandra Gomez  Encounter Provider(s):    Fuentes oByd MD Ocampo, Jessica M, MD       To Whom It May Concern:    Sandra Gomez was seen and treated in our department on 8/27/2024-8/28/2024. She {Return to school/sport/work:[id number]}.    If you have any questions or concerns, please do not hesitate to call.        _____________________________  Physician/APC Signature

## (undated) NOTE — LETTER
Date & Time: 7/22/2024, 8:23 PM  Patient: Sandra Gomez  Encounter Provider(s):    Kelsey Amador MD       To Whom It May Concern:    Sandra Gomez was seen and treated in our department on 7/22/2024. She should be excused for this day and can return to work tomorrow, 7/23/2024.    If you have any questions or concerns, please do not hesitate to call.        _____________________________  Physician/APC Signature

## (undated) NOTE — LETTER
Date & Time: 3/30/2022, 5:30 PM  Patient: Sony Scanlon  Encounter Provider(s): Yulia Veliz MD       To Whom It May Concern:    Sony Scanlon was seen and treated in our department on 3/30/2022. She can return to work Friday 4/1/22 . If you have any questions or concerns, please do not hesitate to call.         ___Dr. Parmar__________________  Physician/APC Signature

## (undated) NOTE — LETTER
12/24/20        Sandra Erazo  [facility] [address]  [address]  [place] [id number]      Estimado Sandra Erazo,    Nuestros registros indican que tiene análisis clínicos pendientes y/o pruebas que se ordenaron en pineda nombre que no se shine completado.     H

## (undated) NOTE — LETTER
9/11/2024          To Whom It May Concern:    Sandra Gomez is currently under my medical care and may not return to work at this time.    She may return to work on 09/12/2024.  Activity is restricted as follows: none.    If you require additional information please contact our office.        Sincerely,    Shira Garcia MD, MD